# Patient Record
Sex: FEMALE | Race: WHITE | Employment: OTHER | URBAN - METROPOLITAN AREA
[De-identification: names, ages, dates, MRNs, and addresses within clinical notes are randomized per-mention and may not be internally consistent; named-entity substitution may affect disease eponyms.]

---

## 2017-03-07 PROBLEM — M81.0 OSTEOPOROSIS: Status: ACTIVE | Noted: 2017-03-07

## 2018-05-17 ENCOUNTER — HOSPITAL ENCOUNTER (OUTPATIENT)
Age: 68
Discharge: HOME | End: 2018-05-17
Payer: COMMERCIAL

## 2018-05-17 LAB
ALT SERPL-CCNC: 35 U/L (ref 0–32)
AST SERPL-CCNC: 30 U/L (ref 0–31)
CREAT SERPL-MCNC: 0.6 MG/DL (ref 0.5–1)
GFR AFRICAN AMERICAN: >60
GFR NON-AFRICAN AMERICAN: >60 ML/MIN/1.73
HCT VFR BLD CALC: 41 % (ref 34–48)
HEMOGLOBIN: 13.8 G/DL (ref 11.5–15.5)
MCH RBC QN AUTO: 33.8 PG (ref 26–35)
MCHC RBC AUTO-ENTMCNC: 33.7 % (ref 32–34.5)
MCV RBC AUTO: 100.5 FL (ref 80–99.9)
PDW BLD-RTO: 12.6 FL (ref 11.5–15)
PLATELET # BLD: 334 E9/L (ref 130–450)
PMV BLD AUTO: 9.6 FL (ref 7–12)
RBC # BLD: 4.08 E12/L (ref 3.5–5.5)
TOTAL CK: 305 U/L (ref 20–180)
WBC # BLD: 7.6 E9/L (ref 4.5–11.5)

## 2018-05-17 PROCEDURE — 85027 COMPLETE CBC AUTOMATED: CPT

## 2018-05-17 PROCEDURE — 36415 COLL VENOUS BLD VENIPUNCTURE: CPT

## 2018-05-17 PROCEDURE — 84460 ALANINE AMINO (ALT) (SGPT): CPT

## 2018-05-17 PROCEDURE — 82550 ASSAY OF CK (CPK): CPT

## 2018-05-17 PROCEDURE — 84450 TRANSFERASE (AST) (SGOT): CPT

## 2018-05-17 PROCEDURE — 82565 ASSAY OF CREATININE: CPT

## 2018-05-22 ENCOUNTER — HOSPITAL ENCOUNTER (OUTPATIENT)
Age: 68
Discharge: HOME | End: 2018-05-22
Payer: COMMERCIAL

## 2018-05-22 LAB
ALBUMIN SERPL-MCNC: 4.5 G/DL (ref 3.5–5.2)
ALP BLD-CCNC: 56 U/L (ref 35–104)
ALT SERPL-CCNC: 25 U/L (ref 0–32)
ANION GAP SERPL CALCULATED.3IONS-SCNC: 15 MMOL/L (ref 7–16)
AST SERPL-CCNC: 24 U/L (ref 0–31)
BILIRUB SERPL-MCNC: 0.6 MG/DL (ref 0–1.2)
BUN BLDV-MCNC: 16 MG/DL (ref 8–23)
CALCIUM SERPL-MCNC: 8.9 MG/DL (ref 8.6–10.2)
CHLORIDE BLD-SCNC: 99 MMOL/L (ref 98–107)
CHOLESTEROL, FASTING: 177 MG/DL (ref 0–199)
CO2: 29 MMOL/L (ref 22–29)
CREAT SERPL-MCNC: 0.5 MG/DL (ref 0.5–1)
GFR AFRICAN AMERICAN: >60
GFR NON-AFRICAN AMERICAN: >60 ML/MIN/1.73
GLUCOSE FASTING: 120 MG/DL (ref 74–109)
HBA1C MFR BLD: 8.1 % (ref 4.8–5.9)
HDLC SERPL-MCNC: 63 MG/DL
LDL CHOLESTEROL CALCULATED: 72 MG/DL (ref 0–99)
POTASSIUM SERPL-SCNC: 3.4 MMOL/L (ref 3.5–5)
SODIUM BLD-SCNC: 143 MMOL/L (ref 132–146)
TOTAL PROTEIN: 6.9 G/DL (ref 6.4–8.3)
TRIGLYCERIDE, FASTING: 209 MG/DL (ref 0–149)
VLDLC SERPL CALC-MCNC: 42 MG/DL

## 2018-05-22 PROCEDURE — 80053 COMPREHEN METABOLIC PANEL: CPT

## 2018-05-22 PROCEDURE — 83036 HEMOGLOBIN GLYCOSYLATED A1C: CPT

## 2018-05-22 PROCEDURE — 80061 LIPID PANEL: CPT

## 2018-05-22 PROCEDURE — 36415 COLL VENOUS BLD VENIPUNCTURE: CPT

## 2018-07-03 ENCOUNTER — NURSE TRIAGE (OUTPATIENT)
Dept: OTHER | Facility: CLINIC | Age: 68
End: 2018-07-03

## 2018-07-03 ENCOUNTER — APPOINTMENT (OUTPATIENT)
Dept: CT IMAGING | Age: 68
End: 2018-07-03
Payer: COMMERCIAL

## 2018-07-03 ENCOUNTER — HOSPITAL ENCOUNTER (EMERGENCY)
Age: 68
Discharge: HOME | End: 2018-07-03
Attending: EMERGENCY MEDICINE
Payer: COMMERCIAL

## 2018-07-03 VITALS
HEART RATE: 79 BPM | OXYGEN SATURATION: 97 % | HEIGHT: 68 IN | RESPIRATION RATE: 14 BRPM | BODY MASS INDEX: 24.86 KG/M2 | DIASTOLIC BLOOD PRESSURE: 69 MMHG | WEIGHT: 164 LBS | SYSTOLIC BLOOD PRESSURE: 168 MMHG | TEMPERATURE: 97.2 F

## 2018-07-03 DIAGNOSIS — R10.31 RIGHT LOWER QUADRANT ABDOMINAL PAIN: Primary | ICD-10-CM

## 2018-07-03 DIAGNOSIS — K44.9 HIATAL HERNIA: ICD-10-CM

## 2018-07-03 DIAGNOSIS — R19.7 DIARRHEA, UNSPECIFIED TYPE: ICD-10-CM

## 2018-07-03 LAB
ALBUMIN SERPL-MCNC: 4.7 G/DL (ref 3.5–5.2)
ALP BLD-CCNC: 71 U/L (ref 35–104)
ALT SERPL-CCNC: 28 U/L (ref 0–32)
ANION GAP SERPL CALCULATED.3IONS-SCNC: 16 MMOL/L (ref 7–16)
AST SERPL-CCNC: 28 U/L (ref 0–31)
BACTERIA: ABNORMAL /HPF
BASOPHILS ABSOLUTE: 0.04 E9/L (ref 0–0.2)
BASOPHILS RELATIVE PERCENT: 0.4 % (ref 0–2)
BILIRUB SERPL-MCNC: 0.5 MG/DL (ref 0–1.2)
BILIRUBIN URINE: NEGATIVE
BLOOD, URINE: ABNORMAL
BUN BLDV-MCNC: 15 MG/DL (ref 8–23)
CALCIUM SERPL-MCNC: 8.7 MG/DL (ref 8.6–10.2)
CHLORIDE BLD-SCNC: 100 MMOL/L (ref 98–107)
CLARITY: CLEAR
CO2: 26 MMOL/L (ref 22–29)
COLOR: YELLOW
CREAT SERPL-MCNC: 0.6 MG/DL (ref 0.5–1)
CRYSTALS, UA: ABNORMAL
EOSINOPHILS ABSOLUTE: 0.04 E9/L (ref 0.05–0.5)
EOSINOPHILS RELATIVE PERCENT: 0.4 % (ref 0–6)
GFR AFRICAN AMERICAN: >60
GFR NON-AFRICAN AMERICAN: >60 ML/MIN/1.73
GLUCOSE BLD-MCNC: 221 MG/DL (ref 74–109)
GLUCOSE URINE: NEGATIVE MG/DL
HCT VFR BLD CALC: 42.8 % (ref 34–48)
HEMOGLOBIN: 15.1 G/DL (ref 11.5–15.5)
IMMATURE GRANULOCYTES #: 0.07 E9/L
IMMATURE GRANULOCYTES %: 0.6 % (ref 0–5)
KETONES, URINE: ABNORMAL MG/DL
LACTIC ACID: 1.6 MMOL/L (ref 0.5–2.2)
LACTIC ACID: 2.7 MMOL/L (ref 0.5–2.2)
LACTIC ACID: 3.1 MMOL/L (ref 0.5–2.2)
LEUKOCYTE ESTERASE, URINE: ABNORMAL
LYMPHOCYTES ABSOLUTE: 1.05 E9/L (ref 1.5–4)
LYMPHOCYTES RELATIVE PERCENT: 9.2 % (ref 20–42)
MCH RBC QN AUTO: 34.8 PG (ref 26–35)
MCHC RBC AUTO-ENTMCNC: 35.3 % (ref 32–34.5)
MCV RBC AUTO: 98.6 FL (ref 80–99.9)
MONOCYTES ABSOLUTE: 0.73 E9/L (ref 0.1–0.95)
MONOCYTES RELATIVE PERCENT: 6.4 % (ref 2–12)
NEUTROPHILS ABSOLUTE: 9.43 E9/L (ref 1.8–7.3)
NEUTROPHILS RELATIVE PERCENT: 83 % (ref 43–80)
NITRITE, URINE: NEGATIVE
PDW BLD-RTO: 12.5 FL (ref 11.5–15)
PH UA: 5.5 (ref 5–9)
PLATELET # BLD: 333 E9/L (ref 130–450)
PMV BLD AUTO: 9.4 FL (ref 7–12)
POTASSIUM SERPL-SCNC: 3.7 MMOL/L (ref 3.5–5)
PROTEIN UA: NEGATIVE MG/DL
RBC # BLD: 4.34 E12/L (ref 3.5–5.5)
RBC UA: ABNORMAL /HPF (ref 0–2)
SODIUM BLD-SCNC: 142 MMOL/L (ref 132–146)
SPECIFIC GRAVITY UA: >=1.03 (ref 1–1.03)
TOTAL PROTEIN: 7.5 G/DL (ref 6.4–8.3)
UROBILINOGEN, URINE: 0.2 E.U./DL
WBC # BLD: 11.4 E9/L (ref 4.5–11.5)
WBC UA: ABNORMAL /HPF (ref 0–5)

## 2018-07-03 PROCEDURE — 74177 CT ABD & PELVIS W/CONTRAST: CPT

## 2018-07-03 PROCEDURE — 81001 URINALYSIS AUTO W/SCOPE: CPT

## 2018-07-03 PROCEDURE — 99284 EMERGENCY DEPT VISIT MOD MDM: CPT

## 2018-07-03 PROCEDURE — 36415 COLL VENOUS BLD VENIPUNCTURE: CPT

## 2018-07-03 PROCEDURE — 2580000003 HC RX 258: Performed by: STUDENT IN AN ORGANIZED HEALTH CARE EDUCATION/TRAINING PROGRAM

## 2018-07-03 PROCEDURE — 6360000004 HC RX CONTRAST MEDICATION: Performed by: RADIOLOGY

## 2018-07-03 PROCEDURE — 85025 COMPLETE CBC W/AUTO DIFF WBC: CPT

## 2018-07-03 PROCEDURE — 2580000003 HC RX 258: Performed by: RADIOLOGY

## 2018-07-03 PROCEDURE — 80053 COMPREHEN METABOLIC PANEL: CPT

## 2018-07-03 PROCEDURE — 83605 ASSAY OF LACTIC ACID: CPT

## 2018-07-03 RX ORDER — 0.9 % SODIUM CHLORIDE 0.9 %
1000 INTRAVENOUS SOLUTION INTRAVENOUS ONCE
Status: COMPLETED | OUTPATIENT
Start: 2018-07-03 | End: 2018-07-03

## 2018-07-03 RX ORDER — SODIUM CHLORIDE 0.9 % (FLUSH) 0.9 %
10 SYRINGE (ML) INJECTION ONCE
Status: COMPLETED | OUTPATIENT
Start: 2018-07-03 | End: 2018-07-03

## 2018-07-03 RX ORDER — DICYCLOMINE HYDROCHLORIDE 10 MG/1
20 CAPSULE ORAL 4 TIMES DAILY PRN
Qty: 20 CAPSULE | Refills: 0 | Status: SHIPPED | OUTPATIENT
Start: 2018-07-03 | End: 2021-01-18 | Stop reason: CLARIF

## 2018-07-03 RX ADMIN — SODIUM CHLORIDE 1000 ML: 9 INJECTION, SOLUTION INTRAVENOUS at 17:56

## 2018-07-03 RX ADMIN — Medication 10 ML: at 12:49

## 2018-07-03 RX ADMIN — IOPAMIDOL 110 ML: 755 INJECTION, SOLUTION INTRAVENOUS at 12:49

## 2018-07-03 RX ADMIN — SODIUM CHLORIDE 1000 ML: 9 INJECTION, SOLUTION INTRAVENOUS at 14:36

## 2018-07-03 ASSESSMENT — PAIN DESCRIPTION - PAIN TYPE: TYPE: ACUTE PAIN

## 2018-07-03 ASSESSMENT — ENCOUNTER SYMPTOMS
SORE THROAT: 0
SHORTNESS OF BREATH: 0
COLOR CHANGE: 0
COUGH: 0
BACK PAIN: 0
DIARRHEA: 1
ANAL BLEEDING: 0
NAUSEA: 0
ABDOMINAL PAIN: 1
BLOOD IN STOOL: 0
VOMITING: 0

## 2018-07-03 ASSESSMENT — PAIN DESCRIPTION - DESCRIPTORS: DESCRIPTORS: ACHING

## 2018-07-03 ASSESSMENT — PAIN DESCRIPTION - FREQUENCY: FREQUENCY: CONTINUOUS

## 2018-07-03 ASSESSMENT — PAIN DESCRIPTION - ORIENTATION: ORIENTATION: LOWER

## 2018-07-03 ASSESSMENT — PAIN DESCRIPTION - PROGRESSION: CLINICAL_PROGRESSION: GRADUALLY WORSENING

## 2018-07-03 ASSESSMENT — PAIN SCALES - GENERAL: PAINLEVEL_OUTOF10: 8

## 2018-07-03 ASSESSMENT — PAIN DESCRIPTION - LOCATION: LOCATION: ABDOMEN

## 2018-07-03 NOTE — ED NOTES
Spoke to Luis Fernando in [address]. Advised pt not to take Metphormin for 48 hours due to administration of IV contrast during CT scan. Will continue to monitor.      Rosanna Zhu RN  07/03/18 5773

## 2018-07-03 NOTE — ED PROVIDER NOTES
The patient is a 81-year-old female with a history of celiac disease who presents to the emergency department with complaint of 10 days of right lower quadrant abdominal pain, chronic diarrhea, and dark stools intermittently. The pain is rated 8/10 at its worst, fluctuates in severity, is better when lying flat. The patient currently reports pain is tolerable although it is more painful when elevated. She is concerned that she may have another hernia. Review of Systems   Constitutional: Negative for activity change, appetite change, chills, diaphoresis, fatigue and fever. HENT: Negative for congestion and sore throat. Eyes: Negative for visual disturbance. Respiratory: Negative for cough and shortness of breath. Cardiovascular: Negative for chest pain and palpitations. Gastrointestinal: Positive for abdominal pain (RLQ) and diarrhea (chronic). Negative for anal bleeding, blood in stool, nausea and vomiting. Dark stools intermitently   Endocrine: Negative for polyuria. Genitourinary: Negative for difficulty urinating, dysuria, flank pain and hematuria. Musculoskeletal: Negative for arthralgias, back pain, gait problem, joint swelling, myalgias, neck pain and neck stiffness. Skin: Negative for color change, pallor, rash and wound. Allergic/Immunologic: Negative for immunocompromised state. Neurological: Negative for dizziness, syncope, weakness, light-headedness, numbness and headaches. Hematological: Negative for adenopathy. Does not bruise/bleed easily. Psychiatric/Behavioral: Negative. Physical Exam   Constitutional: She is oriented to person, place, and time. She appears well-developed and well-nourished. No distress. HENT:   Head: Normocephalic and atraumatic. Right Ear: External ear normal.   Left Ear: External ear normal.   Nose: Nose normal.   Mouth/Throat: Oropharynx is clear and moist. No oropharyngeal exudate.    Eyes: Conjunctivae and EOM are normal. Urinalysis   Result Value Ref Range    Color, UA Yellow Straw/Yellow    Clarity, UA Clear Clear    Glucose, Ur Negative Negative mg/dL    Bilirubin Urine Negative Negative    Ketones, Urine TRACE (A) Negative mg/dL    Specific Gravity, UA >=1.030 1.005 - 1.030    Blood, Urine SMALL (A) Negative    pH, UA 5.5 5.0 - 9.0    Protein, UA Negative Negative mg/dL    Urobilinogen, Urine 0.2 <2.0 E.U./dL    Nitrite, Urine Negative Negative    Leukocyte Esterase, Urine TRACE (A) Negative   Microscopic Urinalysis   Result Value Ref Range    WBC, UA 0-1 0 - 5 /HPF    RBC, UA 1-3 0 - 2 /HPF    Bacteria, UA RARE (A) /HPF    Crystals Rare    Lactic Acid, Plasma   Result Value Ref Range    Lactic Acid 3.1 (H) 0.5 - 2.2 mmol/L   Lactic Acid, Plasma   Result Value Ref Range    Lactic Acid 1.6 0.5 - 2.2 mmol/L       Radiology:  CT ABDOMEN PELVIS W IV CONTRAST Additional Contrast? None   Final Result   There are multiple diverticula seen    Findings compatible with fatty infiltration of the liver   Renal masses, statistically likely cysts. Hiatal hernia                                  ------------------------- NURSING NOTES AND VITALS REVIEWED ---------------------------  Date / Time Roomed:  7/3/2018 11:19 AM  ED Bed Assignment:  08/35    The nursing notes within the ED encounter and vital signs as below have been reviewed. BP (!) 168/69   Pulse 79   Temp 97.2 °F (36.2 °C) (Temporal)   Resp 14   Ht 5' 8\" (1.727 m)   Wt 164 lb (74.4 kg)   SpO2 97%   Breastfeeding? No   BMI 24.94 kg/m²   Oxygen Saturation Interpretation: Normal      ------------------------------------------ PROGRESS NOTES ------------------------------------------  8:34 PM  I have spoken with the patient and discussed todays results, in addition to providing specific details for the plan of care and counseling regarding the diagnosis and prognosis. Their questions are answered at this time and they are agreeable with the plan.  I discussed at length with them reasons for immediate return here for re evaluation. They will followup with their GI specialist and primary care physician by calling their office within the next week.      --------------------------------- ADDITIONAL PROVIDER NOTES ---------------------------------  At this time the patient is without objective evidence of an acute process requiring hospitalization or inpatient management. They have remained hemodynamically stable throughout their entire ED visit and are stable for discharge with outpatient follow-up. The plan has been discussed in detail and they are aware of the specific conditions for emergent return, as well as the importance of follow-up. New Prescriptions    DICYCLOMINE (BENTYL) 10 MG CAPSULE    Take 2 capsules by mouth 4 times daily as needed (abdominal cramping)       Diagnosis:  1. Right lower quadrant abdominal pain    2. Diarrhea, unspecified type    3. Hiatal hernia        Disposition:  Patient's disposition: Discharge to home  Patient's condition is stable.          Robi Huang,   Resident  07/03/18 [id number]

## 2018-08-02 ENCOUNTER — HOSPITAL ENCOUNTER (OUTPATIENT)
Age: 68
Discharge: HOME | End: 2018-08-02
Payer: COMMERCIAL

## 2018-08-02 LAB
ALT SERPL-CCNC: 29 U/L (ref 0–32)
AST SERPL-CCNC: 33 U/L (ref 0–31)
CREAT SERPL-MCNC: 0.8 MG/DL (ref 0.5–1)
GFR AFRICAN AMERICAN: >60
GFR NON-AFRICAN AMERICAN: >60 ML/MIN/1.73
HCT VFR BLD CALC: 40.7 % (ref 34–48)
HEMOGLOBIN: 13.9 G/DL (ref 11.5–15.5)
MCH RBC QN AUTO: 34 PG (ref 26–35)
MCHC RBC AUTO-ENTMCNC: 34.2 % (ref 32–34.5)
MCV RBC AUTO: 99.5 FL (ref 80–99.9)
PDW BLD-RTO: 12.2 FL (ref 11.5–15)
PLATELET # BLD: 294 E9/L (ref 130–450)
PMV BLD AUTO: 9.5 FL (ref 7–12)
RBC # BLD: 4.09 E12/L (ref 3.5–5.5)
TOTAL CK: 531 U/L (ref 20–180)
WBC # BLD: 7.1 E9/L (ref 4.5–11.5)

## 2018-08-02 PROCEDURE — 84460 ALANINE AMINO (ALT) (SGPT): CPT

## 2018-08-02 PROCEDURE — 85027 COMPLETE CBC AUTOMATED: CPT

## 2018-08-02 PROCEDURE — 82565 ASSAY OF CREATININE: CPT

## 2018-08-02 PROCEDURE — 82550 ASSAY OF CK (CPK): CPT

## 2018-08-02 PROCEDURE — 84450 TRANSFERASE (AST) (SGOT): CPT

## 2018-08-02 PROCEDURE — 36415 COLL VENOUS BLD VENIPUNCTURE: CPT

## 2018-09-24 ENCOUNTER — HOSPITAL ENCOUNTER (OUTPATIENT)
Age: 68
Discharge: HOME | End: 2018-09-24
Payer: COMMERCIAL

## 2018-09-24 LAB
ALBUMIN SERPL-MCNC: 4.4 G/DL (ref 3.5–5.2)
ALP BLD-CCNC: 53 U/L (ref 35–104)
ALT SERPL-CCNC: 23 U/L (ref 0–32)
ANION GAP SERPL CALCULATED.3IONS-SCNC: 15 MMOL/L (ref 7–16)
AST SERPL-CCNC: 25 U/L (ref 0–31)
BASOPHILS ABSOLUTE: 0.03 E9/L (ref 0–0.2)
BASOPHILS RELATIVE PERCENT: 0.5 % (ref 0–2)
BILIRUB SERPL-MCNC: 0.6 MG/DL (ref 0–1.2)
BILIRUBIN DIRECT: <0.2 MG/DL (ref 0–0.3)
BILIRUBIN, INDIRECT: NORMAL MG/DL (ref 0–1)
BUN BLDV-MCNC: 16 MG/DL (ref 8–23)
CALCIUM SERPL-MCNC: 8.9 MG/DL (ref 8.6–10.2)
CHLORIDE BLD-SCNC: 101 MMOL/L (ref 98–107)
CHOLESTEROL, TOTAL: 195 MG/DL (ref 0–199)
CO2: 26 MMOL/L (ref 22–29)
CREAT SERPL-MCNC: 0.7 MG/DL (ref 0.5–1)
EOSINOPHILS ABSOLUTE: 0.08 E9/L (ref 0.05–0.5)
EOSINOPHILS RELATIVE PERCENT: 1.3 % (ref 0–6)
GFR AFRICAN AMERICAN: >60
GFR NON-AFRICAN AMERICAN: >60 ML/MIN/1.73
GLUCOSE BLD-MCNC: 143 MG/DL (ref 74–109)
HBA1C MFR BLD: 7.7 % (ref 4–5.6)
HCT VFR BLD CALC: 45.2 % (ref 34–48)
HDLC SERPL-MCNC: 72 MG/DL
HEMOGLOBIN: 15.1 G/DL (ref 11.5–15.5)
IMMATURE GRANULOCYTES #: 0.03 E9/L
IMMATURE GRANULOCYTES %: 0.5 % (ref 0–5)
LACTATE DEHYDROGENASE: 299 U/L (ref 135–214)
LDL CHOLESTEROL CALCULATED: 74 MG/DL (ref 0–99)
LYMPHOCYTES ABSOLUTE: 1.64 E9/L (ref 1.5–4)
LYMPHOCYTES RELATIVE PERCENT: 27.4 % (ref 20–42)
MCH RBC QN AUTO: 33.8 PG (ref 26–35)
MCHC RBC AUTO-ENTMCNC: 33.4 % (ref 32–34.5)
MCV RBC AUTO: 101.1 FL (ref 80–99.9)
MONOCYTES ABSOLUTE: 0.72 E9/L (ref 0.1–0.95)
MONOCYTES RELATIVE PERCENT: 12 % (ref 2–12)
NEUTROPHILS ABSOLUTE: 3.49 E9/L (ref 1.8–7.3)
NEUTROPHILS RELATIVE PERCENT: 58.3 % (ref 43–80)
PDW BLD-RTO: 12.2 FL (ref 11.5–15)
PHOSPHORUS: 3.7 MG/DL (ref 2.5–4.5)
PLATELET # BLD: 330 E9/L (ref 130–450)
PMV BLD AUTO: 10 FL (ref 7–12)
POTASSIUM SERPL-SCNC: 3.8 MMOL/L (ref 3.5–5)
RBC # BLD: 4.47 E12/L (ref 3.5–5.5)
SODIUM BLD-SCNC: 142 MMOL/L (ref 132–146)
T4 TOTAL: 10.1 MCG/DL (ref 4.5–11.7)
TOTAL CK: 217 U/L (ref 20–180)
TOTAL PROTEIN: 7.2 G/DL (ref 6.4–8.3)
TRIGL SERPL-MCNC: 243 MG/DL (ref 0–149)
TSH SERPL DL<=0.05 MIU/L-ACNC: 0.54 UIU/ML (ref 0.27–4.2)
URIC ACID, SERUM: 5.2 MG/DL (ref 2.4–5.7)
VITAMIN B-12: 762 PG/ML (ref 211–946)
VITAMIN D 25-HYDROXY: 51 NG/ML (ref 30–100)
VLDLC SERPL CALC-MCNC: 49 MG/DL
WBC # BLD: 6 E9/L (ref 4.5–11.5)

## 2018-09-24 PROCEDURE — 80053 COMPREHEN METABOLIC PANEL: CPT

## 2018-09-24 PROCEDURE — 84443 ASSAY THYROID STIM HORMONE: CPT

## 2018-09-24 PROCEDURE — 84100 ASSAY OF PHOSPHORUS: CPT

## 2018-09-24 PROCEDURE — 83036 HEMOGLOBIN GLYCOSYLATED A1C: CPT

## 2018-09-24 PROCEDURE — 83615 LACTATE (LD) (LDH) ENZYME: CPT

## 2018-09-24 PROCEDURE — 85025 COMPLETE CBC W/AUTO DIFF WBC: CPT

## 2018-09-24 PROCEDURE — 84550 ASSAY OF BLOOD/URIC ACID: CPT

## 2018-09-24 PROCEDURE — 80061 LIPID PANEL: CPT

## 2018-09-24 PROCEDURE — 82550 ASSAY OF CK (CPK): CPT

## 2018-09-24 PROCEDURE — 36415 COLL VENOUS BLD VENIPUNCTURE: CPT

## 2018-09-24 PROCEDURE — 84436 ASSAY OF TOTAL THYROXINE: CPT

## 2018-09-24 PROCEDURE — 82248 BILIRUBIN DIRECT: CPT

## 2018-09-24 PROCEDURE — 82306 VITAMIN D 25 HYDROXY: CPT

## 2018-09-24 PROCEDURE — 82607 VITAMIN B-12: CPT

## 2018-12-12 ENCOUNTER — HOSPITAL ENCOUNTER (OUTPATIENT)
Dept: MAMMOGRAPHY | Age: 68
Discharge: HOME | End: 2018-12-14
Payer: COMMERCIAL

## 2018-12-12 DIAGNOSIS — Z13.820 SCREENING FOR OSTEOPOROSIS: ICD-10-CM

## 2018-12-12 DIAGNOSIS — Z12.31 ENCOUNTER FOR SCREENING MAMMOGRAM FOR BREAST CANCER: ICD-10-CM

## 2018-12-12 PROCEDURE — 77067 SCR MAMMO BI INCL CAD: CPT

## 2018-12-12 PROCEDURE — 77080 DXA BONE DENSITY AXIAL: CPT

## 2019-02-12 ENCOUNTER — HOSPITAL ENCOUNTER (OUTPATIENT)
Age: 69
Discharge: HOME | End: 2019-02-12
Payer: COMMERCIAL

## 2019-02-12 LAB
ALT SERPL-CCNC: 22 U/L (ref 0–32)
AST SERPL-CCNC: 19 U/L (ref 0–31)
CREAT SERPL-MCNC: 0.6 MG/DL (ref 0.5–1)
GFR AFRICAN AMERICAN: >60
GFR NON-AFRICAN AMERICAN: >60 ML/MIN/1.73
HCT VFR BLD CALC: 41.9 % (ref 34–48)
HEMOGLOBIN: 14.3 G/DL (ref 11.5–15.5)
MCH RBC QN AUTO: 34 PG (ref 26–35)
MCHC RBC AUTO-ENTMCNC: 34.1 % (ref 32–34.5)
MCV RBC AUTO: 99.5 FL (ref 80–99.9)
PDW BLD-RTO: 12.2 FL (ref 11.5–15)
PLATELET # BLD: 329 E9/L (ref 130–450)
PMV BLD AUTO: 9.6 FL (ref 7–12)
RBC # BLD: 4.21 E12/L (ref 3.5–5.5)
TOTAL CK: 240 U/L (ref 20–180)
WBC # BLD: 6 E9/L (ref 4.5–11.5)

## 2019-02-12 PROCEDURE — 82565 ASSAY OF CREATININE: CPT

## 2019-02-12 PROCEDURE — 36415 COLL VENOUS BLD VENIPUNCTURE: CPT

## 2019-02-12 PROCEDURE — 85027 COMPLETE CBC AUTOMATED: CPT

## 2019-02-12 PROCEDURE — 84460 ALANINE AMINO (ALT) (SGPT): CPT

## 2019-02-12 PROCEDURE — 82550 ASSAY OF CK (CPK): CPT

## 2019-02-12 PROCEDURE — 84450 TRANSFERASE (AST) (SGOT): CPT

## 2019-03-11 ENCOUNTER — HOSPITAL ENCOUNTER (OUTPATIENT)
Age: 69
Discharge: HOME | End: 2019-03-11
Payer: COMMERCIAL

## 2019-03-11 LAB
CHOLESTEROL, FASTING: 190 MG/DL (ref 0–199)
GLUCOSE FASTING: 220 MG/DL (ref 74–99)
HDLC SERPL-MCNC: 62 MG/DL
LDL CHOLESTEROL CALCULATED: 82 MG/DL (ref 0–99)
TRIGLYCERIDE, FASTING: 231 MG/DL (ref 0–149)
VLDLC SERPL CALC-MCNC: 46 MG/DL

## 2019-03-11 PROCEDURE — 36415 COLL VENOUS BLD VENIPUNCTURE: CPT

## 2019-03-11 PROCEDURE — 82947 ASSAY GLUCOSE BLOOD QUANT: CPT

## 2019-03-11 PROCEDURE — 80061 LIPID PANEL: CPT

## 2019-04-23 ENCOUNTER — HOSPITAL ENCOUNTER (OUTPATIENT)
Age: 69
Discharge: HOME | End: 2019-04-23
Payer: COMMERCIAL

## 2019-04-23 LAB
ALT SERPL-CCNC: 28 U/L (ref 0–32)
AST SERPL-CCNC: 30 U/L (ref 0–31)
CREAT SERPL-MCNC: 0.6 MG/DL (ref 0.5–1)
GFR AFRICAN AMERICAN: >60
GFR NON-AFRICAN AMERICAN: >60 ML/MIN/1.73
HCT VFR BLD CALC: 43.1 % (ref 34–48)
HEMOGLOBIN: 14.7 G/DL (ref 11.5–15.5)
MCH RBC QN AUTO: 34.5 PG (ref 26–35)
MCHC RBC AUTO-ENTMCNC: 34.1 % (ref 32–34.5)
MCV RBC AUTO: 101.2 FL (ref 80–99.9)
PDW BLD-RTO: 12.3 FL (ref 11.5–15)
PLATELET # BLD: 350 E9/L (ref 130–450)
PMV BLD AUTO: 9.7 FL (ref 7–12)
RBC # BLD: 4.26 E12/L (ref 3.5–5.5)
TOTAL CK: 367 U/L (ref 20–180)
WBC # BLD: 6.7 E9/L (ref 4.5–11.5)

## 2019-04-23 PROCEDURE — 82565 ASSAY OF CREATININE: CPT

## 2019-04-23 PROCEDURE — 36415 COLL VENOUS BLD VENIPUNCTURE: CPT

## 2019-04-23 PROCEDURE — 84460 ALANINE AMINO (ALT) (SGPT): CPT

## 2019-04-23 PROCEDURE — 84450 TRANSFERASE (AST) (SGOT): CPT

## 2019-04-23 PROCEDURE — 85027 COMPLETE CBC AUTOMATED: CPT

## 2019-04-23 PROCEDURE — 82550 ASSAY OF CK (CPK): CPT

## 2019-05-24 ENCOUNTER — OFFICE VISIT (OUTPATIENT)
Dept: FAMILY MEDICINE CLINIC | Age: 69
End: 2019-05-24
Payer: COMMERCIAL

## 2019-05-24 VITALS
WEIGHT: 166.2 LBS | SYSTOLIC BLOOD PRESSURE: 128 MMHG | DIASTOLIC BLOOD PRESSURE: 80 MMHG | HEART RATE: 76 BPM | TEMPERATURE: 96.8 F | BODY MASS INDEX: 25.19 KG/M2 | HEIGHT: 68 IN

## 2019-05-24 DIAGNOSIS — M25.552 LEFT HIP PAIN: Primary | ICD-10-CM

## 2019-05-24 PROCEDURE — 99213 OFFICE O/P EST LOW 20 MIN: CPT | Performed by: PHYSICIAN ASSISTANT

## 2019-05-24 RX ORDER — ROSUVASTATIN CALCIUM 10 MG/1
10 TABLET, COATED ORAL NIGHTLY
COMMUNITY

## 2019-05-24 NOTE — PROGRESS NOTES
19  Bowen Lancaster Held : 1950 Sex: female  Age 80 y.o. Subjective:  Chief Complaint   Patient presents with    Leg Pain     left leg injury-yesterday         HPI:   [address] , 80 y.o. female presents to [facility] care for evaluation of left hip pain. The patient states that she has a history of polymyositis and has some issues with weakness. The patient had stepped and had an inversion injury of her left foot and ankle and caused her to turn and \"wrench\" her left hip. The patient is having pain to the left posterior hip. The patient went to ensure that she does not have a fracture. No direct trauma to the left hip. Patient is undergoing any abdominal pain. There is no head injury. ROS:   Unless otherwise stated in this report the patient's positive and negative responses for review of systems for constitutional, eyes, ENT, cardiovascular, respiratory, gastrointestinal, neurological, , musculoskeletal, and integument systems and related systems to the presenting problem are either stated in the history of present illness or were not pertinent or were negative for the symptoms and/or complaints related to the presenting medical problem. Positives and pertinent negatives as per HPI. All others reviewed and are negative.       PMH:     Past Medical History:   Diagnosis Date    Celiac disease     Polymyositis ([facility] [address]        Past Surgical History:   Procedure Laterality Date    HERNIA REPAIR      HYSTERECTOMY      LUMBAR LAMINECTOMY  ,     THYROIDECTOMY, COMPLETION       Medications:     Current Outpatient Medications:     rosuvastatin (CRESTOR) 20 MG tablet, Take 20 mg by mouth daily, Disp: , Rfl:     metFORMIN (GLUCOPHAGE) 500 MG tablet, Take 500 mg by mouth 2 times daily (with meals), Disp: , Rfl:     predniSONE (DELTASONE) 10 MG tablet, Take 7.5 mg by mouth daily, Disp: , Rfl:     azaTHIOprine (IMURAN) 50 MG tablet, Take 50 mg by mouth 2 times daily, Disp: , Rfl:    acebutolol (SECTRAL) 200 MG capsule, Take 200 mg by mouth daily, Disp: , Rfl:     levothyroxine (SYNTHROID) 150 MCG tablet, Take 150 mcg by mouth Daily, Disp: , Rfl:     famotidine (PEPCID) 20 MG tablet, Take 20 mg by mouth 2 times daily, Disp: , Rfl:     pantoprazole (PROTONIX) 40 MG tablet, Take 40 mg by mouth daily, Disp: , Rfl:     vitamin D (CHOLECALCIFEROL) 1000 UNITS TABS tablet, Take 4,000 Units by mouth daily, Disp: , Rfl:     dicyclomine (BENTYL) 10 MG capsule, Take 2 capsules by mouth 4 times daily as needed (abdominal cramping), Disp: 20 capsule, Rfl: none    ascorbic acid (VITAMIN C) 500 MG tablet, Take 500 mg by mouth daily, Disp: , Rfl:     calcium carbonate (TUMS) 500 MG chewable tablet, Take 2 tablets by mouth 2 times daily, Disp: , Rfl:     Allergies: Allergies   Allergen Reactions    Sulfa Antibiotics Hives    Tetracyclines & Related Hives       Social History:     Social History     Tobacco Use    Smoking status: Former Smoker     Last attempt to quit: 3/15/1977     Years since quittin.2    Smokeless tobacco: Never Used   Substance Use Topics    Alcohol use: No    Drug use: No       Physical Exam:     Vitals:    19 0858   BP: 128/80   Site: Right Upper Arm   Position: Sitting   Pulse: 76   Temp: 96.8 °F (36 °C)   Weight: 166 lb 3.2 oz (75.4 kg)   Height: 5' 8\" (1.727 m)       Exam:  Physical Exam  Vital signs reviewed and nurse's notes. The patient is not hypoxic. General: Alert, no acute distress, patient resting comfortably   Skin: warm, intact, no pallor noted   Head: Normocephalic, atraumatic   Eye: Normal conjunctiva   Respiratory: No acute distress    Musculoskeletal: The patient has no obvious deformity noted to the left lower extremity. The patient had reproducible pain to the posterior aspect of the left hip and the posterolateral hip. Patient was able to ambulate. There is no shortening or rotation noted.  The patient had no significant pain to the mid femur or the left knee. The patient had no pain to the left ankle. Pulses were intact at dorsalis pedis and posterior tibialis. No significant pain to the cervical, thoracic, or lumbar spine. Neurological: alert and orient x4, normal sensory and motor observed. Psychiatric: Cooperative       Testing:     Xr Hip 2-3 Vw W Pelvis Left    Result Date: 5/24/2019  Reading location:  [facility] HISTORY: Left hip pain. TECHNIQUE: An AP view of the pelvis, AP view of the left hip and frogleg lateral view of the left hip were obtained. FINDINGS: There is no pelvic fracture. No right or left hip fracture or dislocation is noted. There are mild degenerative changes of the right and left hips. The sacrum is intact. The sacroiliac joints are symmetrical bilaterally. No osseous lesion is identified. 1. Mild symmetrical degenerative changes of the right and left hips. Medical Decision Making:     The patient on arrival does not appear to be in any apparent distress or discomfort. The patient is able to ambulate but is walking with a limp. The patient will have x-rays obtained of the left hip and pelvis. There is no evidence of acute fracture or dislocation however formal radiology report is pending. The patient will be contacted with the results. She may use Motrin, Tylenol. The patient understands plan and has no other questions or concerns at this time. She is neurovascularly intact. Clinical Impression:   Aba Champion was seen today for leg pain. Diagnoses and all orders for this visit:    Left hip pain  -     XR HIP 2-3 VW W PELVIS LEFT; Future        The patient is to call for any concerns or return if any of the signs or symptoms worsen. The patient is to follow-up with PCP in the next 2-3 days for repeat evaluation repeat assessment or go directly to the emergency department.      SIGNATURE: Chio Bailey III, PA-C

## 2019-05-30 ENCOUNTER — TELEPHONE (OUTPATIENT)
Dept: PRIMARY CARE CLINIC | Age: 69
End: 2019-05-30

## 2019-05-30 NOTE — TELEPHONE ENCOUNTER
----- Message from DOMINICK Lebron III sent at 5/24/2019  9:44 AM EDT -----  X-ray of the pelvis and left hip showed a mild symmetrical degenerative changes of the right and left hips. There is no osseous lesion identified. No evidence of acute fracture or dislocation.

## 2019-06-12 ENCOUNTER — TELEPHONE (OUTPATIENT)
Dept: PRIMARY CARE CLINIC | Age: 69
End: 2019-06-12

## 2019-06-12 NOTE — TELEPHONE ENCOUNTER
----- Message from Hale Schlatter III, PA sent at 5/24/2019  9:44 AM EDT -----  X-ray of the pelvis and left hip showed a mild symmetrical degenerative changes of the right and left hips. There is no osseous lesion identified. No evidence of acute fracture or dislocation.

## 2019-07-22 ENCOUNTER — HOSPITAL ENCOUNTER (OUTPATIENT)
Age: 69
Discharge: HOME | End: 2019-07-22
Payer: COMMERCIAL

## 2019-07-22 LAB
ALT SERPL-CCNC: 31 U/L (ref 0–32)
AST SERPL-CCNC: 30 U/L (ref 0–31)
CREAT SERPL-MCNC: 0.5 MG/DL (ref 0.5–1)
GFR AFRICAN AMERICAN: >60
GFR NON-AFRICAN AMERICAN: >60 ML/MIN/1.73
HBA1C MFR BLD: 8.7 % (ref 4–5.6)
HCT VFR BLD CALC: 45 % (ref 34–48)
HEMOGLOBIN: 15 G/DL (ref 11.5–15.5)
MCH RBC QN AUTO: 33.5 PG (ref 26–35)
MCHC RBC AUTO-ENTMCNC: 33.3 % (ref 32–34.5)
MCV RBC AUTO: 100.4 FL (ref 80–99.9)
PDW BLD-RTO: 12 FL (ref 11.5–15)
PLATELET # BLD: 347 E9/L (ref 130–450)
PMV BLD AUTO: 9.6 FL (ref 7–12)
RBC # BLD: 4.48 E12/L (ref 3.5–5.5)
TOTAL CK: 424 U/L (ref 20–180)
WBC # BLD: 8.1 E9/L (ref 4.5–11.5)

## 2019-07-22 PROCEDURE — 82565 ASSAY OF CREATININE: CPT

## 2019-07-22 PROCEDURE — 83036 HEMOGLOBIN GLYCOSYLATED A1C: CPT

## 2019-07-22 PROCEDURE — 82550 ASSAY OF CK (CPK): CPT

## 2019-07-22 PROCEDURE — 36415 COLL VENOUS BLD VENIPUNCTURE: CPT

## 2019-07-22 PROCEDURE — 85027 COMPLETE CBC AUTOMATED: CPT

## 2019-07-22 PROCEDURE — 84460 ALANINE AMINO (ALT) (SGPT): CPT

## 2019-07-22 PROCEDURE — 84450 TRANSFERASE (AST) (SGOT): CPT

## 2019-08-22 ENCOUNTER — HOSPITAL ENCOUNTER (OUTPATIENT)
Dept: ULTRASOUND IMAGING | Age: 69
Discharge: HOME | End: 2019-08-24
Payer: COMMERCIAL

## 2019-08-22 DIAGNOSIS — R59.0 ENLARGED LYMPH NODE IN NECK: ICD-10-CM

## 2019-08-22 PROCEDURE — 76536 US EXAM OF HEAD AND NECK: CPT

## 2019-09-05 ENCOUNTER — HOSPITAL ENCOUNTER (OUTPATIENT)
Dept: CT IMAGING | Age: 69
Discharge: HOME | End: 2019-09-07
Payer: COMMERCIAL

## 2019-09-05 DIAGNOSIS — R59.9 ENLARGED LYMPH NODES: ICD-10-CM

## 2019-09-05 PROCEDURE — 6360000004 HC RX CONTRAST MEDICATION: Performed by: RADIOLOGY

## 2019-09-05 PROCEDURE — 70491 CT SOFT TISSUE NECK W/DYE: CPT

## 2019-09-05 RX ADMIN — IOPAMIDOL 80 ML: 755 INJECTION, SOLUTION INTRAVENOUS at 09:34

## 2019-09-13 ENCOUNTER — HOSPITAL ENCOUNTER (OUTPATIENT)
Age: 69
Discharge: HOME | End: 2019-09-13
Payer: COMMERCIAL

## 2019-09-13 LAB
ALBUMIN SERPL-MCNC: 4.8 G/DL (ref 3.5–5.2)
ALP BLD-CCNC: 63 U/L (ref 35–104)
ALT SERPL-CCNC: 26 U/L (ref 0–32)
ANION GAP SERPL CALCULATED.3IONS-SCNC: 16 MMOL/L (ref 7–16)
AST SERPL-CCNC: 26 U/L (ref 0–31)
BASOPHILS ABSOLUTE: 0.03 E9/L (ref 0–0.2)
BASOPHILS RELATIVE PERCENT: 0.5 % (ref 0–2)
BILIRUB SERPL-MCNC: 0.8 MG/DL (ref 0–1.2)
BILIRUBIN DIRECT: <0.2 MG/DL (ref 0–0.3)
BILIRUBIN, INDIRECT: NORMAL MG/DL (ref 0–1)
BUN BLDV-MCNC: 18 MG/DL (ref 8–23)
CALCIUM SERPL-MCNC: 9.4 MG/DL (ref 8.6–10.2)
CHLORIDE BLD-SCNC: 97 MMOL/L (ref 98–107)
CHOLESTEROL, FASTING: 193 MG/DL (ref 0–199)
CO2: 26 MMOL/L (ref 22–29)
CREAT SERPL-MCNC: 0.6 MG/DL (ref 0.5–1)
EOSINOPHILS ABSOLUTE: 0.09 E9/L (ref 0.05–0.5)
EOSINOPHILS RELATIVE PERCENT: 1.6 % (ref 0–6)
GFR AFRICAN AMERICAN: >60
GFR NON-AFRICAN AMERICAN: >60 ML/MIN/1.73
GLUCOSE BLD-MCNC: 196 MG/DL (ref 74–99)
HBA1C MFR BLD: 9 % (ref 4–5.6)
HCT VFR BLD CALC: 45.9 % (ref 34–48)
HDLC SERPL-MCNC: 59 MG/DL
HEMOGLOBIN: 15.6 G/DL (ref 11.5–15.5)
IMMATURE GRANULOCYTES #: 0.03 E9/L
IMMATURE GRANULOCYTES %: 0.5 % (ref 0–5)
LACTATE DEHYDROGENASE: 282 U/L (ref 135–214)
LDL CHOLESTEROL CALCULATED: 80 MG/DL (ref 0–99)
LYMPHOCYTES ABSOLUTE: 1.36 E9/L (ref 1.5–4)
LYMPHOCYTES RELATIVE PERCENT: 24.2 % (ref 20–42)
MCH RBC QN AUTO: 33.5 PG (ref 26–35)
MCHC RBC AUTO-ENTMCNC: 34 % (ref 32–34.5)
MCV RBC AUTO: 98.5 FL (ref 80–99.9)
MONOCYTES ABSOLUTE: 0.68 E9/L (ref 0.1–0.95)
MONOCYTES RELATIVE PERCENT: 12.1 % (ref 2–12)
NEUTROPHILS ABSOLUTE: 3.43 E9/L (ref 1.8–7.3)
NEUTROPHILS RELATIVE PERCENT: 61.1 % (ref 43–80)
PDW BLD-RTO: 12.3 FL (ref 11.5–15)
PHOSPHORUS: 4.1 MG/DL (ref 2.5–4.5)
PLATELET # BLD: 307 E9/L (ref 130–450)
PMV BLD AUTO: 10.1 FL (ref 7–12)
POTASSIUM SERPL-SCNC: 4 MMOL/L (ref 3.5–5)
RBC # BLD: 4.66 E12/L (ref 3.5–5.5)
SEDIMENTATION RATE, ERYTHROCYTE: 2 MM/HR (ref 0–20)
SODIUM BLD-SCNC: 139 MMOL/L (ref 132–146)
T4 TOTAL: 9 MCG/DL (ref 4.5–11.7)
TOTAL CK: 286 U/L (ref 20–180)
TOTAL PROTEIN: 7.5 G/DL (ref 6.4–8.3)
TRIGLYCERIDE, FASTING: 268 MG/DL (ref 0–149)
TSH SERPL DL<=0.05 MIU/L-ACNC: 1.18 UIU/ML (ref 0.27–4.2)
URIC ACID, SERUM: 5 MG/DL (ref 2.4–5.7)
VITAMIN B-12: 1069 PG/ML (ref 211–946)
VITAMIN D 25-HYDROXY: 48 NG/ML (ref 30–100)
VLDLC SERPL CALC-MCNC: 54 MG/DL
WBC # BLD: 5.6 E9/L (ref 4.5–11.5)

## 2019-09-13 PROCEDURE — 84436 ASSAY OF TOTAL THYROXINE: CPT

## 2019-09-13 PROCEDURE — 80061 LIPID PANEL: CPT

## 2019-09-13 PROCEDURE — 85651 RBC SED RATE NONAUTOMATED: CPT

## 2019-09-13 PROCEDURE — 80053 COMPREHEN METABOLIC PANEL: CPT

## 2019-09-13 PROCEDURE — 83615 LACTATE (LD) (LDH) ENZYME: CPT

## 2019-09-13 PROCEDURE — 85025 COMPLETE CBC W/AUTO DIFF WBC: CPT

## 2019-09-13 PROCEDURE — 84550 ASSAY OF BLOOD/URIC ACID: CPT

## 2019-09-13 PROCEDURE — 84100 ASSAY OF PHOSPHORUS: CPT

## 2019-09-13 PROCEDURE — 83036 HEMOGLOBIN GLYCOSYLATED A1C: CPT

## 2019-09-13 PROCEDURE — 82550 ASSAY OF CK (CPK): CPT

## 2019-09-13 PROCEDURE — 82306 VITAMIN D 25 HYDROXY: CPT

## 2019-09-13 PROCEDURE — 82248 BILIRUBIN DIRECT: CPT

## 2019-09-13 PROCEDURE — 82607 VITAMIN B-12: CPT

## 2019-09-13 PROCEDURE — 84443 ASSAY THYROID STIM HORMONE: CPT

## 2019-11-14 ENCOUNTER — HOSPITAL ENCOUNTER (OUTPATIENT)
Age: 69
Discharge: HOME | End: 2019-11-14
Payer: COMMERCIAL

## 2019-11-14 LAB
ALT SERPL-CCNC: 24 U/L (ref 0–32)
AST SERPL-CCNC: 25 U/L (ref 0–31)
CREAT SERPL-MCNC: 0.6 MG/DL (ref 0.5–1)
GFR AFRICAN AMERICAN: >60
GFR NON-AFRICAN AMERICAN: >60 ML/MIN/1.73
HCT VFR BLD CALC: 44.5 % (ref 34–48)
HEMOGLOBIN: 14.8 G/DL (ref 11.5–15.5)
MCH RBC QN AUTO: 33.5 PG (ref 26–35)
MCHC RBC AUTO-ENTMCNC: 33.3 % (ref 32–34.5)
MCV RBC AUTO: 100.7 FL (ref 80–99.9)
PDW BLD-RTO: 12.2 FL (ref 11.5–15)
PLATELET # BLD: 358 E9/L (ref 130–450)
PMV BLD AUTO: 10.4 FL (ref 7–12)
RBC # BLD: 4.42 E12/L (ref 3.5–5.5)
TOTAL CK: 282 U/L (ref 20–180)
WBC # BLD: 6.9 E9/L (ref 4.5–11.5)

## 2019-11-14 PROCEDURE — 82550 ASSAY OF CK (CPK): CPT

## 2019-11-14 PROCEDURE — 84460 ALANINE AMINO (ALT) (SGPT): CPT

## 2019-11-14 PROCEDURE — 84450 TRANSFERASE (AST) (SGOT): CPT

## 2019-11-14 PROCEDURE — 36415 COLL VENOUS BLD VENIPUNCTURE: CPT

## 2019-11-14 PROCEDURE — 85027 COMPLETE CBC AUTOMATED: CPT

## 2019-11-14 PROCEDURE — 82565 ASSAY OF CREATININE: CPT

## 2019-12-13 ENCOUNTER — HOSPITAL ENCOUNTER (OUTPATIENT)
Dept: MAMMOGRAPHY | Age: 69
Discharge: HOME | End: 2019-12-15
Payer: COMMERCIAL

## 2019-12-13 DIAGNOSIS — Z12.39 SCREENING FOR BREAST CANCER: ICD-10-CM

## 2019-12-13 PROCEDURE — 77067 SCR MAMMO BI INCL CAD: CPT

## 2020-02-26 ENCOUNTER — HOSPITAL ENCOUNTER (OUTPATIENT)
Age: 70
Discharge: HOME | End: 2020-02-26
Payer: COMMERCIAL

## 2020-02-26 LAB
ALT SERPL-CCNC: 27 U/L (ref 0–32)
AST SERPL-CCNC: 29 U/L (ref 0–31)
CREAT SERPL-MCNC: 0.6 MG/DL (ref 0.5–1)
GFR AFRICAN AMERICAN: >60
GFR NON-AFRICAN AMERICAN: >60 ML/MIN/1.73
HCT VFR BLD CALC: 43 % (ref 34–48)
HEMOGLOBIN: 14.3 G/DL (ref 11.5–15.5)
MCH RBC QN AUTO: 34 PG (ref 26–35)
MCHC RBC AUTO-ENTMCNC: 33.3 % (ref 32–34.5)
MCV RBC AUTO: 102.4 FL (ref 80–99.9)
PDW BLD-RTO: 12.5 FL (ref 11.5–15)
PLATELET # BLD: 344 E9/L (ref 130–450)
PMV BLD AUTO: 10 FL (ref 7–12)
RBC # BLD: 4.2 E12/L (ref 3.5–5.5)
TOTAL CK: 241 U/L (ref 20–180)
WBC # BLD: 7.1 E9/L (ref 4.5–11.5)

## 2020-02-26 PROCEDURE — 85027 COMPLETE CBC AUTOMATED: CPT

## 2020-02-26 PROCEDURE — 36415 COLL VENOUS BLD VENIPUNCTURE: CPT

## 2020-02-26 PROCEDURE — 82565 ASSAY OF CREATININE: CPT

## 2020-02-26 PROCEDURE — 82550 ASSAY OF CK (CPK): CPT

## 2020-02-26 PROCEDURE — 84460 ALANINE AMINO (ALT) (SGPT): CPT

## 2020-02-26 PROCEDURE — 84450 TRANSFERASE (AST) (SGOT): CPT

## 2020-06-17 ENCOUNTER — HOSPITAL ENCOUNTER (OUTPATIENT)
Age: 70
Discharge: HOME | End: 2020-06-17
Payer: COMMERCIAL

## 2020-06-17 LAB
ALBUMIN SERPL-MCNC: 4.8 G/DL (ref 3.5–5.2)
ALP BLD-CCNC: 62 U/L (ref 35–104)
ALT SERPL-CCNC: 26 U/L (ref 0–32)
AMYLASE: 30 U/L (ref 20–100)
ANION GAP SERPL CALCULATED.3IONS-SCNC: 12 MMOL/L (ref 7–16)
AST SERPL-CCNC: 31 U/L (ref 0–31)
BILIRUB SERPL-MCNC: 0.8 MG/DL (ref 0–1.2)
BUN BLDV-MCNC: 13 MG/DL (ref 8–23)
CALCIUM SERPL-MCNC: 8.8 MG/DL (ref 8.6–10.2)
CHLORIDE BLD-SCNC: 96 MMOL/L (ref 98–107)
CO2: 28 MMOL/L (ref 22–29)
CREAT SERPL-MCNC: 0.6 MG/DL (ref 0.5–1)
GFR AFRICAN AMERICAN: >60
GFR NON-AFRICAN AMERICAN: >60 ML/MIN/1.73
GLUCOSE BLD-MCNC: 244 MG/DL (ref 74–99)
HBA1C MFR BLD: 10.2 % (ref 4–5.6)
HCT VFR BLD CALC: 43.1 % (ref 34–48)
HEMOGLOBIN: 14.6 G/DL (ref 11.5–15.5)
MCH RBC QN AUTO: 34.2 PG (ref 26–35)
MCHC RBC AUTO-ENTMCNC: 33.9 % (ref 32–34.5)
MCV RBC AUTO: 100.9 FL (ref 80–99.9)
PDW BLD-RTO: 12.2 FL (ref 11.5–15)
PLATELET # BLD: 317 E9/L (ref 130–450)
PMV BLD AUTO: 9.9 FL (ref 7–12)
POTASSIUM SERPL-SCNC: 3.8 MMOL/L (ref 3.5–5)
RBC # BLD: 4.27 E12/L (ref 3.5–5.5)
SODIUM BLD-SCNC: 136 MMOL/L (ref 132–146)
TOTAL CK: 387 U/L (ref 20–180)
TOTAL PROTEIN: 7.4 G/DL (ref 6.4–8.3)
WBC # BLD: 7.1 E9/L (ref 4.5–11.5)

## 2020-06-17 PROCEDURE — 36415 COLL VENOUS BLD VENIPUNCTURE: CPT

## 2020-06-17 PROCEDURE — 82550 ASSAY OF CK (CPK): CPT

## 2020-06-17 PROCEDURE — 82150 ASSAY OF AMYLASE: CPT

## 2020-06-17 PROCEDURE — 80053 COMPREHEN METABOLIC PANEL: CPT

## 2020-06-17 PROCEDURE — 85027 COMPLETE CBC AUTOMATED: CPT

## 2020-06-17 PROCEDURE — 83036 HEMOGLOBIN GLYCOSYLATED A1C: CPT

## 2020-07-31 ENCOUNTER — HOSPITAL ENCOUNTER (OUTPATIENT)
Age: 70
Discharge: HOME | End: 2020-08-02
Payer: COMMERCIAL

## 2020-07-31 LAB
ALBUMIN SERPL-MCNC: 4.6 G/DL (ref 3.5–5.2)
ALP BLD-CCNC: 72 U/L (ref 35–104)
ALT SERPL-CCNC: 57 U/L (ref 0–32)
ANION GAP SERPL CALCULATED.3IONS-SCNC: 18 MMOL/L (ref 7–16)
AST SERPL-CCNC: 41 U/L (ref 0–31)
BASOPHILS ABSOLUTE: 0.03 E9/L (ref 0–0.2)
BASOPHILS RELATIVE PERCENT: 0.3 % (ref 0–2)
BILIRUB SERPL-MCNC: 0.5 MG/DL (ref 0–1.2)
BILIRUBIN DIRECT: <0.2 MG/DL (ref 0–0.3)
BILIRUBIN, INDIRECT: NORMAL MG/DL (ref 0–1)
BUN BLDV-MCNC: 18 MG/DL (ref 8–23)
CALCIUM SERPL-MCNC: 8.9 MG/DL (ref 8.6–10.2)
CHLORIDE BLD-SCNC: 98 MMOL/L (ref 98–107)
CO2: 24 MMOL/L (ref 22–29)
CREAT SERPL-MCNC: 0.7 MG/DL (ref 0.5–1)
EOSINOPHILS ABSOLUTE: 0.04 E9/L (ref 0.05–0.5)
EOSINOPHILS RELATIVE PERCENT: 0.4 % (ref 0–6)
GFR AFRICAN AMERICAN: >60
GFR NON-AFRICAN AMERICAN: >60 ML/MIN/1.73
GLUCOSE BLD-MCNC: 203 MG/DL (ref 74–99)
HBA1C MFR BLD: 8.9 % (ref 4–5.6)
HCT VFR BLD CALC: 44.9 % (ref 34–48)
HEMOGLOBIN: 14.4 G/DL (ref 11.5–15.5)
IMMATURE GRANULOCYTES #: 0.02 E9/L
IMMATURE GRANULOCYTES %: 0.2 % (ref 0–5)
LACTATE DEHYDROGENASE: 348 U/L (ref 135–214)
LYMPHOCYTES ABSOLUTE: 0.71 E9/L (ref 1.5–4)
LYMPHOCYTES RELATIVE PERCENT: 7.7 % (ref 20–42)
MCH RBC QN AUTO: 33.3 PG (ref 26–35)
MCHC RBC AUTO-ENTMCNC: 32.1 % (ref 32–34.5)
MCV RBC AUTO: 103.7 FL (ref 80–99.9)
MONOCYTES ABSOLUTE: 0.63 E9/L (ref 0.1–0.95)
MONOCYTES RELATIVE PERCENT: 6.8 % (ref 2–12)
NEUTROPHILS ABSOLUTE: 7.83 E9/L (ref 1.8–7.3)
NEUTROPHILS RELATIVE PERCENT: 84.6 % (ref 43–80)
PDW BLD-RTO: 12.2 FL (ref 11.5–15)
PHOSPHORUS: 4.2 MG/DL (ref 2.5–4.5)
PLATELET # BLD: 377 E9/L (ref 130–450)
PMV BLD AUTO: 10.3 FL (ref 7–12)
POTASSIUM SERPL-SCNC: 4.2 MMOL/L (ref 3.5–5)
RBC # BLD: 4.33 E12/L (ref 3.5–5.5)
SODIUM BLD-SCNC: 140 MMOL/L (ref 132–146)
TOTAL CK: 391 U/L (ref 20–180)
TOTAL PROTEIN: 6.9 G/DL (ref 6.4–8.3)
URIC ACID, SERUM: 4.6 MG/DL (ref 2.4–5.7)
WBC # BLD: 9.3 E9/L (ref 4.5–11.5)

## 2020-07-31 PROCEDURE — 82550 ASSAY OF CK (CPK): CPT

## 2020-07-31 PROCEDURE — 84100 ASSAY OF PHOSPHORUS: CPT

## 2020-07-31 PROCEDURE — 83036 HEMOGLOBIN GLYCOSYLATED A1C: CPT

## 2020-07-31 PROCEDURE — 85025 COMPLETE CBC W/AUTO DIFF WBC: CPT

## 2020-07-31 PROCEDURE — 80053 COMPREHEN METABOLIC PANEL: CPT

## 2020-07-31 PROCEDURE — 82248 BILIRUBIN DIRECT: CPT

## 2020-07-31 PROCEDURE — 84550 ASSAY OF BLOOD/URIC ACID: CPT

## 2020-07-31 PROCEDURE — 83615 LACTATE (LD) (LDH) ENZYME: CPT

## 2020-10-21 ENCOUNTER — HOSPITAL ENCOUNTER (OUTPATIENT)
Age: 70
Discharge: HOME | End: 2020-10-21
Payer: COMMERCIAL

## 2020-10-21 LAB
ALT SERPL-CCNC: 26 U/L (ref 0–32)
AST SERPL-CCNC: 26 U/L (ref 0–31)
CREAT SERPL-MCNC: 0.6 MG/DL (ref 0.5–1)
GFR AFRICAN AMERICAN: >60
GFR NON-AFRICAN AMERICAN: >60 ML/MIN/1.73
HCT VFR BLD CALC: 42.6 % (ref 34–48)
HEMOGLOBIN: 14.3 G/DL (ref 11.5–15.5)
MCH RBC QN AUTO: 33.5 PG (ref 26–35)
MCHC RBC AUTO-ENTMCNC: 33.6 % (ref 32–34.5)
MCV RBC AUTO: 99.8 FL (ref 80–99.9)
PDW BLD-RTO: 12.2 FL (ref 11.5–15)
PLATELET # BLD: 351 E9/L (ref 130–450)
PMV BLD AUTO: 9.6 FL (ref 7–12)
RBC # BLD: 4.27 E12/L (ref 3.5–5.5)
TOTAL CK: 242 U/L (ref 20–180)
WBC # BLD: 5.6 E9/L (ref 4.5–11.5)

## 2020-10-21 PROCEDURE — 84450 TRANSFERASE (AST) (SGOT): CPT

## 2020-10-21 PROCEDURE — 84460 ALANINE AMINO (ALT) (SGPT): CPT

## 2020-10-21 PROCEDURE — 82565 ASSAY OF CREATININE: CPT

## 2020-10-21 PROCEDURE — 82550 ASSAY OF CK (CPK): CPT

## 2020-10-21 PROCEDURE — 36415 COLL VENOUS BLD VENIPUNCTURE: CPT

## 2020-10-21 PROCEDURE — 85027 COMPLETE CBC AUTOMATED: CPT

## 2020-11-17 ENCOUNTER — HOSPITAL ENCOUNTER (OUTPATIENT)
Age: 70
Discharge: HOME | End: 2020-11-17
Payer: COMMERCIAL

## 2020-11-17 LAB
T4 FREE: 2.28 NG/DL (ref 0.93–1.7)
TSH SERPL DL<=0.05 MIU/L-ACNC: 0.05 UIU/ML (ref 0.27–4.2)

## 2020-11-17 PROCEDURE — 84443 ASSAY THYROID STIM HORMONE: CPT

## 2020-11-17 PROCEDURE — 84439 ASSAY OF FREE THYROXINE: CPT

## 2020-11-17 PROCEDURE — 36415 COLL VENOUS BLD VENIPUNCTURE: CPT

## 2021-01-06 ENCOUNTER — TELEPHONE (OUTPATIENT)
Dept: CARDIOLOGY CLINIC | Age: 71
End: 2021-01-06

## 2021-01-06 NOTE — TELEPHONE ENCOUNTER
Patient Appointment Form:      PCP: King Bhumi   Referring: King Bhumi    Has the Patient:    Seen a Cardiologist? yes    date:over 10yrs ago  [de-identified]  location:not sure    Had a heart catheterization? no    Had heart surgery? no    Had a stress test or nuclear stress test? yes   date: can't remember   facility name:  ?    Had an echocardiogram? no    Had a vascular ultrasound? no    Had a 24/48 heart monitor or extended cardiac event monitor? no    Had recent blood work in the last 6 months? yes    date: 12/30/20    ordering physician: King Bhumi    Had a pacemaker/ICD/ILR implant? no    Seen an Electrophysiologist? no        Will send records via: fax      Date & time of appointment:  01/18/21 @ 2:01 [id number] [employer]

## 2021-01-18 ENCOUNTER — OFFICE VISIT (OUTPATIENT)
Dept: CARDIOLOGY CLINIC | Age: 71
End: 2021-01-18
Payer: COMMERCIAL

## 2021-01-18 VITALS
DIASTOLIC BLOOD PRESSURE: 60 MMHG | RESPIRATION RATE: 16 BRPM | WEIGHT: 141.3 LBS | HEIGHT: 68 IN | BODY MASS INDEX: 21.41 KG/M2 | SYSTOLIC BLOOD PRESSURE: 120 MMHG | HEART RATE: 70 BPM

## 2021-01-18 DIAGNOSIS — I49.3 PVC'S (PREMATURE VENTRICULAR CONTRACTIONS): ICD-10-CM

## 2021-01-18 DIAGNOSIS — E78.00 HYPERCHOLESTEREMIA: ICD-10-CM

## 2021-01-18 DIAGNOSIS — R07.2 PRECORDIAL PAIN: Primary | ICD-10-CM

## 2021-01-18 DIAGNOSIS — Z86.79 H/O SUPRAVENTRICULAR TACHYCARDIA: ICD-10-CM

## 2021-01-18 PROCEDURE — 93000 ELECTROCARDIOGRAM COMPLETE: CPT | Performed by: INTERNAL MEDICINE

## 2021-01-18 PROCEDURE — 99204 OFFICE O/P NEW MOD 45 MIN: CPT | Performed by: INTERNAL MEDICINE

## 2021-01-18 RX ORDER — CHOLECALCIFEROL (VITAMIN D3) 125 MCG
500 CAPSULE ORAL DAILY
COMMUNITY
End: 2022-05-03

## 2021-01-18 RX ORDER — INSULIN GLARGINE 100 [IU]/ML
10 INJECTION, SOLUTION SUBCUTANEOUS NIGHTLY
COMMUNITY
Start: 2020-12-22

## 2021-01-18 NOTE — PROGRESS NOTES
Patient Active Problem List   Diagnosis    Osteoporosis       Current Outpatient Medications   Medication Sig Dispense Refill    LANTUS SOLOSTAR 100 UNIT/ML injection pen 15 Units daily       vitamin B-12 (CYANOCOBALAMIN) 500 MCG tablet Take 500 mcg by mouth daily      rosuvastatin (CRESTOR) 10 MG tablet Take 10 mg by mouth daily       metFORMIN (GLUCOPHAGE) 500 MG tablet Take 500 mg by mouth 2 times daily (with meals)      predniSONE (DELTASONE) 5 MG tablet Take 5 mg by mouth daily       azaTHIOprine (IMURAN) 50 MG tablet Take 50 mg by mouth 2 times daily      acebutolol (SECTRAL) 200 MG capsule Take 200 mg by mouth daily      levothyroxine (SYNTHROID) 125 MCG tablet Take 125 mcg by mouth Daily       famotidine (PEPCID) 20 MG tablet Take 20 mg by mouth 2 times daily      pantoprazole (PROTONIX) 40 MG tablet Take 40 mg by mouth daily      vitamin D (CHOLECALCIFEROL) 1000 UNITS TABS tablet Take 4,000 Units by mouth daily      ascorbic acid (VITAMIN C) 500 MG tablet Take 500 mg by mouth daily      calcium carbonate (TUMS) 500 MG chewable tablet Take 2 tablets by mouth 2 times daily       No current facility-administered medications for this visit. CC:    Patient is seen in follow up for:  1. Precordial pain    2. Hypercholesteremia    3. H/O supraventricular tachycardia    4. PVC's (premature ventricular contractions)        HPI:  Patient is seen in evaluation for chest discomfort. This started about 2 months ago. She notes that she develops this when she is walking. She walks about 1.3 miles daily. She has relief of this discomfort when she stops walking. This is described as moderate in severity. Pressure like. There is no radiation of this discomfort. She has no associated nausea vomiting or diaphoresis. History of PVCs treated with Sectral.  Currently she is having difficulties with hyperthyroidism. Lost 27 pounds. She had previous goiter resection.   Diabetes probably secondary to steroid therapy for her polymyositis. Cholesterol on 2020-LDL 81.     ROS:   General: No unusual weight gain, no change in exercise tolerance  Skin: No rash or itching  EENT: No vision changes or nosebleeds  Cardiovascular: No orthopnea or paroxysmal nocturnal dyspnea  Respiratory: No cough or hemoptysis  Gastrointestinal: No hematemesis or recent changes in bowel habits  Genitourinary: No hematuria, urgency or frequency  Musculoskeletal: No muscular weakness or joint swelling   Neurologic / Psychiatric: No incoordination or convulsions  Allergic / Immunologic/ Lymphatic / Endocrine: No anemia or bleeding tendency    Social History     Socioeconomic History    Marital status:      Spouse name: ivan snyder    Number of children: 2    Years of education: Not on file    Highest education level: Not on file   Occupational History    Occupation: retired rn   Social Needs    Financial resource strain: Not on file    Food insecurity     Worry: Not on file     Inability: Not on file   [employer] needs     Medical: Not on file     Non-medical: Not on file   Tobacco Use    Smoking status: Former Smoker     Quit date: 3/15/1977     Years since quittin.8    Smokeless tobacco: Never Used   Substance and Sexual Activity    Alcohol use: No    Drug use: No    Sexual activity: Not on file   Lifestyle    Physical activity     Days per week: Not on file     Minutes per session: Not on file    Stress: Not on file   Relationships    Social connections     Talks on phone: Not on file     Gets together: Not on file     Attends [employer]ism service: Not on file     Active member of club or organization: Not on file     Attends meetings of clubs or organizations: Not on file     Relationship status: Not on file    Intimate partner violence     Fear of current or ex partner: Not on file     Emotionally abused: Not on file     Physically abused: Not on file     Forced sexual activity: Not on file   Other Topics Concern    Not on file   Social History Narrative    Not on file       Family History   Problem Relation Age of Onset    Diabetes Mother     Heart Disease Mother     Cancer Father     Cancer Sister        Past Medical History:   Diagnosis Date    Celiac disease     Palpitations     Polymyositis ([address]    History of goiter resection. Diabetes. Hyperlipidemia    PHYSICAL EXAM:  CONSTITUTIONAL:  Well developed, well nourished    Vitals:    01/18/21 1417   BP: 120/60   Pulse: 70   Resp: 16   Weight: 141 lb 4.8 oz (64.1 kg)   Height: 5' 8\" (1.727 m)     HEAD & FACE: Normocephalic. Symmetric. EYES: No xanthelasma. Conjunctivae not injected. EARS, NOSE, MOUTH & THROAT: Good dentition. No oral pallor or cyanosis. NECK: No JVD at 30 degrees. No thyromegaly. RESPIRATORY: Clear to auscultation and percussion in all fields. No use of accessory muscle or intercostal retractions. CARDIOVASCULAR: Regular rate and rhythm. No lifts or thrills on palpitation. Auscultation with normal S1-S2 in intensity and splitting. No carotid bruits. Abdominal aorta not enlarged. Femoral arteries without bruits. Pedal pulses 2+. No edema. ABDOMEN: Soft without hepatic or splenic enlargement. No tenderness. MUSCULOSKELETAL: No kyphosis or scoliosis of the back. Good muscle strength and tone. No muscle atrophy. Normal gait and ability to undergo exercise stress testing. EXTREMITIES: No clubbing or cyanosis. SKIN: No Xanthomas or ulcerations. NEUROLOGIC: Oriented to time, place and person. Normal mood and affect. LYMPHATIC:  No palpable neck or supraclavicular nodes. No splenomegaly. EKG: the EKG tracing was reviewed and found to reveal: Normal sinus rhythm. Diffuse ST-T wave changes. No change compared to prior tracing. ASSESSMENT:                                                     ORDERS:       Diagnosis Orders   1.  Precordial pain  EKG 12 lead    NM Cardiac Stress Test Nuclear Imaging    Cardiac Stress Test Exercise- Treadmill   2. Hypercholesteremia     3. H/O supraventricular tachycardia     4. PVC's (premature ventricular contractions)       Will require further evaluation for her exertional chest pain. Will require nuclear imaging for her stress test due to resting EKG changes. PLAN:   See above orders. Medication reconciliation completed. Old records were reviewed and found to reveal: Elevated CPK secondary to polymyositis  Discussed issues that would prompt earlier evaluation. Same cardiac medications.     Follow-up office visit - will call patient with results

## 2021-01-19 ENCOUNTER — HOSPITAL ENCOUNTER (OUTPATIENT)
Dept: MAMMOGRAPHY | Age: 71
Discharge: HOME | End: 2021-01-21
Payer: COMMERCIAL

## 2021-01-19 DIAGNOSIS — M85.80 OSTEOPENIA, UNSPECIFIED LOCATION: ICD-10-CM

## 2021-01-19 DIAGNOSIS — Z12.39 SCREENING BREAST EXAMINATION: ICD-10-CM

## 2021-01-19 PROCEDURE — 77080 DXA BONE DENSITY AXIAL: CPT

## 2021-01-19 PROCEDURE — 77067 SCR MAMMO BI INCL CAD: CPT

## 2021-01-29 ENCOUNTER — TELEPHONE (OUTPATIENT)
Dept: CARDIOLOGY | Age: 71
End: 2021-01-29

## 2021-02-02 ENCOUNTER — HOSPITAL ENCOUNTER (OUTPATIENT)
Dept: CARDIOLOGY | Age: 71
Discharge: HOME | End: 2021-02-02
Payer: COMMERCIAL

## 2021-02-02 VITALS
SYSTOLIC BLOOD PRESSURE: 120 MMHG | RESPIRATION RATE: 16 BRPM | HEART RATE: 68 BPM | DIASTOLIC BLOOD PRESSURE: 70 MMHG | HEIGHT: 68 IN | TEMPERATURE: 97.5 F | WEIGHT: 138 LBS | BODY MASS INDEX: 20.92 KG/M2

## 2021-02-02 DIAGNOSIS — R07.2 PRECORDIAL PAIN: Primary | ICD-10-CM

## 2021-02-02 LAB
LV EF: 67 %
LVEF MODALITY: NORMAL

## 2021-02-02 PROCEDURE — 93017 CV STRESS TEST TRACING ONLY: CPT

## 2021-02-02 PROCEDURE — 2580000003 HC RX 258: Performed by: INTERNAL MEDICINE

## 2021-02-02 PROCEDURE — A9500 TC99M SESTAMIBI: HCPCS | Performed by: INTERNAL MEDICINE

## 2021-02-02 PROCEDURE — 78452 HT MUSCLE IMAGE SPECT MULT: CPT

## 2021-02-02 PROCEDURE — 3430000000 HC RX DIAGNOSTIC RADIOPHARMACEUTICAL: Performed by: INTERNAL MEDICINE

## 2021-02-02 RX ORDER — SODIUM CHLORIDE 0.9 % (FLUSH) 0.9 %
10 SYRINGE (ML) INJECTION PRN
Status: DISCONTINUED | OUTPATIENT
Start: 2021-02-02 | End: 2021-02-03 | Stop reason: HOSPADM

## 2021-02-02 RX ADMIN — Medication 34.1 MILLICURIE: at 09:22

## 2021-02-02 RX ADMIN — Medication 10.1 MILLICURIE: at 07:53

## 2021-02-02 RX ADMIN — SODIUM CHLORIDE, PRESERVATIVE FREE 10 ML: 5 INJECTION INTRAVENOUS at 09:22

## 2021-02-02 RX ADMIN — SODIUM CHLORIDE, PRESERVATIVE FREE 10 ML: 5 INJECTION INTRAVENOUS at 07:53

## 2021-02-02 NOTE — PROCEDURES
[address] [facility] [address]  [phone]                Exercise Stress Nuclear Gated SPECT Study    Name: [address] Account Number: [de-identified]    :  1950      Sex: female              Date of Study:  2021    Height: 5' 8\" (172.7 cm)  Weight: 138 lb (62.6 kg)     Ordering Provider: Ariana Golden MD          PCP: Jesse Holland MD      Cardiologist: Ariana Golden MD                    Interpreting Physician: Emmie Brown MD  _________________________________________________________________________________    Indication:   Detecting the presence and location of coronary artery disease    Clinical History:   Patient has no known history of coronary artery disease. Resting ECG:    NSR 72 bpm.  Normal axis/intervals. Nonspecific anterior T wave changes. Exercise: The patient exercised using a Davide protocol, completing 6:01 minutes and reaching an estimated work load of 7.0 metabolic equivalents (METS). Resting HR was 72. Peak exercise heart rate was 128 (85% of maximum predicted heart rate for age). Baseline /70. Peak exercise /80. The blood pressure response to exercise was normal      Exercise was terminated due to dyspnea. The patient experienced no chest pain with exercise. Exercise ECG:   The patient demonstrated occasional to frequent PVC's and bigeminal PVC's during exercise. Initially occasional PVCs then above HR of 120 they became frequent and bigeminal.  Left bundle morphology with an inferior axis, and V3 transition. PVCs ceased in recovery. No sustained arrhythmias. There was a single couplet during exercise. With exercise, there were no ST segment changes of significance at the heart rate achieved. Staples treadmill score was +6 implying low risk.      IMAGING: Myocardial perfusion imaging was performed at rest 30-35 minutes following the intravenous injection of 10.1 mCi of (Tc-Sestamibi) followed by 10 ml of Normal Saline. At peak exercise, the patient was injected intravenously with 34.1 mCi of (Tc-Sestamibi) followed by 10 ml of Normal Saline. Gated post-stress tomographic imaging was performed 20-25 minutes after stress. FINDINGS: The overall quality of the study was good. Left ventricular cavity size was noted to be normal.    Rotational analog analysis demonstrated abnormal patient motion and soft tissue breast attenuation. The gated SPECT stress imaging in the short, vertical long, and horizontal long axis demonstrated normal homogeneous tracer distribution throughout the myocardium. Gated SPECT left ventricular ejection fraction was calculated to be 67%, with normal myocardial thickening and wall motion. End-diastolic volume 60 ml. TID ratio 1.17. Impression:    1. Exercise EKG was negative for ischemia. 2. Frequent PVCs and bigeminy with exercise. No sustained arrhythmias. 3. The patient experienced no chest pain with exercise. 4. The myocardial perfusion imaging was normal with attenuation artifact. 5. Overall left ventricular systolic function was normal without regional wall motion abnormalities. 6. Staples treadmill score was +6 implying low risk. 7. Exercise capacity was average. 8. Low risk general exercise treadmill test.    Thank you for sending your patient to this [employer].      Electronically signed by Eileen Tran MD on 2/2/21 at 4:09 PM EST

## 2021-02-03 ENCOUNTER — TELEPHONE (OUTPATIENT)
Dept: CARDIOLOGY CLINIC | Age: 71
End: 2021-02-03

## 2021-02-03 NOTE — TELEPHONE ENCOUNTER
----- Message from Elizabeth Marshall MD sent at 2/3/2021  8:33 AM EST -----  Please let patient know that stress test was normal.  However, would like to follow-up with her in 1 month.

## 2021-02-08 ENCOUNTER — HOSPITAL ENCOUNTER (OUTPATIENT)
Age: 71
Discharge: HOME | End: 2021-02-08
Payer: COMMERCIAL

## 2021-02-08 LAB
ALT SERPL-CCNC: 22 U/L (ref 0–32)
AST SERPL-CCNC: 21 U/L (ref 0–31)
BASOPHILS ABSOLUTE: 0.04 E9/L (ref 0–0.2)
BASOPHILS RELATIVE PERCENT: 0.6 % (ref 0–2)
CREAT SERPL-MCNC: 0.5 MG/DL (ref 0.5–1)
EOSINOPHILS ABSOLUTE: 0.06 E9/L (ref 0.05–0.5)
EOSINOPHILS RELATIVE PERCENT: 0.9 % (ref 0–6)
GFR AFRICAN AMERICAN: >60
GFR NON-AFRICAN AMERICAN: >60 ML/MIN/1.73
HCT VFR BLD CALC: 40.9 % (ref 34–48)
HEMOGLOBIN: 13.7 G/DL (ref 11.5–15.5)
IMMATURE GRANULOCYTES #: 0.02 E9/L
IMMATURE GRANULOCYTES %: 0.3 % (ref 0–5)
LYMPHOCYTES ABSOLUTE: 0.99 E9/L (ref 1.5–4)
LYMPHOCYTES RELATIVE PERCENT: 14.8 % (ref 20–42)
MCH RBC QN AUTO: 33.7 PG (ref 26–35)
MCHC RBC AUTO-ENTMCNC: 33.5 % (ref 32–34.5)
MCV RBC AUTO: 100.7 FL (ref 80–99.9)
MONOCYTES ABSOLUTE: 0.8 E9/L (ref 0.1–0.95)
MONOCYTES RELATIVE PERCENT: 12 % (ref 2–12)
NEUTROPHILS ABSOLUTE: 4.76 E9/L (ref 1.8–7.3)
NEUTROPHILS RELATIVE PERCENT: 71.4 % (ref 43–80)
PDW BLD-RTO: 12.3 FL (ref 11.5–15)
PLATELET # BLD: 335 E9/L (ref 130–450)
PMV BLD AUTO: 9.8 FL (ref 7–12)
RBC # BLD: 4.06 E12/L (ref 3.5–5.5)
T4 FREE: 2.15 NG/DL (ref 0.93–1.7)
TOTAL CK: 178 U/L (ref 20–180)
TSH SERPL DL<=0.05 MIU/L-ACNC: 0.1 UIU/ML (ref 0.27–4.2)
WBC # BLD: 6.7 E9/L (ref 4.5–11.5)

## 2021-02-08 PROCEDURE — 84439 ASSAY OF FREE THYROXINE: CPT

## 2021-02-08 PROCEDURE — 82565 ASSAY OF CREATININE: CPT

## 2021-02-08 PROCEDURE — 85025 COMPLETE CBC W/AUTO DIFF WBC: CPT

## 2021-02-08 PROCEDURE — 84450 TRANSFERASE (AST) (SGOT): CPT

## 2021-02-08 PROCEDURE — 84460 ALANINE AMINO (ALT) (SGPT): CPT

## 2021-02-08 PROCEDURE — 36415 COLL VENOUS BLD VENIPUNCTURE: CPT

## 2021-02-08 PROCEDURE — 84443 ASSAY THYROID STIM HORMONE: CPT

## 2021-02-08 PROCEDURE — 82550 ASSAY OF CK (CPK): CPT

## 2021-03-03 ENCOUNTER — OFFICE VISIT (OUTPATIENT)
Dept: CARDIOLOGY CLINIC | Age: 71
End: 2021-03-03
Payer: COMMERCIAL

## 2021-03-03 VITALS
RESPIRATION RATE: 16 BRPM | HEART RATE: 74 BPM | DIASTOLIC BLOOD PRESSURE: 68 MMHG | WEIGHT: 142 LBS | HEIGHT: 69 IN | SYSTOLIC BLOOD PRESSURE: 130 MMHG | BODY MASS INDEX: 21.03 KG/M2

## 2021-03-03 DIAGNOSIS — I49.3 PVC'S (PREMATURE VENTRICULAR CONTRACTIONS): ICD-10-CM

## 2021-03-03 DIAGNOSIS — R07.2 PRECORDIAL PAIN: Primary | ICD-10-CM

## 2021-03-03 DIAGNOSIS — E78.00 HYPERCHOLESTEREMIA: ICD-10-CM

## 2021-03-03 DIAGNOSIS — Z86.79 H/O SUPRAVENTRICULAR TACHYCARDIA: ICD-10-CM

## 2021-03-03 PROCEDURE — 93000 ELECTROCARDIOGRAM COMPLETE: CPT | Performed by: INTERNAL MEDICINE

## 2021-03-03 PROCEDURE — 99213 OFFICE O/P EST LOW 20 MIN: CPT | Performed by: INTERNAL MEDICINE

## 2021-03-03 NOTE — PROGRESS NOTES
frequency  Musculoskeletal: No muscular weakness or joint swelling   Neurologic / Psychiatric: No incoordination or convulsions  Allergic / Immunologic/ Lymphatic / Endocrine: No anemia or bleeding tendency    Social History     Socioeconomic History    Marital status:      Spouse name: ivan snyder    Number of children: 2    Years of education: Not on file    Highest education level: Not on file   Occupational History    Occupation: retired rn   Social Needs    Financial resource strain: Not on file    Food insecurity     Worry: Not on file     Inability: Not on file   [employer] needs     Medical: Not on file     Non-medical: Not on file   Tobacco Use    Smoking status: Former Smoker     Quit date: 3/15/1977     Years since quittin.9    Smokeless tobacco: Never Used   Substance and Sexual Activity    Alcohol use: No    Drug use: No    Sexual activity: Not on file   Lifestyle    Physical activity     Days per week: Not on file     Minutes per session: Not on file    Stress: Not on file   Relationships    Social connections     Talks on phone: Not on file     Gets together: Not on file     Attends Islam service: Not on file     Active member of club or organization: Not on file     Attends meetings of clubs or organizations: Not on file     Relationship status: Not on file    Intimate partner violence     Fear of current or ex partner: Not on file     Emotionally abused: Not on file     Physically abused: Not on file     Forced sexual activity: Not on file   Other Topics Concern    Not on file   Social History Narrative    Not on file       Family History   Problem Relation Age of Onset    Diabetes Mother     Heart Disease Mother     Cancer Father     Cancer Sister        Past Medical History:   Diagnosis Date    Celiac disease     Palpitations     Polymyositis ([facility] [address]        PHYSICAL EXAM:  CONSTITUTIONAL:  Well developed, well nourished    Vitals:    21 0940   BP: 130/68   Pulse: 74   Resp: 16   Weight: 142 lb (64.4 kg)   Height: 5' 8.5\" (1.74 m)     HEAD & FACE: Normocephalic. Symmetric. EYES: No xanthelasma. Conjunctivae not injected. EARS, NOSE, MOUTH & THROAT: Good dentition. No oral pallor or cyanosis. NECK: No JVD at 30 degrees. No thyromegaly. RESPIRATORY: Clear to auscultation and percussion in all fields. No use of accessory muscle or intercostal retractions. CARDIOVASCULAR: Regular rate and rhythm. No lifts or thrills on palpitation. Auscultation with normal S1-S2 in intensity and splitting. No carotid bruits. Abdominal aorta not enlarged. Femoral arteries without bruits. Pedal pulses 2+. No edema. ABDOMEN: Soft without hepatic or splenic enlargement. No tenderness. MUSCULOSKELETAL: No kyphosis or scoliosis of the back. Good muscle strength and tone. No muscle atrophy. Normal gait and ability to undergo exercise stress testing. EXTREMITIES: No clubbing or cyanosis. SKIN: No Xanthomas or ulcerations. NEUROLOGIC: Oriented to time, place and person. Normal mood and affect. LYMPHATIC:  No palpable neck or supraclavicular nodes. No splenomegaly. EKG: the EKG tracing was reviewed and found to reveal: Normal sinus rhythm. PVCs.  ms. No change compared to prior tracing. ASSESSMENT:                                                     ORDERS:       Diagnosis Orders   1. Precordial pain  EKG 12 Lead   2. Hypercholesteremia     3. H/O supraventricular tachycardia     4. PVC's (premature ventricular contractions)       Above assessment cardiac issues stable. PLAN:   See above orders. Medication reconciliation completed. Old records were reviewed and found to reveal: Chronic PVCs. Discussed issues that would prompt earlier evaluation. Same cardiac medications. Follow-up office visit prn basis.

## 2021-03-31 ENCOUNTER — HOSPITAL ENCOUNTER (OUTPATIENT)
Age: 71
Discharge: HOME | End: 2021-03-31
Payer: COMMERCIAL

## 2021-03-31 LAB
T4 FREE: 1.68 NG/DL (ref 0.93–1.7)
TSH SERPL DL<=0.05 MIU/L-ACNC: 3.98 UIU/ML (ref 0.27–4.2)

## 2021-03-31 PROCEDURE — 36415 COLL VENOUS BLD VENIPUNCTURE: CPT

## 2021-03-31 PROCEDURE — 84443 ASSAY THYROID STIM HORMONE: CPT

## 2021-03-31 PROCEDURE — 84439 ASSAY OF FREE THYROXINE: CPT

## 2021-05-28 ENCOUNTER — HOSPITAL ENCOUNTER (OUTPATIENT)
Age: 71
Discharge: HOME | End: 2021-05-28
Payer: COMMERCIAL

## 2021-05-28 LAB
ALT SERPL-CCNC: 27 U/L (ref 0–32)
AST SERPL-CCNC: 35 U/L (ref 0–31)
CREAT SERPL-MCNC: 0.5 MG/DL (ref 0.5–1)
GFR AFRICAN AMERICAN: >60
GFR NON-AFRICAN AMERICAN: >60 ML/MIN/1.73
HCT VFR BLD CALC: 40.2 % (ref 34–48)
HEMOGLOBIN: 13.3 G/DL (ref 11.5–15.5)
MCH RBC QN AUTO: 33.7 PG (ref 26–35)
MCHC RBC AUTO-ENTMCNC: 33.1 % (ref 32–34.5)
MCV RBC AUTO: 101.8 FL (ref 80–99.9)
PDW BLD-RTO: 12.7 FL (ref 11.5–15)
PLATELET # BLD: 331 E9/L (ref 130–450)
PMV BLD AUTO: 9.5 FL (ref 7–12)
RBC # BLD: 3.95 E12/L (ref 3.5–5.5)
TOTAL CK: 685 U/L (ref 20–180)
WBC # BLD: 6.9 E9/L (ref 4.5–11.5)

## 2021-05-28 PROCEDURE — 82550 ASSAY OF CK (CPK): CPT

## 2021-05-28 PROCEDURE — 36415 COLL VENOUS BLD VENIPUNCTURE: CPT

## 2021-05-28 PROCEDURE — 85027 COMPLETE CBC AUTOMATED: CPT

## 2021-05-28 PROCEDURE — 84460 ALANINE AMINO (ALT) (SGPT): CPT

## 2021-05-28 PROCEDURE — 84450 TRANSFERASE (AST) (SGOT): CPT

## 2021-05-28 PROCEDURE — 82565 ASSAY OF CREATININE: CPT

## 2021-06-23 ENCOUNTER — HOSPITAL ENCOUNTER (EMERGENCY)
Age: 71
Discharge: HOME | End: 2021-06-23
Payer: COMMERCIAL

## 2021-06-23 ENCOUNTER — NURSE TRIAGE (OUTPATIENT)
Dept: OTHER | Facility: CLINIC | Age: 71
End: 2021-06-23

## 2021-06-23 VITALS
DIASTOLIC BLOOD PRESSURE: 67 MMHG | HEIGHT: 69 IN | SYSTOLIC BLOOD PRESSURE: 163 MMHG | RESPIRATION RATE: 14 BRPM | TEMPERATURE: 97.1 F | HEART RATE: 72 BPM | WEIGHT: 140 LBS | BODY MASS INDEX: 20.73 KG/M2 | OXYGEN SATURATION: 96 %

## 2021-06-23 DIAGNOSIS — S91.012A LACERATION OF LEFT ANKLE, INITIAL ENCOUNTER: Primary | ICD-10-CM

## 2021-06-23 DIAGNOSIS — Z23 NEED FOR TDAP VACCINATION: ICD-10-CM

## 2021-06-23 PROCEDURE — 6370000000 HC RX 637 (ALT 250 FOR IP): Performed by: NURSE PRACTITIONER

## 2021-06-23 PROCEDURE — 90471 IMMUNIZATION ADMIN: CPT | Performed by: NURSE PRACTITIONER

## 2021-06-23 PROCEDURE — 90715 TDAP VACCINE 7 YRS/> IM: CPT | Performed by: NURSE PRACTITIONER

## 2021-06-23 PROCEDURE — 12001 RPR S/N/AX/GEN/TRNK 2.5CM/<: CPT

## 2021-06-23 PROCEDURE — 6360000002 HC RX W HCPCS: Performed by: NURSE PRACTITIONER

## 2021-06-23 PROCEDURE — 99283 EMERGENCY DEPT VISIT LOW MDM: CPT

## 2021-06-23 PROCEDURE — 2500000003 HC RX 250 WO HCPCS: Performed by: NURSE PRACTITIONER

## 2021-06-23 RX ORDER — GINSENG 100 MG
CAPSULE ORAL ONCE
Status: COMPLETED | OUTPATIENT
Start: 2021-06-23 | End: 2021-06-23

## 2021-06-23 RX ORDER — CEPHALEXIN 500 MG/1
500 CAPSULE ORAL 4 TIMES DAILY
Qty: 20 CAPSULE | Refills: 0 | Status: SHIPPED | OUTPATIENT
Start: 2021-06-23 | End: 2021-06-28

## 2021-06-23 RX ORDER — BACITRACIN, NEOMYCIN, POLYMYXIN B 400; 3.5; 5 [USP'U]/G; MG/G; [USP'U]/G
OINTMENT TOPICAL 2 TIMES DAILY
Qty: 1 TUBE | Refills: 0 | Status: SHIPPED | OUTPATIENT
Start: 2021-06-23 | End: 2021-06-30

## 2021-06-23 RX ORDER — LIDOCAINE HYDROCHLORIDE 10 MG/ML
5 INJECTION, SOLUTION INFILTRATION; PERINEURAL ONCE
Status: COMPLETED | OUTPATIENT
Start: 2021-06-23 | End: 2021-06-23

## 2021-06-23 RX ADMIN — BACITRACIN: 500 OINTMENT TOPICAL at 17:47

## 2021-06-23 RX ADMIN — LIDOCAINE HYDROCHLORIDE 5 ML: 10 INJECTION, SOLUTION INFILTRATION; PERINEURAL at 17:47

## 2021-06-23 RX ADMIN — TETANUS TOXOID, REDUCED DIPHTHERIA TOXOID AND ACELLULAR PERTUSSIS VACCINE, ADSORBED 0.5 ML: 5; 2.5; 8; 8; 2.5 SUSPENSION INTRAMUSCULAR at 17:47

## 2021-06-23 ASSESSMENT — PAIN DESCRIPTION - LOCATION: LOCATION: FOOT

## 2021-06-23 ASSESSMENT — PAIN DESCRIPTION - PAIN TYPE: TYPE: ACUTE PAIN

## 2021-06-23 ASSESSMENT — PAIN DESCRIPTION - DESCRIPTORS: DESCRIPTORS: BURNING

## 2021-06-23 ASSESSMENT — PAIN SCALES - GENERAL
PAINLEVEL_OUTOF10: 4
PAINLEVEL_OUTOF10: 4

## 2021-06-23 ASSESSMENT — PAIN DESCRIPTION - ORIENTATION: ORIENTATION: LEFT

## 2021-06-23 ASSESSMENT — PAIN DESCRIPTION - FREQUENCY: FREQUENCY: CONTINUOUS

## 2021-06-23 ASSESSMENT — PAIN DESCRIPTION - PROGRESSION: CLINICAL_PROGRESSION: NOT CHANGED

## 2021-06-23 NOTE — TELEPHONE ENCOUNTER
Reason for Disposition   Skin is split open or gaping (length > 1/2 inch or 12 mm on the skin, 1/4 inch or 6 mm on the face)    Answer Assessment - Initial Assessment Questions  1. APPEARANCE of INJURY: \"What does the injury look like? \"       Left foot laceration inner aspect of ankle gaping open    2. SIZE: \"How large is the cut? \"       Gaping 1 inch l shape    3. BLEEDING: \"Is it bleeding now? \" If so, ask: \"Is it difficult to stop? \"       Some bleeding but under control    4. LOCATION: \"Where is the injury located? \"       Left foot    5. ONSET: \"How long ago did the injury occur? \"       20 min    6. MECHANISM: \"Tell me how it happened. \"       Caught in sorm door as closing    7. TETANUS: \"When was the last tetanus booster? \"      Needs    8. PREGNANCY: \"Is there any chance you are pregnant? \" \"When was your last menstrual period? \"      N/a    Protocols used: SKIN INJURY-ADULT-[place]    Brief description of triage: as above pt caught foot in a lizzy door has l shape laceration f to left foot inner aspect near ankle bleeding under control but states is gaping open and unsure of last tetnas      Triage indicates for patient to go to er/ucc with pcp approval office closed so sent to er    Care advice provided, patient verbalizes understanding; denies any other questions or concerns; instructed to call back for any new or worsening symptoms. This triage is a result of a call to [address] [place] [address]. Please do not respond to the triage nurse through this encounter. Any subsequent communication should be directly with the patient.

## 2021-06-23 NOTE — ED PROVIDER NOTES
exam. Negative Igor's sign. No cords or calf tenderness. No significant calf/ankle edema. .            Deformity: No.               ROM: full range with pain. Skin:  a laceration 1.75cm. Neurovascular: Motor deficit: none. Sensory deficit: none. Pulse deficit: none. Capillary refill: normal.  Gait:  normal without use of mobility aid. Lymphatics: No lymphangitis or adenopathy noted. Neurological:  Oriented. Motor functions intact. Lab / Imaging Results   (All laboratory and radiology results have been personally reviewed by myself)  Labs:  No results found for this visit on 06/23/21. Imaging: All Radiology results interpreted by Radiologist unless otherwise noted. No orders to display     ED Course / Medical Decision Making     Medications   Tetanus-Diphth-Acell Pertussis (BOOSTRIX) injection 0.5 mL (0.5 mLs Intramuscular Given 6/23/21 1747)   bacitracin ointment ( Topical Given 6/23/21 1747)   lidocaine 1 % injection 5 mL (5 mLs Intradermal Given 6/23/21 1747)        Re-examination:  6/23/21       Time: 1835-wound care was completed, sutures were placed and sterile fashion. 4 sutures were placed. Bacitracin and sterile dressing was placed over top. Discussed appropriate wound care, sinuses with infection and have sutures removed in 7 to 10 days. Due to patient having neuropathy and being a diabetic. Patient be discharged home with Keflex for prophylactic infection due to being a dirty storm door. Patient will also be discharged home with Neosporin. Discussed potential side effects and appropriate starting these medications. She states verbal understanding. Consult(s):   None    Procedure(s):     LACERATION REPAIR  PROCEDURE NOTE:  Unless otherwise indicated, this procedure was done or directly supervised by the ED attending. Laceration #: 1. Location: left medial heel/ankle  Length: 1.75cm.   The wound area was irrigated with sterile saline, cleansend with shur-clens and draped in a sterile fashion. The wound area was anesthetized with Lidocaine 1% without epinephrine. WOUND COMPLEXITY:    Debridement: None. Undermining: None. Wound Margins Revised: None. Flaps Aligned: no. The wound was explored with the following results no foreign body or tendon injury seen. The wound was closed with 5-0 Ethilon using interrupted sutures. Dressing:  bacitracin and a sterile dressing was placed. Total number suture: 4      MDM:   Patient presents to the ED for laceration to the medial aspect of the left ankle. Differential diagnoses included but not limited to laceration versus abrasion. Workup in the ED revealed upon examination patient had a 1.75 cm laceration to the medial aspect. Wound care was completed with irrigation and Shur-Clens. No foreign body or tendon injury seen. Patient has normal flexion extension and distal pulses. Cap refills less than 3 seconds. Tdap vaccination was updated today. 4 sutures were placed in sterile fashion. Patient continues to be non-toxic on re-evaluation. Findings were discussed with the patient and reasons to immediately return to the ED were articulated to them. They will follow-up with their PMD.      Plan of Care/Counseling:  GENIA Huang CNP reviewed today's visit with the patient in addition to providing specific details for the plan of care and counseling regarding the diagnosis and prognosis. Questions are answered at this time and are agreeable with the plan. Assessment      1. Laceration of left ankle, initial encounter    2. Need for Tdap vaccination      Plan   Discharged home.   Patient condition is stable    New Medications     New Prescriptions    CEPHALEXIN (KEFLEX) 500 MG CAPSULE    Take 1 capsule by mouth 4 times daily for 5 days    NEOMYCIN-BACITRACIN-POLYMYXIN (NEOSPORIN) 400-5-5000 OINTMENT    Apply topically 2 times daily for 7 days Apply topically 2 times daily. Electronically signed by GENIA Monk CNP   DD: 6/23/21  **This report was transcribed using voice recognition software. Every effort was made to ensure accuracy; however, inadvertent computerized transcription errors may be present.   END OF ED PROVIDER NOTE       GENIA Monk CNP  06/23/21 [id number]

## 2021-07-06 ENCOUNTER — HOSPITAL ENCOUNTER (OUTPATIENT)
Age: 71
Discharge: HOME | End: 2021-07-06
Payer: COMMERCIAL

## 2021-07-06 LAB — TOTAL CK: 327 U/L (ref 20–180)

## 2021-07-06 PROCEDURE — 82550 ASSAY OF CK (CPK): CPT

## 2021-07-06 PROCEDURE — 36415 COLL VENOUS BLD VENIPUNCTURE: CPT

## 2021-10-25 ENCOUNTER — HOSPITAL ENCOUNTER (OUTPATIENT)
Age: 71
Discharge: HOME | End: 2021-10-25
Payer: COMMERCIAL

## 2021-10-25 LAB
ALBUMIN SERPL-MCNC: 4.6 G/DL (ref 3.5–5.2)
ALP BLD-CCNC: 53 U/L (ref 35–104)
ALT SERPL-CCNC: 20 U/L (ref 0–32)
ANION GAP SERPL CALCULATED.3IONS-SCNC: 12 MMOL/L (ref 7–16)
AST SERPL-CCNC: 24 U/L (ref 0–31)
BILIRUB SERPL-MCNC: 0.7 MG/DL (ref 0–1.2)
BUN BLDV-MCNC: 17 MG/DL (ref 6–23)
CALCIUM SERPL-MCNC: 9.3 MG/DL (ref 8.6–10.2)
CHLORIDE BLD-SCNC: 101 MMOL/L (ref 98–107)
CO2: 28 MMOL/L (ref 22–29)
CREAT SERPL-MCNC: 0.6 MG/DL (ref 0.5–1)
GFR AFRICAN AMERICAN: >60
GFR NON-AFRICAN AMERICAN: >60 ML/MIN/1.73
GLUCOSE BLD-MCNC: 118 MG/DL (ref 74–99)
HCT VFR BLD CALC: 43 % (ref 34–48)
HEMOGLOBIN: 14.4 G/DL (ref 11.5–15.5)
MCH RBC QN AUTO: 34.4 PG (ref 26–35)
MCHC RBC AUTO-ENTMCNC: 33.5 % (ref 32–34.5)
MCV RBC AUTO: 102.6 FL (ref 80–99.9)
PDW BLD-RTO: 12.6 FL (ref 11.5–15)
PLATELET # BLD: 340 E9/L (ref 130–450)
PMV BLD AUTO: 9.4 FL (ref 7–12)
POTASSIUM SERPL-SCNC: 4 MMOL/L (ref 3.5–5)
RBC # BLD: 4.19 E12/L (ref 3.5–5.5)
SODIUM BLD-SCNC: 141 MMOL/L (ref 132–146)
T4 FREE: 1.61 NG/DL (ref 0.93–1.7)
TOTAL CK: 220 U/L (ref 20–180)
TOTAL PROTEIN: 7.1 G/DL (ref 6.4–8.3)
TSH SERPL DL<=0.05 MIU/L-ACNC: 4.7 UIU/ML (ref 0.27–4.2)
WBC # BLD: 5.4 E9/L (ref 4.5–11.5)

## 2021-10-25 PROCEDURE — 84439 ASSAY OF FREE THYROXINE: CPT

## 2021-10-25 PROCEDURE — 85027 COMPLETE CBC AUTOMATED: CPT

## 2021-10-25 PROCEDURE — 36415 COLL VENOUS BLD VENIPUNCTURE: CPT

## 2021-10-25 PROCEDURE — 84443 ASSAY THYROID STIM HORMONE: CPT

## 2021-10-25 PROCEDURE — 80053 COMPREHEN METABOLIC PANEL: CPT

## 2021-10-25 PROCEDURE — 82550 ASSAY OF CK (CPK): CPT

## 2021-12-15 ENCOUNTER — HOSPITAL ENCOUNTER (OUTPATIENT)
Age: 71
Discharge: HOME | End: 2021-12-15
Payer: COMMERCIAL

## 2021-12-15 LAB
T4 FREE: 2.01 NG/DL (ref 0.93–1.7)
TSH SERPL DL<=0.05 MIU/L-ACNC: 0.22 UIU/ML (ref 0.27–4.2)

## 2021-12-15 PROCEDURE — 36415 COLL VENOUS BLD VENIPUNCTURE: CPT

## 2021-12-15 PROCEDURE — 84443 ASSAY THYROID STIM HORMONE: CPT

## 2021-12-15 PROCEDURE — 84439 ASSAY OF FREE THYROXINE: CPT

## 2022-01-26 ENCOUNTER — HOSPITAL ENCOUNTER (OUTPATIENT)
Age: 72
Discharge: HOME | End: 2022-01-26
Payer: COMMERCIAL

## 2022-01-26 LAB
ALBUMIN SERPL-MCNC: 4.7 G/DL (ref 3.5–5.2)
ALP BLD-CCNC: 81 U/L (ref 35–104)
ALT SERPL-CCNC: 19 U/L (ref 0–32)
ANION GAP SERPL CALCULATED.3IONS-SCNC: 13 MMOL/L (ref 7–16)
AST SERPL-CCNC: 27 U/L (ref 0–31)
BILIRUB SERPL-MCNC: 0.3 MG/DL (ref 0–1.2)
BUN BLDV-MCNC: 19 MG/DL (ref 6–23)
CALCIUM SERPL-MCNC: 9.5 MG/DL (ref 8.6–10.2)
CHLORIDE BLD-SCNC: 98 MMOL/L (ref 98–107)
CHOLESTEROL, TOTAL: 184 MG/DL (ref 0–199)
CO2: 27 MMOL/L (ref 22–29)
CREAT SERPL-MCNC: 0.8 MG/DL (ref 0.5–1)
CREATININE URINE: 70 MG/DL (ref 29–226)
GFR AFRICAN AMERICAN: >60
GFR NON-AFRICAN AMERICAN: >60 ML/MIN/1.73
GLUCOSE BLD-MCNC: 149 MG/DL (ref 74–99)
HDLC SERPL-MCNC: 79 MG/DL
LDL CHOLESTEROL CALCULATED: 76 MG/DL (ref 0–99)
MICROALBUMIN UR-MCNC: <12 MG/L
MICROALBUMIN/CREAT UR-RTO: ABNORMAL (ref 0–30)
POTASSIUM SERPL-SCNC: 4.5 MMOL/L (ref 3.5–5)
SODIUM BLD-SCNC: 138 MMOL/L (ref 132–146)
T4 FREE: 1.78 NG/DL (ref 0.93–1.7)
TOTAL PROTEIN: 7.4 G/DL (ref 6.4–8.3)
TRIGL SERPL-MCNC: 143 MG/DL (ref 0–149)
TSH SERPL DL<=0.05 MIU/L-ACNC: 0.43 UIU/ML (ref 0.27–4.2)
VLDLC SERPL CALC-MCNC: 29 MG/DL

## 2022-01-26 PROCEDURE — 82044 UR ALBUMIN SEMIQUANTITATIVE: CPT

## 2022-01-26 PROCEDURE — 84439 ASSAY OF FREE THYROXINE: CPT

## 2022-01-26 PROCEDURE — 84443 ASSAY THYROID STIM HORMONE: CPT

## 2022-01-26 PROCEDURE — 82570 ASSAY OF URINE CREATININE: CPT

## 2022-01-26 PROCEDURE — 36415 COLL VENOUS BLD VENIPUNCTURE: CPT

## 2022-01-26 PROCEDURE — 80053 COMPREHEN METABOLIC PANEL: CPT

## 2022-01-26 PROCEDURE — 80061 LIPID PANEL: CPT

## 2022-02-15 ENCOUNTER — HOSPITAL ENCOUNTER (OUTPATIENT)
Dept: MAMMOGRAPHY | Age: 72
Discharge: HOME | End: 2022-02-17
Payer: COMMERCIAL

## 2022-02-15 DIAGNOSIS — Z12.31 ENCOUNTER FOR SCREENING MAMMOGRAM FOR MALIGNANT NEOPLASM OF BREAST: ICD-10-CM

## 2022-02-15 PROCEDURE — 77067 SCR MAMMO BI INCL CAD: CPT

## 2022-02-23 ENCOUNTER — HOSPITAL ENCOUNTER (OUTPATIENT)
Age: 72
Discharge: HOME | End: 2022-02-23
Payer: COMMERCIAL

## 2022-02-23 LAB
ALBUMIN SERPL-MCNC: 4.5 G/DL (ref 3.5–5.2)
ALP BLD-CCNC: 68 U/L (ref 35–104)
ALT SERPL-CCNC: 38 U/L (ref 0–32)
ANION GAP SERPL CALCULATED.3IONS-SCNC: 12 MMOL/L (ref 7–16)
AST SERPL-CCNC: 49 U/L (ref 0–31)
BILIRUB SERPL-MCNC: 0.4 MG/DL (ref 0–1.2)
BUN BLDV-MCNC: 21 MG/DL (ref 6–23)
CALCIUM SERPL-MCNC: 8.9 MG/DL (ref 8.6–10.2)
CHLORIDE BLD-SCNC: 101 MMOL/L (ref 98–107)
CO2: 27 MMOL/L (ref 22–29)
CREAT SERPL-MCNC: 0.6 MG/DL (ref 0.5–1)
GFR AFRICAN AMERICAN: >60
GFR NON-AFRICAN AMERICAN: >60 ML/MIN/1.73
GLUCOSE BLD-MCNC: 118 MG/DL (ref 74–99)
HCT VFR BLD CALC: 43.1 % (ref 34–48)
HEMOGLOBIN: 14 G/DL (ref 11.5–15.5)
MCH RBC QN AUTO: 33.3 PG (ref 26–35)
MCHC RBC AUTO-ENTMCNC: 32.5 % (ref 32–34.5)
MCV RBC AUTO: 102.6 FL (ref 80–99.9)
PDW BLD-RTO: 12.1 FL (ref 11.5–15)
PLATELET # BLD: 334 E9/L (ref 130–450)
PMV BLD AUTO: 9.5 FL (ref 7–12)
POTASSIUM SERPL-SCNC: 4 MMOL/L (ref 3.5–5)
RBC # BLD: 4.2 E12/L (ref 3.5–5.5)
SODIUM BLD-SCNC: 140 MMOL/L (ref 132–146)
TOTAL CK: 244 U/L (ref 20–180)
TOTAL PROTEIN: 6.9 G/DL (ref 6.4–8.3)
WBC # BLD: 5.9 E9/L (ref 4.5–11.5)

## 2022-02-23 PROCEDURE — 82550 ASSAY OF CK (CPK): CPT

## 2022-02-23 PROCEDURE — 36415 COLL VENOUS BLD VENIPUNCTURE: CPT

## 2022-02-23 PROCEDURE — 80053 COMPREHEN METABOLIC PANEL: CPT

## 2022-02-23 PROCEDURE — 85027 COMPLETE CBC AUTOMATED: CPT

## 2022-04-04 ENCOUNTER — HOSPITAL ENCOUNTER (OUTPATIENT)
Age: 72
Discharge: HOME | End: 2022-04-04
Payer: COMMERCIAL

## 2022-04-04 LAB
ALBUMIN SERPL-MCNC: 4.4 G/DL (ref 3.5–5.2)
ALP BLD-CCNC: 55 U/L (ref 35–104)
ALT SERPL-CCNC: 22 U/L (ref 0–32)
ANION GAP SERPL CALCULATED.3IONS-SCNC: 11 MMOL/L (ref 7–16)
AST SERPL-CCNC: 26 U/L (ref 0–31)
BASOPHILS ABSOLUTE: 0.03 E9/L (ref 0–0.2)
BASOPHILS RELATIVE PERCENT: 0.5 % (ref 0–2)
BILIRUB SERPL-MCNC: 0.6 MG/DL (ref 0–1.2)
BILIRUBIN DIRECT: <0.2 MG/DL (ref 0–0.3)
BILIRUBIN, INDIRECT: NORMAL MG/DL (ref 0–1)
BUN BLDV-MCNC: 12 MG/DL (ref 6–23)
CALCIUM SERPL-MCNC: 8.7 MG/DL (ref 8.6–10.2)
CHLORIDE BLD-SCNC: 102 MMOL/L (ref 98–107)
CHOLESTEROL, TOTAL: 165 MG/DL (ref 0–199)
CO2: 27 MMOL/L (ref 22–29)
CREAT SERPL-MCNC: 0.5 MG/DL (ref 0.5–1)
EOSINOPHILS ABSOLUTE: 0.07 E9/L (ref 0.05–0.5)
EOSINOPHILS RELATIVE PERCENT: 1.3 % (ref 0–6)
GFR AFRICAN AMERICAN: >60
GFR NON-AFRICAN AMERICAN: >60 ML/MIN/1.73
GLUCOSE BLD-MCNC: 101 MG/DL (ref 74–99)
HBA1C MFR BLD: 6.5 % (ref 4–5.6)
HCT VFR BLD CALC: 43.4 % (ref 34–48)
HDLC SERPL-MCNC: 68 MG/DL
HEMOGLOBIN: 14.3 G/DL (ref 11.5–15.5)
IMMATURE GRANULOCYTES #: 0.02 E9/L
IMMATURE GRANULOCYTES %: 0.4 % (ref 0–5)
LDL CHOLESTEROL CALCULATED: 68 MG/DL (ref 0–99)
LYMPHOCYTES ABSOLUTE: 0.96 E9/L (ref 1.5–4)
LYMPHOCYTES RELATIVE PERCENT: 17.3 % (ref 20–42)
MCH RBC QN AUTO: 33 PG (ref 26–35)
MCHC RBC AUTO-ENTMCNC: 32.9 % (ref 32–34.5)
MCV RBC AUTO: 100.2 FL (ref 80–99.9)
MONOCYTES ABSOLUTE: 0.68 E9/L (ref 0.1–0.95)
MONOCYTES RELATIVE PERCENT: 12.3 % (ref 2–12)
NEUTROPHILS ABSOLUTE: 3.78 E9/L (ref 1.8–7.3)
NEUTROPHILS RELATIVE PERCENT: 68.2 % (ref 43–80)
PDW BLD-RTO: 12.2 FL (ref 11.5–15)
PLATELET # BLD: 325 E9/L (ref 130–450)
PMV BLD AUTO: 9.7 FL (ref 7–12)
POTASSIUM SERPL-SCNC: 3.7 MMOL/L (ref 3.5–5)
RBC # BLD: 4.33 E12/L (ref 3.5–5.5)
SODIUM BLD-SCNC: 140 MMOL/L (ref 132–146)
T4 TOTAL: 11.2 MCG/DL (ref 4.5–11.7)
TOTAL CK: 250 U/L (ref 20–180)
TOTAL PROTEIN: 7.1 G/DL (ref 6.4–8.3)
TRIGL SERPL-MCNC: 144 MG/DL (ref 0–149)
TSH SERPL DL<=0.05 MIU/L-ACNC: 1.13 UIU/ML (ref 0.27–4.2)
URIC ACID, SERUM: 3.9 MG/DL (ref 2.4–5.7)
VITAMIN B-12: 1270 PG/ML (ref 211–946)
VITAMIN D 25-HYDROXY: 60 NG/ML (ref 30–100)
VLDLC SERPL CALC-MCNC: 29 MG/DL
WBC # BLD: 5.5 E9/L (ref 4.5–11.5)

## 2022-04-04 PROCEDURE — 82306 VITAMIN D 25 HYDROXY: CPT

## 2022-04-04 PROCEDURE — 82550 ASSAY OF CK (CPK): CPT

## 2022-04-04 PROCEDURE — 82248 BILIRUBIN DIRECT: CPT

## 2022-04-04 PROCEDURE — 80053 COMPREHEN METABOLIC PANEL: CPT

## 2022-04-04 PROCEDURE — 36415 COLL VENOUS BLD VENIPUNCTURE: CPT

## 2022-04-04 PROCEDURE — 84443 ASSAY THYROID STIM HORMONE: CPT

## 2022-04-04 PROCEDURE — 84550 ASSAY OF BLOOD/URIC ACID: CPT

## 2022-04-04 PROCEDURE — 84436 ASSAY OF TOTAL THYROXINE: CPT

## 2022-04-04 PROCEDURE — 82607 VITAMIN B-12: CPT

## 2022-04-04 PROCEDURE — 85025 COMPLETE CBC W/AUTO DIFF WBC: CPT

## 2022-04-04 PROCEDURE — 80061 LIPID PANEL: CPT

## 2022-04-04 PROCEDURE — 83036 HEMOGLOBIN GLYCOSYLATED A1C: CPT

## 2022-05-03 ENCOUNTER — HOSPITAL ENCOUNTER (INPATIENT)
Age: 72
LOS: 3 days | Discharge: HOME | DRG: 309 | End: 2022-05-06
Attending: EMERGENCY MEDICINE | Admitting: INTERNAL MEDICINE
Payer: COMMERCIAL

## 2022-05-03 ENCOUNTER — APPOINTMENT (OUTPATIENT)
Dept: GENERAL RADIOLOGY | Age: 72
DRG: 309 | End: 2022-05-03
Payer: COMMERCIAL

## 2022-05-03 ENCOUNTER — APPOINTMENT (OUTPATIENT)
Dept: CT IMAGING | Age: 72
DRG: 309 | End: 2022-05-03
Payer: COMMERCIAL

## 2022-05-03 DIAGNOSIS — I47.20 V TACH: Primary | ICD-10-CM

## 2022-05-03 LAB
ANION GAP SERPL CALCULATED.3IONS-SCNC: 11 MMOL/L (ref 7–16)
BASOPHILS ABSOLUTE: 0.03 E9/L (ref 0–0.2)
BASOPHILS RELATIVE PERCENT: 0.6 % (ref 0–2)
BUN BLDV-MCNC: 18 MG/DL (ref 6–23)
CALCIUM SERPL-MCNC: 9.2 MG/DL (ref 8.6–10.2)
CHLORIDE BLD-SCNC: 103 MMOL/L (ref 98–107)
CO2: 29 MMOL/L (ref 22–29)
CREAT SERPL-MCNC: 0.6 MG/DL (ref 0.5–1)
EKG ATRIAL RATE: 65 BPM
EKG P AXIS: 65 DEGREES
EKG P-R INTERVAL: 192 MS
EKG Q-T INTERVAL: 392 MS
EKG QRS DURATION: 82 MS
EKG QTC CALCULATION (BAZETT): 407 MS
EKG R AXIS: 68 DEGREES
EKG T AXIS: 52 DEGREES
EKG VENTRICULAR RATE: 65 BPM
EOSINOPHILS ABSOLUTE: 0.1 E9/L (ref 0.05–0.5)
EOSINOPHILS RELATIVE PERCENT: 2.1 % (ref 0–6)
GFR AFRICAN AMERICAN: >60
GFR NON-AFRICAN AMERICAN: >60 ML/MIN/1.73
GLUCOSE BLD-MCNC: 121 MG/DL (ref 74–99)
HCT VFR BLD CALC: 46.3 % (ref 34–48)
HEMOGLOBIN: 15.3 G/DL (ref 11.5–15.5)
IMMATURE GRANULOCYTES #: 0.01 E9/L
IMMATURE GRANULOCYTES %: 0.2 % (ref 0–5)
LYMPHOCYTES ABSOLUTE: 1.01 E9/L (ref 1.5–4)
LYMPHOCYTES RELATIVE PERCENT: 21 % (ref 20–42)
MAGNESIUM: 2.1 MG/DL (ref 1.6–2.6)
MCH RBC QN AUTO: 33.3 PG (ref 26–35)
MCHC RBC AUTO-ENTMCNC: 33 % (ref 32–34.5)
MCV RBC AUTO: 100.7 FL (ref 80–99.9)
METER GLUCOSE: 108 MG/DL (ref 74–99)
MONOCYTES ABSOLUTE: 0.74 E9/L (ref 0.1–0.95)
MONOCYTES RELATIVE PERCENT: 15.4 % (ref 2–12)
NEUTROPHILS ABSOLUTE: 2.92 E9/L (ref 1.8–7.3)
NEUTROPHILS RELATIVE PERCENT: 60.7 % (ref 43–80)
PDW BLD-RTO: 12.2 FL (ref 11.5–15)
PLATELET # BLD: 360 E9/L (ref 130–450)
PMV BLD AUTO: 9.5 FL (ref 7–12)
POTASSIUM SERPL-SCNC: 4.1 MMOL/L (ref 3.5–5)
PRO-BNP: 234 PG/ML (ref 0–125)
RBC # BLD: 4.6 E12/L (ref 3.5–5.5)
SODIUM BLD-SCNC: 143 MMOL/L (ref 132–146)
T4 TOTAL: 10.8 MCG/DL (ref 4.5–11.7)
TOTAL CK: 445 U/L (ref 20–180)
TROPONIN, HIGH SENSITIVITY: 13 NG/L (ref 0–9)
TSH SERPL DL<=0.05 MIU/L-ACNC: 1.71 UIU/ML (ref 0.27–4.2)
WBC # BLD: 4.8 E9/L (ref 4.5–11.5)

## 2022-05-03 PROCEDURE — 82962 GLUCOSE BLOOD TEST: CPT

## 2022-05-03 PROCEDURE — 6360000002 HC RX W HCPCS: Performed by: INTERNAL MEDICINE

## 2022-05-03 PROCEDURE — 6360000002 HC RX W HCPCS: Performed by: EMERGENCY MEDICINE

## 2022-05-03 PROCEDURE — 96365 THER/PROPH/DIAG IV INF INIT: CPT

## 2022-05-03 PROCEDURE — 84443 ASSAY THYROID STIM HORMONE: CPT

## 2022-05-03 PROCEDURE — 6370000000 HC RX 637 (ALT 250 FOR IP): Performed by: INTERNAL MEDICINE

## 2022-05-03 PROCEDURE — 83735 ASSAY OF MAGNESIUM: CPT

## 2022-05-03 PROCEDURE — 71046 X-RAY EXAM CHEST 2 VIEWS: CPT

## 2022-05-03 PROCEDURE — 99223 1ST HOSP IP/OBS HIGH 75: CPT | Performed by: STUDENT IN AN ORGANIZED HEALTH CARE EDUCATION/TRAINING PROGRAM

## 2022-05-03 PROCEDURE — 83880 ASSAY OF NATRIURETIC PEPTIDE: CPT

## 2022-05-03 PROCEDURE — 93005 ELECTROCARDIOGRAM TRACING: CPT | Performed by: PHYSICIAN ASSISTANT

## 2022-05-03 PROCEDURE — 2140000000 HC CCU INTERMEDIATE R&B

## 2022-05-03 PROCEDURE — 93010 ELECTROCARDIOGRAM REPORT: CPT | Performed by: INTERNAL MEDICINE

## 2022-05-03 PROCEDURE — 99285 EMERGENCY DEPT VISIT HI MDM: CPT

## 2022-05-03 PROCEDURE — 96376 TX/PRO/DX INJ SAME DRUG ADON: CPT

## 2022-05-03 PROCEDURE — 82550 ASSAY OF CK (CPK): CPT

## 2022-05-03 PROCEDURE — 70450 CT HEAD/BRAIN W/O DYE: CPT

## 2022-05-03 PROCEDURE — 36415 COLL VENOUS BLD VENIPUNCTURE: CPT

## 2022-05-03 PROCEDURE — 93005 ELECTROCARDIOGRAM TRACING: CPT | Performed by: STUDENT IN AN ORGANIZED HEALTH CARE EDUCATION/TRAINING PROGRAM

## 2022-05-03 PROCEDURE — 80048 BASIC METABOLIC PNL TOTAL CA: CPT

## 2022-05-03 PROCEDURE — 84484 ASSAY OF TROPONIN QUANT: CPT

## 2022-05-03 PROCEDURE — 2580000003 HC RX 258: Performed by: EMERGENCY MEDICINE

## 2022-05-03 PROCEDURE — 84436 ASSAY OF TOTAL THYROXINE: CPT

## 2022-05-03 PROCEDURE — 85025 COMPLETE CBC W/AUTO DIFF WBC: CPT

## 2022-05-03 RX ORDER — ACETAMINOPHEN 325 MG/1
650 TABLET ORAL EVERY 6 HOURS PRN
Status: DISCONTINUED | OUTPATIENT
Start: 2022-05-03 | End: 2022-05-06 | Stop reason: HOSPADM

## 2022-05-03 RX ORDER — AZATHIOPRINE 50 MG/1
50 TABLET ORAL 2 TIMES DAILY
Status: DISCONTINUED | OUTPATIENT
Start: 2022-05-03 | End: 2022-05-06 | Stop reason: HOSPADM

## 2022-05-03 RX ORDER — LEVOTHYROXINE SODIUM 0.1 MG/1
150 TABLET ORAL WEEKLY
Status: ON HOLD | COMMUNITY
End: 2022-05-06 | Stop reason: HOSPADM

## 2022-05-03 RX ORDER — ENOXAPARIN SODIUM 100 MG/ML
40 INJECTION SUBCUTANEOUS DAILY
Status: DISCONTINUED | OUTPATIENT
Start: 2022-05-03 | End: 2022-05-06 | Stop reason: HOSPADM

## 2022-05-03 RX ORDER — CALCIUM CARBONATE 500(1250)
500 TABLET ORAL 2 TIMES DAILY
COMMUNITY

## 2022-05-03 RX ORDER — SODIUM CHLORIDE 9 MG/ML
INJECTION, SOLUTION INTRAVENOUS CONTINUOUS
Status: DISCONTINUED | OUTPATIENT
Start: 2022-05-03 | End: 2022-05-06 | Stop reason: HOSPADM

## 2022-05-03 RX ORDER — INSULIN GLARGINE-YFGN 100 [IU]/ML
15 INJECTION, SOLUTION SUBCUTANEOUS NIGHTLY
Status: DISCONTINUED | OUTPATIENT
Start: 2022-05-03 | End: 2022-05-06 | Stop reason: HOSPADM

## 2022-05-03 RX ORDER — 0.9 % SODIUM CHLORIDE 0.9 %
1000 INTRAVENOUS SOLUTION INTRAVENOUS ONCE
Status: COMPLETED | OUTPATIENT
Start: 2022-05-03 | End: 2022-05-03

## 2022-05-03 RX ORDER — PREDNISONE 1 MG/1
5 TABLET ORAL DAILY
Status: DISCONTINUED | OUTPATIENT
Start: 2022-05-04 | End: 2022-05-06 | Stop reason: HOSPADM

## 2022-05-03 RX ORDER — ROSUVASTATIN CALCIUM 5 MG/1
10 TABLET, COATED ORAL NIGHTLY
Status: DISCONTINUED | OUTPATIENT
Start: 2022-05-03 | End: 2022-05-06 | Stop reason: HOSPADM

## 2022-05-03 RX ORDER — LEVOTHYROXINE SODIUM 0.1 MG/1
100 TABLET ORAL DAILY
Status: DISCONTINUED | OUTPATIENT
Start: 2022-05-04 | End: 2022-05-06 | Stop reason: HOSPADM

## 2022-05-03 RX ORDER — ACEBUTOLOL HYDROCHLORIDE 200 MG/1
200 CAPSULE ORAL 2 TIMES DAILY
Status: DISCONTINUED | OUTPATIENT
Start: 2022-05-03 | End: 2022-05-03 | Stop reason: ALTCHOICE

## 2022-05-03 RX ORDER — PROPRANOLOL HYDROCHLORIDE 10 MG/1
10 TABLET ORAL 3 TIMES DAILY
Status: DISCONTINUED | OUTPATIENT
Start: 2022-05-03 | End: 2022-05-03

## 2022-05-03 RX ORDER — PANTOPRAZOLE SODIUM 40 MG/1
40 TABLET, DELAYED RELEASE ORAL DAILY
Status: DISCONTINUED | OUTPATIENT
Start: 2022-05-03 | End: 2022-05-06 | Stop reason: HOSPADM

## 2022-05-03 RX ORDER — AMIODARONE HYDROCHLORIDE 50 MG/ML
INJECTION, SOLUTION INTRAVENOUS
Status: DISPENSED
Start: 2022-05-03 | End: 2022-05-03

## 2022-05-03 RX ORDER — VITAMIN B COMPLEX
1000 TABLET ORAL DAILY
Status: DISCONTINUED | OUTPATIENT
Start: 2022-05-04 | End: 2022-05-06 | Stop reason: HOSPADM

## 2022-05-03 RX ORDER — METOPROLOL SUCCINATE 50 MG/1
50 TABLET, EXTENDED RELEASE ORAL DAILY
Status: DISCONTINUED | OUTPATIENT
Start: 2022-05-04 | End: 2022-05-06 | Stop reason: HOSPADM

## 2022-05-03 RX ORDER — METFORMIN HYDROCHLORIDE 500 MG/1
500 TABLET, EXTENDED RELEASE ORAL 2 TIMES DAILY
COMMUNITY

## 2022-05-03 RX ORDER — CALCIUM CARBONATE 500(1250)
500 TABLET ORAL 2 TIMES DAILY
Status: DISCONTINUED | OUTPATIENT
Start: 2022-05-03 | End: 2022-05-06 | Stop reason: HOSPADM

## 2022-05-03 RX ORDER — FAMOTIDINE 20 MG/1
20 TABLET, FILM COATED ORAL DAILY
Status: DISCONTINUED | OUTPATIENT
Start: 2022-05-03 | End: 2022-05-06 | Stop reason: HOSPADM

## 2022-05-03 RX ADMIN — PANTOPRAZOLE SODIUM 40 MG: 40 TABLET, DELAYED RELEASE ORAL at 22:13

## 2022-05-03 RX ADMIN — AMIODARONE HYDROCHLORIDE 0.5 MG/MIN: 50 INJECTION, SOLUTION INTRAVENOUS at 10:19

## 2022-05-03 RX ADMIN — CALCIUM 500 MG: 500 TABLET ORAL at 22:12

## 2022-05-03 RX ADMIN — SODIUM CHLORIDE: 9 INJECTION, SOLUTION INTRAVENOUS at 10:16

## 2022-05-03 RX ADMIN — AZATHIOPRINE 50 MG: 50 TABLET ORAL at 22:13

## 2022-05-03 RX ADMIN — AMIODARONE HYDROCHLORIDE 150 MG: 50 INJECTION, SOLUTION INTRAVENOUS at 09:56

## 2022-05-03 RX ADMIN — ROSUVASTATIN CALCIUM 10 MG: 5 TABLET, FILM COATED ORAL at 22:13

## 2022-05-03 RX ADMIN — ENOXAPARIN SODIUM 40 MG: 100 INJECTION SUBCUTANEOUS at 22:12

## 2022-05-03 RX ADMIN — FAMOTIDINE 20 MG: 20 TABLET ORAL at 22:13

## 2022-05-03 RX ADMIN — SODIUM CHLORIDE 1000 ML: 9 INJECTION, SOLUTION INTRAVENOUS at 09:48

## 2022-05-03 NOTE — Clinical Note
Discharge Plan[de-identified] Other/[facility])   Telemetry/Cardiac Monitoring Required?: Yes

## 2022-05-03 NOTE — ED PROVIDER NOTES
Department of Emergency Medicine   ED  Provider Note  Admit Date/RoomTime: 5/3/2022  7:50 AM  ED Room: 10/10          History of Present Illness:  5/3/22, Time: 11:06 AM EDT  Chief Complaint   Patient presents with    Dizziness     since this AM, hx PVCs, denies cp                Jarad Loya is a 80 y.o. female presenting to the ED for dizziness. Patient's been dizzy since this morning. Came on gradually, nothing makes it better or worse, no associated pain. She says she feels palpitations, feels lightheaded, feel that she does not pass out. Does resolve on its own. Does get chest heaviness with it. She does have history of PVCs. No other cardiac history. She denies any fever, chills, cough, sputum, change in bowel or bladder, lethargy, paresthesias, or any other symptoms or complaints. Review of Systems:   Pertinent positives and negatives are stated within HPI, all other systems reviewed and are negative.        --------------------------------------------- PAST HISTORY ---------------------------------------------  Past Medical History:  has a past medical history of Celiac disease, Palpitations, and Polymyositis ([facility] [address] Past Surgical History:  has a past surgical history that includes Hysterectomy (1992); lumbar laminectomy (1985, 1987); hernia repair; and Thyroidectomy, Completion. Social History:  reports that she quit smoking about 45 years ago. She has never used smokeless tobacco. She reports that she does not drink alcohol and does not use drugs. Family History: family history includes Cancer in her father and sister; Diabetes in her mother; Heart Disease in her mother. . Unless otherwise noted, family history is non contributory    The patients home medications have been reviewed.     Allergies: Sulfa antibiotics and Tetracyclines & related        ---------------------------------------------------PHYSICAL EXAM--------------------------------------    Constitutional/General: Alert and oriented x3  Head: Normocephalic and atraumatic  Eyes: PERRL, EOMI, sclera non icteric  Mouth: Oropharynx clear, handling secretions, no trismus, no asymmetry of the posterior oropharynx or uvular edema  Neck: Supple, full ROM, no stridor, no meningeal signs  Respiratory: Lungs clear to auscultation bilaterally, no wheezes, rales, or rhonchi. Not in respiratory distress  Cardiovascular:  Regular rate. Regular rhythm. 2+ distal pulses. Equal extremity pulses. Chest: No chest wall tenderness  GI:  Abdomen Soft, Non tender, Non distended. No rebound, guarding, or rigidity. No pulsatile masses. Musculoskeletal: Moves all extremities x 4. Warm and well perfused, no clubbing, cyanosis, or edema. Capillary refill <3 seconds  Integument: skin warm and dry. No rashes. Neurologic: GCS 15, no focal deficits, symmetric strength 5/5 in the upper and lower extremities bilaterally  Psychiatric: Normal Affect          -------------------------------------------------- RESULTS -------------------------------------------------  I have personally reviewed all laboratory and imaging results for this patient. Results are listed below.      LABS: (Lab results interpreted by me)  Results for orders placed or performed during the hospital encounter of 05/03/22   CBC with Auto Differential   Result Value Ref Range    WBC 4.8 4.5 - 11.5 E9/L    RBC 4.60 3.50 - 5.50 E12/L    Hemoglobin 15.3 11.5 - 15.5 g/dL    Hematocrit 46.3 34.0 - 48.0 %    .7 (H) 80.0 - 99.9 fL    MCH 33.3 26.0 - 35.0 pg    MCHC 33.0 32.0 - 34.5 %    RDW 12.2 11.5 - 15.0 fL    Platelets 628 152 - 778 E9/L    MPV 9.5 7.0 - 12.0 fL    Neutrophils % 60.7 43.0 - 80.0 %    Immature Granulocytes % 0.2 0.0 - 5.0 %    Lymphocytes % 21.0 20.0 - 42.0 %    Monocytes % 15.4 (H) 2.0 - 12.0 %    Eosinophils % 2.1 0.0 - 6.0 %    Basophils % 0.6 0.0 - 2.0 %    Neutrophils Absolute 2.92 1.80 - 7.30 E9/L    Immature Granulocytes # 0.01 E9/L    Lymphocytes Absolute 1.01 (L) 1.50 - 4.00 E9/L    Monocytes Absolute 0.74 0.10 - 0.95 E9/L    Eosinophils Absolute 0.10 0.05 - 0.50 E9/L    Basophils Absolute 0.03 0.00 - 0.20 G4/J   Basic Metabolic Panel   Result Value Ref Range    Sodium 143 132 - 146 mmol/L    Potassium 4.1 3.5 - 5.0 mmol/L    Chloride 103 98 - 107 mmol/L    CO2 29 22 - 29 mmol/L    Anion Gap 11 7 - 16 mmol/L    Glucose 121 (H) 74 - 99 mg/dL    BUN 18 6 - 23 mg/dL    CREATININE 0.6 0.5 - 1.0 mg/dL    GFR Non-African American >60 >=60 mL/min/1.73    GFR African American >60     Calcium 9.2 8.6 - 10.2 mg/dL   Troponin   Result Value Ref Range    Troponin, High Sensitivity 13 (H) 0 - 9 ng/L   Brain Natriuretic Peptide   Result Value Ref Range    Pro- (H) 0 - 125 pg/mL   Magnesium   Result Value Ref Range    Magnesium 2.1 1.6 - 2.6 mg/dL   TSH   Result Value Ref Range    TSH 1.710 0.270 - 4.200 uIU/mL   CK   Result Value Ref Range    Total  (H) 20 - 180 U/L   EKG 12 Lead   Result Value Ref Range    Ventricular Rate 65 BPM    Atrial Rate 65 BPM    P-R Interval 192 ms    QRS Duration 82 ms    Q-T Interval 392 ms    QTc Calculation (Bazett) 407 ms    P Axis 65 degrees    R Axis 68 degrees    T Axis 52 degrees   ,       RADIOLOGY:  Interpreted by Radiologist unless otherwise specified  CT HEAD WO CONTRAST   Final Result   No acute intracranial abnormality. No intracranial hemorrhage. XR CHEST (2 VW)   Final Result   Suspected obstructive airways disease.                EKG Interpretation  Interpreted by emergency department physician, Dr. Miriam Husain     Sinus, rate 65, no STEMI, no ischemic change       ------------------------- NURSING NOTES AND VITALS REVIEWED ---------------------------   The nursing notes within the ED encounter and vital signs as below have been reviewed by myself  /77   Pulse 63   Temp 98.4 °F (36.9 °C)   Resp 18   Ht 5' 8\" (1.727 m)   Wt 147 lb (66.7 kg)   SpO2 97%   BMI 22.35 kg/m²     Oxygen Saturation Interpretation: Normal    The patients available past medical records and past encounters were reviewed. ------------------------------ ED COURSE/MEDICAL DECISION MAKING----------------------  Medications   0.9 % sodium chloride bolus (1,000 mLs IntraVENous New Bag 5/3/22 0948)   0.9 % sodium chloride infusion ( IntraVENous New Bag 5/3/22 1016)   amiodarone (CORDARONE) 450 mg in dextrose 5 % 250 mL infusion (0.5 mg/min IntraVENous New Bag 5/3/22 1019)   amiodarone (CORDARONE) 150 MG/3ML injection (has no administration in time range)   propranolol (INDERAL) tablet 10 mg ( Oral Automatically Held 5/6/22 2100)   perflutren lipid microspheres (DEFINITY) injection 1.65 mg (has no administration in time range)   amiodarone (CORDARONE) 150 mg in dextrose 5 % 100 mL bolus (0 mg IntraVENous Stopped 5/3/22 1013)           The cardiac monitor revealed sinus with a heart rate in the 80s as interpreted by me. The cardiac monitor was ordered secondary to the patient's [facility] and to monitor the patient for dysrhythmia. CPT [id number]         Medical Decision Making:    Patient no symptoms or complaints acutely on arrival.  However, she did have several runs of V. tach. Became dizzy with this. She was given an amiodarone bolus, she is also started on a drip. Labs and imaging reviewed. EP consulted. Reevaluation, she is resting comfortably. No symptoms or complaints. Discussed with the hospitalist, patient will be admitted. Critical care time: 37 minutes      Counseling: The emergency provider has spoken with the patient and discussed todays results, in addition to providing specific details for the plan of care and counseling regarding the diagnosis and prognosis.   Questions are answered at this time and they are agreeable with the plan.       --------------------------------- IMPRESSION AND DISPOSITION ---------------------------------    IMPRESSION  1. V tach ([address]        DISPOSITION  Disposition: Admit to telemetry  Patient condition is stable        NOTE: This report was transcribed using voice recognition software.  Every effort was made to ensure accuracy; however, inadvertent computerized transcription errors may be present       Magan Healy MD  05/03/22 [id number]

## 2022-05-03 NOTE — PROGRESS NOTES
Patient seen and examined in the ED  Recurrent runs of symptomatic VT  RRR  Lungs clear  Abdomen soft without tenderness  No peripheral edema  EP to see

## 2022-05-03 NOTE — H&P
[address]                  [address] [facility],  [facility]                              HISTORY AND PHYSICAL    PATIENT NAME: Timoteo Edmondson                        :        1950  MED REC NO:   [id number]                            ROOM:       10  ACCOUNT NO:   [de-identified]                           ADMIT DATE: 2022  PROVIDER:     Prashant Crum DO    CHIEF COMPLAINT:  Palpitations and dizziness. HISTORY OF PRESENT ILLNESS:  The patient is a 72-year-old female with a  complicated past medical history including chronic atrial premature  contractions, polymyositis, insulin-requiring type 2 diabetes mellitus,  hypothyroidism, celiac disease, who describes onset of symptoms 2 days  prior to admission including intermittent and recurrent palpitations  associated with lightheadedness. No chest pains or dyspnea. The  symptoms worsened to the point where she was having nearly constant fast  irregular heartbeats prompting presentation to the emergency room. On  evaluation there, she was noted to be in ventricular tachycardia. MEDICATIONS:  See chart. PAST MEDICAL HISTORY:  Atrial premature contractions, polymyositis,  insulin-requiring type 2 diabetes mellitus, thyroidectomy with  hypothyroidism, celiac disease, hysterectomy, lumbar laminectomy, hernia  repair. SOCIAL HISTORY:  She is a retired registered nurse. No alcohol. No  tobacco.    FAMILY HISTORY:  Not available at this time. REVIEW OF SYSTEMS:  NEUROMUSCULAR:  Admits to lightheadedness last 2 days associated with  her fast irregular heartbeat. No headache. No diplopia. No facial  numbness. RESPIRATORY:  Denies shortness of breath, cough, fever or night sweats. CARDIOVASCULAR:  Palpitations. No chest pain. GASTROINTESTINAL:  No recent nausea, vomiting, diarrhea, abdominal pain,  melena, or hematochezia. GENITOURINARY:  Denies dysuria, hematuria, oliguria.   MUSCULOSKELETAL: Chronic polymyalgia. PSYCHIATRIC:  Denies. PHYSICAL EXAMINATION:  GENERAL:  The patient is a pleasant 67-year-old female who is alert and  oriented, in no acute distress. HEENT:  Head normal size and shape. Pupils equal and reactive. Sclerae  were clear. Extraocular movements were intact. Fundi were not  visualized. Tympanic membranes were not visualized. Oral mucous  membranes were slightly dry but clear. NECK:  Supple. Neck veins were flat. No jugular venous distention. No  carotid bruits. No palpable extrinsic neck masses or regional  lymphadenopathy. LUNGS:  Clear to auscultation bilaterally. CARDIOVASCULAR:  Regular rate and rhythm at the time of my exam.  ABDOMEN:  Soft and flat. No masses or tenderness. Bowel sounds intact. Femoral pulses symmetric. GENITOURINARY:  Deferred. RECTAL:  Deferred. EXTREMITIES:  No edema, no clubbing, no cyanosis. No asymmetry of lower  extremity circumference. NEUROLOGIC:  No focal neurologic deficits. CLINICAL IMPRESSION:  1. Ventricular tachycardia. 2.  Polymyositis. 3.  Insulin-requiring type 2 diabetes mellitus. 4.  Hypothyroidism. 5.  Celiac disease.         Sharron iLght DO    D: 05/03/2022 15:19:00       T: 05/03/2022 15:21:19     [email or web address]  Job#: [id number]     Doc#: [id number]    CC:

## 2022-05-03 NOTE — CONSULTS
[facility] CARDIOLOGY  CARDIAC ELECTROPHYSIOLOGY DEPARTMENT/DIVISION OF CARDIOLOGy  In patient consultation Report  PATIENT: [facility]  MEDICAL RECORD NUMBER: [id number]  DATE OF SERVICE:  5/3/2022  ATTENDING ELECTROPHYSIOLOGIST:  Armando Nevarez DO   REFERRING PHYSICIAN: No ref. provider found and Nicole Paez MD  CHIEF COMPLAINT: NSVT    HPI: Heath Christie is a 80 y.o. female with a history of PVCs, polymyositis, DM, hypothyroidism, celiac disease, and osteoporosis. She is managed by Dr. Amy Macario with at acebutolol 200 mg twice daily, Crestor 10 mg daily, and PPI. Her medications also include azathioprine and prednisone, which were prescribed for her polymyositis. In approximately 1980, patient reports she was diagnosed with PVCs, which were treated with acebutolol, which resolved symptoms of palpitations. In 2021, she establish cardiology care with Dr. Amy Macario. She complained of exertional chest pain at that time, so exercise nuclear stress test was performed. Stress test reported no ischemia and normal LVEF, but did note frequent PVCs during exertion (on review of telemetry strips, site of origin is suspected to be RCC). No changes in management were made at that time. On 5/3/2022, she presented with chief complaint of palpitations. On telemetry she was noted to have frequent PVCs as well as episodes of nonsustained VT, which was managed with initiation of amiodarone infusion. EP service was consulted by admitting physician for evaluation and management of PVCs/NSVT. On review of telemetry, episodes of NSVT have reduced since initiating amiodarone infusion. Patient denies any other complaints at this time.     Prior cardiac testing:  · Exercise nuclear stress test (2/2/2021): LVEF = 67%, normal perfusion with the exception of attenuation artifact due to soft tissue/breast attenuation at 85% of APMHR, perfusion, TID = 1.17, and increasing monomorphic PVCs with exertion and reduction in PVC burden with recovery, and average exercise capacity.     Past Medical History:   Diagnosis Date    Celiac disease     Palpitations     Polymyositis ([address]      Past Surgical History:   Procedure Laterality Date    HERNIA REPAIR      HYSTERECTOMY  1992    LUMBAR LAMINECTOMY  ,     THYROIDECTOMY, COMPLETION        Family History   Problem Relation Age of Onset    Diabetes Mother     Heart Disease Mother     Cancer Father     Cancer Sister      There is no family history of sudden cardiac arrest    Social History     Tobacco Use    Smoking status: Former Smoker     Quit date: 3/15/1977     Years since quittin.1    Smokeless tobacco: Never Used   Substance Use Topics    Alcohol use: No       Current Facility-Administered Medications   Medication Dose Route Frequency Provider Last Rate Last Admin    0.9 % sodium chloride infusion   IntraVENous Continuous Tobi Chicas  mL/hr at 22 1016 New Bag at 22 1016    amiodarone (CORDARONE) 450 mg in dextrose 5 % 250 mL infusion  0.5 mg/min IntraVENous Continuous Tobi Chicas MD 16.7 mL/hr at 22 1019 0.5 mg/min at 22 1019    amiodarone (CORDARONE) 150 MG/3ML injection             [Held by provider] propranolol (INDERAL) tablet 10 mg  10 mg Oral TID Arely Place, APRN - CNP        perflutren lipid microspheres (DEFINITY) injection 1.65 mg  1.5 mL IntraVENous ONCE PRN Arely Place, APRN - CNP         Current Outpatient Medications   Medication Sig Dispense Refill    calcium carbonate (OSCAL) 500 MG TABS tablet Take 500 mg by mouth 2 times daily      levothyroxine (SYNTHROID) 100 MCG tablet Take 150 mcg by mouth once a week Given       metFORMIN (GLUCOPHAGE-XR) 500 MG extended release tablet Take 500 mg by mouth 2 times daily      LANTUS SOLOSTAR 100 UNIT/ML injection pen Inject 15 Units into the skin nightly       rosuvastatin (CRESTOR) 10 MG tablet Take 10 mg by mouth nightly  predniSONE (DELTASONE) 5 MG tablet Take 5 mg by mouth daily       azaTHIOprine (IMURAN) 50 MG tablet Take 50 mg by mouth 2 times daily      acebutolol (SECTRAL) 200 MG capsule Take 200 mg by mouth 2 times daily       levothyroxine (SYNTHROID) 100 MCG tablet Take 100 mcg by mouth Six times weekly Given Monday,Tuesday,Wednesday,Thursday,Friday,Saturday      famotidine (PEPCID) 20 MG tablet Take 20 mg by mouth daily       pantoprazole (PROTONIX) 40 MG tablet Take 40 mg by mouth daily      vitamin D (CHOLECALCIFEROL) 1000 UNITS TABS tablet Take 1,000 Units by mouth daily         Allergies   Allergen Reactions    Sulfa Antibiotics Hives    Tetracyclines & Related Hives     ROS:   Constitutional: Negative for fever, activity change and appetite change. HENT: Negative for epistaxis. Eyes: Negative for diploplia, blurred vision. Respiratory: Negative for cough, chest tightness, shortness of breath and wheezing. Cardiovascular: pertinent positives in HPI  Gastrointestinal: Negative for abdominal pain and blood in stool. Genitourinary: Negative for hematuria and difficulty urinating. Musculoskeletal: Negative for myalgias and gait problem. Skin: Negative for color change and rash. Neurological: Negative for syncope and light-headedness. Psychiatric/Behavioral: Negative for confusion and agitation. The patient is not nervous/anxious. Heme: no bleeding disorders, no melena or hematochezia  All other review of systems are negative     PHYSICAL EXAM:  /62   Pulse 66   Temp 98.4 °F (36.9 °C)   Resp 18   Ht 5' 8\" (1.727 m)   Wt 147 lb (66.7 kg)   SpO2 97%   BMI 22.35 kg/m²    Constitutional: Well-developed, no acute distress, well groomed  Eyes: conjunctivae normal, no xanthelasma   Ears, Nose, Throat: oral mucosa moist, no cyanosis   Neck: supple, no JVD, no bruits, no thyromegaly   CV: normal rate, regular rhythm,  no murmurs, rubs, or gallops.  PMI is nondisplaced, Peripheral pulses normal including bilateral radial and pedal pulses are normal in quality  Lungs: clear to auscultation bilaterally, normal respiratory effort without used of accessory muscles, no wheezes  Abdomen: soft, non-tender, bowel sounds present, no masses or hepatomegaly   Extremities: no digital clubbing, no edema   Skin: warm, no rashes   Neuro/Psych: A&O x 3, normal mood and affect    Data:    Recent Labs     05/03/22 0815   WBC 4.8   HGB 15.3   HCT 46.3        Recent Labs     05/03/22 0815      K 4.1      CO2 29   BUN 18   CREATININE 0.6   CALCIUM 9.2     No results for input(s): INR in the last 72 hours. Recent Labs     05/03/22 0815   TSH 1.710     Lab Results   Component Value Date    MG 2.1 05/03/2022     Telemetry: Sinus rhythm with frequent unifocal PVCs and episodes of NSVT     Assessment/plan:  1. PVCs/NSVT  - Nuclear stress test in 2021 was reportedly low risk. Frequent PVCs noted during exertion, which appear to be RCC origin based on morphology on twelve-lead ECG. - Historically managed with acebutolol.  - She reports increase in PVC symptoms.  -Maintain potassium between 4 and 5 and magnesium between 2 and 3.  - I discussed option of ablation with patient. However, she does not appear to be interested at this time. - Change acebutlol to metoprolol XL 50 mg daily  - Amiodarone infusion initiated by other providers. We will continue for the time being. However, likely will change to flecainide versus other class III agent prior to discharge.  - Recommend cardiac MRI. - Continue to monitor on telemetry. I have spent a total of 30 minutes with the patient and his/her family reviewing the above stated recommendations. And a total of >50% of that time involved face-to-face time providing counseling and or coordination of care with the other providers. Thank you for allowing me to participate in your patient's care. Please call me if there are any questions.       Jeff Bellamy Christophe Nissen, [address] [facility] Electrophysiology  [address] [facility]

## 2022-05-03 NOTE — ED NOTES
FIRST PROVIDER CONTACT ASSESSMENT NOTE           Department of Emergency Medicine                 First Provider Note            5/3/22  8:06 AM EDT    Date of Encounter: 5/3/2022  7:50 AM    Patient Name: Renan Lion  : 1950  MRN: [id number]    Chief Complaint: Dizziness (since this AM, hx PVCs, denies cp)      History of Present Illness: Renan Lion is a 80 y.o. female who presents to the ED for dizziness, chest pressure. Patient states she has a history of PVCs. States that she has had frequent feelings of PVCs, chest pressure and dizziness. Having it every 10-15 minutes. Denies syncope. Focused Physical Exam:  VS:    ED Triage Vitals   BP Temp Temp src Pulse Resp SpO2 Height Weight   22 0745 22 0745 -- 22 0730 22 0745 22 0730 22 0745 22 0745   (!) 160/85 98.4 °F (36.9 °C)  68 17 97 % 5' 8\" (1.727 m) 147 lb (66.7 kg)        Physical Ex: Constitutional: Alert and non-toxic. Medical History:  has a past medical history of Celiac disease, Palpitations, and Polymyositis ([address] Surgical History:  has a past surgical history that includes Hysterectomy (); lumbar laminectomy (, ); hernia repair; and Thyroidectomy, Completion. Social History:  reports that she quit smoking about 45 years ago. She has never used smokeless tobacco. She reports that she does not drink alcohol and does not use drugs. Family History: family history includes Cancer in her father and sister; Diabetes in her mother; Heart Disease in her mother.     Allergies: Sulfa antibiotics and Tetracyclines & related     Initial Plan of Care: Initiate Treatment-Testing, Proceed toTreatment Area When Bed Available for ED Attending/MLP to Continue Care      ---END OF FIRST PROVIDER CONTACT ASSESSMENT NOTE---  Electronically signed by DOMINICK Sexton   DD: 5/3/22       Jayjay Sexton  [phone]

## 2022-05-04 ENCOUNTER — APPOINTMENT (OUTPATIENT)
Dept: MRI IMAGING | Age: 72
DRG: 309 | End: 2022-05-04
Payer: COMMERCIAL

## 2022-05-04 PROBLEM — I47.29 NSVT (NONSUSTAINED VENTRICULAR TACHYCARDIA) (HCC): Status: ACTIVE | Noted: 2022-05-03

## 2022-05-04 LAB
ALBUMIN SERPL-MCNC: 4 G/DL (ref 3.5–5.2)
ALP BLD-CCNC: 50 U/L (ref 35–104)
ALT SERPL-CCNC: 22 U/L (ref 0–32)
ANION GAP SERPL CALCULATED.3IONS-SCNC: 13 MMOL/L (ref 7–16)
AST SERPL-CCNC: 29 U/L (ref 0–31)
BILIRUB SERPL-MCNC: 1 MG/DL (ref 0–1.2)
BUN BLDV-MCNC: 12 MG/DL (ref 6–23)
CALCIUM SERPL-MCNC: 9.1 MG/DL (ref 8.6–10.2)
CHLORIDE BLD-SCNC: 101 MMOL/L (ref 98–107)
CO2: 27 MMOL/L (ref 22–29)
CREAT SERPL-MCNC: 0.6 MG/DL (ref 0.5–1)
GFR AFRICAN AMERICAN: >60
GFR NON-AFRICAN AMERICAN: >60 ML/MIN/1.73
GLUCOSE BLD-MCNC: 105 MG/DL (ref 74–99)
HBA1C MFR BLD: 6.4 % (ref 4–5.6)
HCT VFR BLD CALC: 44 % (ref 34–48)
HEMOGLOBIN: 14.9 G/DL (ref 11.5–15.5)
LV EF: 51 %
LVEF MODALITY: NORMAL
MCH RBC QN AUTO: 33.4 PG (ref 26–35)
MCHC RBC AUTO-ENTMCNC: 33.9 % (ref 32–34.5)
MCV RBC AUTO: 98.7 FL (ref 80–99.9)
METER GLUCOSE: 115 MG/DL (ref 74–99)
METER GLUCOSE: 198 MG/DL (ref 74–99)
PDW BLD-RTO: 12.2 FL (ref 11.5–15)
PLATELET # BLD: 357 E9/L (ref 130–450)
PMV BLD AUTO: 9.9 FL (ref 7–12)
POTASSIUM SERPL-SCNC: 3.8 MMOL/L (ref 3.5–5)
RBC # BLD: 4.46 E12/L (ref 3.5–5.5)
SODIUM BLD-SCNC: 141 MMOL/L (ref 132–146)
TOTAL PROTEIN: 6.9 G/DL (ref 6.4–8.3)
WBC # BLD: 6.2 E9/L (ref 4.5–11.5)

## 2022-05-04 PROCEDURE — 80053 COMPREHEN METABOLIC PANEL: CPT

## 2022-05-04 PROCEDURE — 6360000002 HC RX W HCPCS: Performed by: EMERGENCY MEDICINE

## 2022-05-04 PROCEDURE — 99232 SBSQ HOSP IP/OBS MODERATE 35: CPT | Performed by: NURSE PRACTITIONER

## 2022-05-04 PROCEDURE — 36415 COLL VENOUS BLD VENIPUNCTURE: CPT

## 2022-05-04 PROCEDURE — S5553 INSULIN LONG ACTING 5 U: HCPCS | Performed by: INTERNAL MEDICINE

## 2022-05-04 PROCEDURE — 6360000002 HC RX W HCPCS: Performed by: INTERNAL MEDICINE

## 2022-05-04 PROCEDURE — 75561 CARDIAC MRI FOR MORPH W/DYE: CPT | Performed by: INTERNAL MEDICINE

## 2022-05-04 PROCEDURE — 85027 COMPLETE CBC AUTOMATED: CPT

## 2022-05-04 PROCEDURE — 2140000000 HC CCU INTERMEDIATE R&B

## 2022-05-04 PROCEDURE — 75561 CARDIAC MRI FOR MORPH W/DYE: CPT

## 2022-05-04 PROCEDURE — 82962 GLUCOSE BLOOD TEST: CPT

## 2022-05-04 PROCEDURE — 6360000004 HC RX CONTRAST MEDICATION: Performed by: RADIOLOGY

## 2022-05-04 PROCEDURE — 6370000000 HC RX 637 (ALT 250 FOR IP): Performed by: INTERNAL MEDICINE

## 2022-05-04 PROCEDURE — 6370000000 HC RX 637 (ALT 250 FOR IP): Performed by: NURSE PRACTITIONER

## 2022-05-04 PROCEDURE — A9579 GAD-BASE MR CONTRAST NOS,1ML: HCPCS | Performed by: RADIOLOGY

## 2022-05-04 PROCEDURE — 6370000000 HC RX 637 (ALT 250 FOR IP): Performed by: STUDENT IN AN ORGANIZED HEALTH CARE EDUCATION/TRAINING PROGRAM

## 2022-05-04 PROCEDURE — 2580000003 HC RX 258: Performed by: EMERGENCY MEDICINE

## 2022-05-04 PROCEDURE — 83036 HEMOGLOBIN GLYCOSYLATED A1C: CPT

## 2022-05-04 RX ORDER — LORAZEPAM 0.5 MG/1
0.5 TABLET ORAL ONCE
Status: COMPLETED | OUTPATIENT
Start: 2022-05-04 | End: 2022-05-04

## 2022-05-04 RX ADMIN — AZATHIOPRINE 50 MG: 50 TABLET ORAL at 09:29

## 2022-05-04 RX ADMIN — FAMOTIDINE 20 MG: 20 TABLET ORAL at 09:28

## 2022-05-04 RX ADMIN — ENOXAPARIN SODIUM 40 MG: 100 INJECTION SUBCUTANEOUS at 09:28

## 2022-05-04 RX ADMIN — LEVOTHYROXINE SODIUM 100 MCG: 0.1 TABLET ORAL at 05:35

## 2022-05-04 RX ADMIN — PANTOPRAZOLE SODIUM 40 MG: 40 TABLET, DELAYED RELEASE ORAL at 09:29

## 2022-05-04 RX ADMIN — ROSUVASTATIN CALCIUM 10 MG: 5 TABLET, FILM COATED ORAL at 22:14

## 2022-05-04 RX ADMIN — INSULIN GLARGINE-YFGN 15 UNITS: 100 INJECTION, SOLUTION SUBCUTANEOUS at 21:00

## 2022-05-04 RX ADMIN — Medication 1000 UNITS: at 09:28

## 2022-05-04 RX ADMIN — GADOTERIDOL 30 ML: 279.3 INJECTION, SOLUTION INTRAVENOUS at 14:43

## 2022-05-04 RX ADMIN — METOPROLOL SUCCINATE 50 MG: 50 TABLET, EXTENDED RELEASE ORAL at 09:28

## 2022-05-04 RX ADMIN — AZATHIOPRINE 50 MG: 50 TABLET ORAL at 22:15

## 2022-05-04 RX ADMIN — LORAZEPAM 0.5 MG: 0.5 TABLET ORAL at 12:18

## 2022-05-04 RX ADMIN — CALCIUM 500 MG: 500 TABLET ORAL at 09:28

## 2022-05-04 RX ADMIN — PREDNISONE 5 MG: 5 TABLET ORAL at 09:28

## 2022-05-04 RX ADMIN — AMIODARONE HYDROCHLORIDE 0.5 MG/MIN: 50 INJECTION, SOLUTION INTRAVENOUS at 00:35

## 2022-05-04 RX ADMIN — AMIODARONE HYDROCHLORIDE 0.5 MG/MIN: 50 INJECTION, SOLUTION INTRAVENOUS at 17:41

## 2022-05-04 RX ADMIN — CALCIUM 500 MG: 500 TABLET ORAL at 22:15

## 2022-05-04 ASSESSMENT — PAIN SCALES - GENERAL
PAINLEVEL_OUTOF10: 7
PAINLEVEL_OUTOF10: 0

## 2022-05-04 ASSESSMENT — PAIN DESCRIPTION - LOCATION: LOCATION: HEAD

## 2022-05-04 ASSESSMENT — PAIN DESCRIPTION - PAIN TYPE: TYPE: ACUTE PAIN

## 2022-05-04 ASSESSMENT — PAIN DESCRIPTION - DESCRIPTORS: DESCRIPTORS: ACHING

## 2022-05-04 ASSESSMENT — PAIN DESCRIPTION - ORIENTATION: ORIENTATION: ANTERIOR

## 2022-05-04 NOTE — CARE COORDINATION
Patient presented to the ED for dizziness and chest pressure; has a hx of PVCs. Patient admitted for V tach. Met with patient and daughter at bedside for transition of care planning. Patient lives in a home with her , is independent without a device and her daughter lives down the street. Uses [facility] for mail order meds or [employer] [facility] pharmacy. PCP is Dr. Wali Garrison. Plan is home, no needs and  to transport. Patient for cardiac MRI today.     Ashu Espinosa, MSW, LSW [phone]

## 2022-05-04 NOTE — PROGRESS NOTES
[address] ELECTROPHYSIOLOGY DEPARTMENT/DIVISION OF CARDIOLOGy  In patient Progress Report  PATIENT: Lucas Hunt  MEDICAL RECORD NUMBER: [id number]  DATE OF SERVICE:  5/4/2022  ATTENDING ELECTROPHYSIOLOGIST:  Porfirio Fishman DO   REFERRING PHYSICIAN:   Chase Salas MD  CHIEF COMPLAINT: NSVT    HPI: Misbah Sigala is a 80 y.o. female with a history of PVCs, polymyositis, DM, hypothyroidism, celiac disease, and osteoporosis. She is managed by Dr. Soto Veronica with at acebutolol 200 mg twice daily, Crestor 10 mg daily, and PPI. Her medications also include azathioprine and prednisone, which were prescribed for her polymyositis. In approximately 1980, patient reports she was diagnosed with PVCs, which were treated with acebutolol, which resolved symptoms of palpitations. In 2021, she establish cardiology care with Dr. Soto Veronica. She complained of exertional chest pain at that time, so exercise nuclear stress test was performed. Stress test reported no ischemia and normal LVEF, but did note frequent PVCs during exertion (on review of telemetry strips, site of origin is suspected to be RCC). No changes in management were made at that time. On 5/3/2022, she presented with chief complaint of palpitations. On telemetry she was noted to have frequent PVCs as well as episodes of nonsustained VT, which was managed with initiation of amiodarone infusion. EP service was consulted by admitting physician for evaluation and management of PVCs/NSVT. A 12 lead showed simlar morphology of NSVT to PVCs,  On review of telemetry, episodes of NSVT have reduced since initiating amiodarone infusion. Patient denies any other complaints at this time. Await MRI at this time to be completed today.      Prior cardiac testing:  · Exercise nuclear stress test (2/2/2021): LVEF = 67%, normal perfusion with the exception of attenuation artifact due to soft tissue/breast attenuation at 85% of APMHR, perfusion, TID = 1.17, and increasing monomorphic PVCs with exertion and reduction in PVC burden with recovery, and average exercise capacity.     Past Medical History:   Diagnosis Date    Celiac disease     Palpitations     Polymyositis ([address]      Past Surgical History:   Procedure Laterality Date    HERNIA REPAIR      HYSTERECTOMY  1992    LUMBAR LAMINECTOMY  ,     THYROIDECTOMY, COMPLETION        Family History   Problem Relation Age of Onset    Diabetes Mother     Heart Disease Mother     Cancer Father     Cancer Sister      There is no family history of sudden cardiac arrest    Social History     Tobacco Use    Smoking status: Former Smoker     Quit date: 3/15/1977     Years since quittin.1    Smokeless tobacco: Never Used   Substance Use Topics    Alcohol use: No       Current Facility-Administered Medications   Medication Dose Route Frequency Provider Last Rate Last Admin    gadoteridol (PROHANCE) injection 30 mL  30 mL IntraVENous ONCE PRN Keyon Smith,         0.9 % sodium chloride infusion   IntraVENous Continuous Rod Borja  mL/hr at 22 1016 New Bag at 22 1016    amiodarone (CORDARONE) 450 mg in dextrose 5 % 250 mL infusion  0.5 mg/min IntraVENous Continuous Rod Borja MD 16.7 mL/hr at 22 0035 0.5 mg/min at 22 0035    perflutren lipid microspheres (DEFINITY) injection 1.65 mg  1.5 mL IntraVENous ONCE PRN GENIA Rangel - CNP        azaTHIOprine [facility]) tablet 50 mg  50 mg Oral BID Jose Montes MD   50 mg at 22 4841    calcium elemental (OSCAL) tablet 500 mg  500 mg Oral BID Jose Montes MD   500 mg at 22 9558    famotidine (PEPCID) tablet 20 mg  20 mg Oral Daily Jose Montes MD   20 mg at 22 0928    insulin glargine-yfgn (SEMGLEE-YFGN) injection vial 15 Units  15 Units SubCUTAneous Nightly Jose Montes MD        levothyroxine (SYNTHROID) tablet 100 mcg  100 mcg Oral Daily [facility] Marcelino Saravia MD   100 mcg at 05/04/22 0535    pantoprazole (PROTONIX) tablet 40 mg  40 mg Oral Daily Timi Whiteside MD   40 mg at 05/04/22 4441    predniSONE (DELTASONE) tablet 5 mg  5 mg Oral Daily Timi Wihteside MD   5 mg at 05/04/22 3974    rosuvastatin (CRESTOR) tablet 10 mg  10 mg Oral Nightly Timi Whiteside MD   10 mg at 05/03/22 2213    vitamin D (CHOLECALCIFEROL) tablet 1,000 Units  1,000 Units Oral Daily Timi Whiteside MD   1,000 Units at 05/04/22 0928    enoxaparin (LOVENOX) injection 40 mg  40 mg SubCUTAneous Daily Timi Whiteside MD   40 mg at 05/04/22 6412    acetaminophen (TYLENOL) tablet 650 mg  650 mg Oral Q6H PRN Timi Whiteside MD        metoprolol succinate (TOPROL XL) extended release tablet 50 mg  50 mg Oral Daily [facility]    50 mg at 05/04/22 3420      Allergies   Allergen Reactions    Sulfa Antibiotics Hives    Tetracyclines & Related Hives     ROS:   Constitutional: Negative for fever, activity change and appetite change. HENT: Negative for epistaxis. Eyes: Negative for diploplia, blurred vision. Respiratory: Negative for cough, chest tightness, shortness of breath and wheezing. Cardiovascular: pertinent positives in HPI  Gastrointestinal: Negative for abdominal pain and blood in stool. Genitourinary: Negative for hematuria and difficulty urinating. Musculoskeletal: Negative for myalgias and gait problem. Skin: Negative for color change and rash. Neurological: Negative for syncope and light-headedness. Psychiatric/Behavioral: Negative for confusion and agitation. The patient is not nervous/anxious.   Heme: no bleeding disorders, no melena or hematochezia  All other review of systems are negative     PHYSICAL EXAM:  /62   Pulse 76   Temp 97.5 °F (36.4 °C) (Temporal)   Resp 14   Ht 5' 8\" (1.727 m)   Wt 147 lb (66.7 kg)   SpO2 99%   BMI 22.35 kg/m²    Constitutional: Well-developed, no acute distress, well groomed examined in room with family at bedside. Eyes: conjunctivae normal, no xanthelasma   Ears, Nose, Throat: oral mucosa moist, no cyanosis   Neck: supple, no JVD, no bruits, no thyromegaly   CV: normal rate, regular rhythm,  no murmurs, rubs, or gallops. PMI is nondisplaced, Peripheral pulses normal including bilateral radial and pedal pulses are normal in quality  Lungs: clear to auscultation bilaterally, normal respiratory effort without used of accessory muscles, no wheezes  Abdomen: soft, non-tender, bowel sounds present, no masses or hepatomegaly   Extremities: no digital clubbing, no edema   Skin: warm, no rashes   Neuro/Psych: A&O x 3, normal mood and affect    Data:    Recent Labs     05/03/22  0815 05/04/22 0457   WBC 4.8 6.2   HGB 15.3 14.9   HCT 46.3 44.0    357     Recent Labs     05/03/22  0815 05/04/22 0457    141   K 4.1 3.8    101   CO2 29 27   BUN 18 12   CREATININE 0.6 0.6   CALCIUM 9.2 9.1     No results for input(s): INR in the last 72 hours. Recent Labs     05/03/22  0815   TSH 1.710     Lab Results   Component Value Date    MG 2.1 05/03/2022     Telemetry: Sinus rhythm with frequent unifocal PVCs and episodes of NSVT decreased compared to 05/03     Assessment/plan:  1. PVCs/NSVT  - Nuclear stress test in 2021 was reportedly low risk. Frequent PVCs noted during exertion, which appear to be RCC origin based on morphology on twelve-lead ECG. - Historically managed with acebutolol.  - She reports increase in PVC symptoms.  -Maintain potassium between 4 and 5 and magnesium between 2 and 3.  - I discussed option of ablation with patient. However, she does not appear to be interested at this time. - Change acebutlol to metoprolol XL 50 mg daily  - Amiodarone infusion initiated by other providers. We will continue for the time being. However, likely will change to flecainide versus other class III agent prior to discharge.  - Recommend cardiac MRI.   - Continue to monitor on telemetry. - Continue IV amiodarone for 24 more hours 05/05/22 once cardiac MRI is complete will change to PO or change agents. Thank you for allowing me to participate in your patient's care. Please call me if there are any questions.       GENIA Arnold - CNP   Cardiac Electrophysiology  [address] [place] [address]

## 2022-05-05 LAB
ALBUMIN SERPL-MCNC: 4 G/DL (ref 3.5–5.2)
ALP BLD-CCNC: 56 U/L (ref 35–104)
ALT SERPL-CCNC: 18 U/L (ref 0–32)
ANION GAP SERPL CALCULATED.3IONS-SCNC: 13 MMOL/L (ref 7–16)
AST SERPL-CCNC: 22 U/L (ref 0–31)
BILIRUB SERPL-MCNC: 0.5 MG/DL (ref 0–1.2)
BUN BLDV-MCNC: 18 MG/DL (ref 6–23)
CALCIUM SERPL-MCNC: 9.4 MG/DL (ref 8.6–10.2)
CHLORIDE BLD-SCNC: 101 MMOL/L (ref 98–107)
CO2: 25 MMOL/L (ref 22–29)
CREAT SERPL-MCNC: 0.7 MG/DL (ref 0.5–1)
EKG ATRIAL RATE: 57 BPM
EKG ATRIAL RATE: 73 BPM
EKG P AXIS: 36 DEGREES
EKG P AXIS: 52 DEGREES
EKG P-R INTERVAL: 162 MS
EKG P-R INTERVAL: 182 MS
EKG Q-T INTERVAL: 414 MS
EKG Q-T INTERVAL: 420 MS
EKG QRS DURATION: 82 MS
EKG QRS DURATION: 88 MS
EKG QTC CALCULATION (BAZETT): 446 MS
EKG QTC CALCULATION (BAZETT): 456 MS
EKG R AXIS: 3 DEGREES
EKG R AXIS: 32 DEGREES
EKG T AXIS: 11 DEGREES
EKG T AXIS: 26 DEGREES
EKG VENTRICULAR RATE: 68 BPM
EKG VENTRICULAR RATE: 73 BPM
GFR AFRICAN AMERICAN: >60
GFR NON-AFRICAN AMERICAN: >60 ML/MIN/1.73
GLUCOSE BLD-MCNC: 136 MG/DL (ref 74–99)
HCT VFR BLD CALC: 42.1 % (ref 34–48)
HEMOGLOBIN: 14.5 G/DL (ref 11.5–15.5)
MCH RBC QN AUTO: 33.5 PG (ref 26–35)
MCHC RBC AUTO-ENTMCNC: 34.4 % (ref 32–34.5)
MCV RBC AUTO: 97.2 FL (ref 80–99.9)
METER GLUCOSE: 130 MG/DL (ref 74–99)
PDW BLD-RTO: 12.1 FL (ref 11.5–15)
PLATELET # BLD: 334 E9/L (ref 130–450)
PMV BLD AUTO: 9.7 FL (ref 7–12)
POTASSIUM SERPL-SCNC: 4 MMOL/L (ref 3.5–5)
RBC # BLD: 4.33 E12/L (ref 3.5–5.5)
SODIUM BLD-SCNC: 139 MMOL/L (ref 132–146)
TOTAL PROTEIN: 6.7 G/DL (ref 6.4–8.3)
WBC # BLD: 7.1 E9/L (ref 4.5–11.5)

## 2022-05-05 PROCEDURE — 6370000000 HC RX 637 (ALT 250 FOR IP): Performed by: INTERNAL MEDICINE

## 2022-05-05 PROCEDURE — 2140000000 HC CCU INTERMEDIATE R&B

## 2022-05-05 PROCEDURE — 82962 GLUCOSE BLOOD TEST: CPT

## 2022-05-05 PROCEDURE — 6360000002 HC RX W HCPCS: Performed by: INTERNAL MEDICINE

## 2022-05-05 PROCEDURE — 6370000000 HC RX 637 (ALT 250 FOR IP): Performed by: NURSE PRACTITIONER

## 2022-05-05 PROCEDURE — 80053 COMPREHEN METABOLIC PANEL: CPT

## 2022-05-05 PROCEDURE — 85027 COMPLETE CBC AUTOMATED: CPT

## 2022-05-05 PROCEDURE — 93010 ELECTROCARDIOGRAM REPORT: CPT | Performed by: INTERNAL MEDICINE

## 2022-05-05 PROCEDURE — 93005 ELECTROCARDIOGRAM TRACING: CPT | Performed by: NURSE PRACTITIONER

## 2022-05-05 PROCEDURE — 6370000000 HC RX 637 (ALT 250 FOR IP): Performed by: STUDENT IN AN ORGANIZED HEALTH CARE EDUCATION/TRAINING PROGRAM

## 2022-05-05 PROCEDURE — 36415 COLL VENOUS BLD VENIPUNCTURE: CPT

## 2022-05-05 PROCEDURE — S5553 INSULIN LONG ACTING 5 U: HCPCS | Performed by: INTERNAL MEDICINE

## 2022-05-05 RX ORDER — FLECAINIDE ACETATE 100 MG/1
100 TABLET ORAL EVERY 12 HOURS SCHEDULED
Status: DISCONTINUED | OUTPATIENT
Start: 2022-05-05 | End: 2022-05-06 | Stop reason: HOSPADM

## 2022-05-05 RX ADMIN — LEVOTHYROXINE SODIUM 100 MCG: 0.1 TABLET ORAL at 06:10

## 2022-05-05 RX ADMIN — FLECAINIDE ACETATE 100 MG: 100 TABLET ORAL at 20:51

## 2022-05-05 RX ADMIN — CALCIUM 500 MG: 500 TABLET ORAL at 09:11

## 2022-05-05 RX ADMIN — PANTOPRAZOLE SODIUM 40 MG: 40 TABLET, DELAYED RELEASE ORAL at 09:11

## 2022-05-05 RX ADMIN — ROSUVASTATIN CALCIUM 10 MG: 5 TABLET, FILM COATED ORAL at 20:51

## 2022-05-05 RX ADMIN — METOPROLOL SUCCINATE 50 MG: 50 TABLET, EXTENDED RELEASE ORAL at 09:11

## 2022-05-05 RX ADMIN — CALCIUM 500 MG: 500 TABLET ORAL at 20:50

## 2022-05-05 RX ADMIN — INSULIN GLARGINE-YFGN 15 UNITS: 100 INJECTION, SOLUTION SUBCUTANEOUS at 21:00

## 2022-05-05 RX ADMIN — PREDNISONE 5 MG: 5 TABLET ORAL at 09:11

## 2022-05-05 RX ADMIN — AZATHIOPRINE 50 MG: 50 TABLET ORAL at 09:11

## 2022-05-05 RX ADMIN — AZATHIOPRINE 50 MG: 50 TABLET ORAL at 20:51

## 2022-05-05 RX ADMIN — Medication 1000 UNITS: at 09:11

## 2022-05-05 RX ADMIN — FAMOTIDINE 20 MG: 20 TABLET ORAL at 09:11

## 2022-05-05 RX ADMIN — ENOXAPARIN SODIUM 40 MG: 100 INJECTION SUBCUTANEOUS at 09:11

## 2022-05-05 ASSESSMENT — PAIN SCALES - GENERAL
PAINLEVEL_OUTOF10: 0

## 2022-05-05 NOTE — PLAN OF CARE
Problem: Discharge Planning  Goal: Discharge to home or other facility with appropriate resources  Outcome: Progressing  Flowsheets (Taken 5/4/2022 2000)  Discharge to home or other facility with appropriate resources: Identify barriers to discharge with patient and caregiver     Problem: Safety - Adult  Goal: Free from fall injury  Outcome: Progressing     Problem: Pain  Goal: Verbalizes/displays adequate comfort level or baseline comfort level  Outcome: Progressing  Flowsheets (Taken 5/4/2022 2000)  Verbalizes/displays adequate comfort level or baseline comfort level: Encourage patient to monitor pain and request assistance     Problem: ABCDS Injury Assessment  Goal: Absence of physical injury  Outcome: Progressing

## 2022-05-05 NOTE — PROGRESS NOTES
[facility] CARDIOLOGY  CARDIAC ELECTROPHYSIOLOGY DEPARTMENT/DIVISION OF CARDIOLOGy  Inpatient Progress Report  PATIENT: Rc Alvarez Held  MEDICAL RECORD NUMBER: [id number]  DATE OF SERVICE:  5/5/2022  ATTENDING ELECTROPHYSIOLOGIST:  Kathia Pisano DO   REFERRING PHYSICIAN:   Marco A Hanson MD  CHIEF COMPLAINT: NSVT    HPI: Zelalem Parrish is a 80 y.o. female with a history of PVCs, polymyositis, DM, hypothyroidism, celiac disease, and osteoporosis. She is managed by Dr. Simon Earl with at acebutolol 200 mg twice daily, Crestor 10 mg daily, and PPI. Her medications also include azathioprine and prednisone, which were prescribed for her polymyositis. In approximately 1980, patient reports she was diagnosed with PVCs, which were treated with acebutolol, which resolved symptoms of palpitations. In 2021, she establish cardiology care with Dr. Simon Earl. She complained of exertional chest pain at that time, so exercise nuclear stress test was performed. Stress test reported no ischemia and normal LVEF, but did note frequent PVCs during exertion (on review of telemetry strips, site of origin is suspected to be RCC). No changes in management were made at that time. On 5/3/2022, she presented with chief complaint of palpitations. On telemetry she was noted to have frequent PVCs as well as episodes of nonsustained VT, which was managed with initiation of amiodarone infusion. EP service was consulted by admitting physician for evaluation and management of PVCs/NSVT. A 12 lead showed simlar morphology of NSVT to PVCs. Underwent a cardiac MRI that suggested idiopathic VT, and remained on amiodarone overnight she did have a significant reduction in PVC/nonsustained VT however she still reports symptomatic fluttering. She is willing to consider catheter ablation in the near future with flecainide/Toprol in the interim.      Prior cardiac testing:  · Cardiac MRI (05/04/2022): Normal LV size and function, LVEF 51%, normal RV size/function, no evidence of intra myocardial fibrosis, inflammatory patterns infiltrative disease or prior infarct. · Exercise nuclear stress test (2021): LVEF = 67%, normal perfusion with the exception of attenuation artifact due to soft tissue/breast attenuation at 85% of APMHR, perfusion, TID = 1.17, and increasing monomorphic PVCs with exertion and reduction in PVC burden with recovery, and average exercise capacity.     Past Medical History:   Diagnosis Date    Celiac disease     Palpitations     Polymyositis ([address]      Past Surgical History:   Procedure Laterality Date    HERNIA REPAIR      HYSTERECTOMY      LUMBAR LAMINECTOMY  ,     THYROIDECTOMY, COMPLETION        Family History   Problem Relation Age of Onset    Diabetes Mother     Heart Disease Mother     Cancer Father     Cancer Sister      There is no family history of sudden cardiac arrest    Social History     Tobacco Use    Smoking status: Former Smoker     Quit date: 3/15/1977     Years since quittin.1    Smokeless tobacco: Never Used   Substance Use Topics    Alcohol use: No       Current Facility-Administered Medications   Medication Dose Route Frequency Provider Last Rate Last Admin    flecainide (TAMBOCOR) tablet 100 mg  100 mg Oral 2 times per day GENIA Sidhu - CNP        0.9 % sodium chloride infusion   IntraVENous Continuous Iris Arthur  mL/hr at 22 1016 New Bag at 22 1016    perflutren lipid microspheres (DEFINITY) injection 1.65 mg  1.5 mL IntraVENous ONCE PRN GENIA Sidhu - CNP        azaTHIOprine [facility]) tablet 50 mg  50 mg Oral BID Nat Barclay MD   50 mg at 22 0911    calcium elemental (OSCAL) tablet 500 mg  500 mg Oral BID Nat Barclay MD   500 mg at 22 0911    famotidine (PEPCID) tablet 20 mg  20 mg Oral Daily Nat Barclay MD   20 mg at 22 0911    insulin glargine-yfgn (SEMGLEE-YFGN) injection vial 15 Units  15 Units SubCUTAneous Nightly Marco A Hanson MD   15 Units at 05/04/22 2100    levothyroxine (SYNTHROID) tablet 100 mcg  100 mcg Oral Daily Marco A Hanson MD   100 mcg at 05/05/22 0610    pantoprazole (PROTONIX) tablet 40 mg  40 mg Oral Daily Marco A Hanson MD   40 mg at 05/05/22 0911    predniSONE (DELTASONE) tablet 5 mg  5 mg Oral Daily Marco A Hanson MD   5 mg at 05/05/22 1557    rosuvastatin (CRESTOR) tablet 10 mg  10 mg Oral Nightly Marco A Hanson MD   10 mg at 05/04/22 2214    vitamin D (CHOLECALCIFEROL) tablet 1,000 Units  1,000 Units Oral Daily Marco A Hanson MD   1,000 Units at 05/05/22 0911    enoxaparin (LOVENOX) injection 40 mg  40 mg SubCUTAneous Daily Marco A Hanson MD   40 mg at 05/05/22 0911    acetaminophen (TYLENOL) tablet 650 mg  650 mg Oral Q6H PRN Marco A Hanson MD        metoprolol succinate (TOPROL XL) extended release tablet 50 mg  50 mg Oral Daily Angarmand Donald DO   50 mg at 05/05/22 0911      Allergies   Allergen Reactions    Sulfa Antibiotics Hives    Tetracyclines & Related Hives     ROS:   Constitutional: Negative for fever, activity change and appetite change. HENT: Negative for epistaxis. Eyes: Negative for diploplia, blurred vision. Respiratory: Negative for cough, chest tightness, shortness of breath and wheezing. Cardiovascular: pertinent positives in HPI  Gastrointestinal: Negative for abdominal pain and blood in stool. Genitourinary: Negative for hematuria and difficulty urinating. Musculoskeletal: Negative for myalgias and gait problem. Skin: Negative for color change and rash. Neurological: Negative for syncope and light-headedness. Psychiatric/Behavioral: Negative for confusion and agitation. The patient is not nervous/anxious.   Heme: no bleeding disorders, no melena or hematochezia  All other review of systems are negative     PHYSICAL EXAM:  /67   Pulse 72   Temp 97.8 °F (36.6 °C) (Oral)   Resp 18   Ht 5' 8\" (1.727 m)   Wt 147 lb (66.7 kg)   SpO2 98%   BMI 22.35 kg/m²    Constitutional: Well-developed, no acute distress, well groomed examined in room with family at bedside. Eyes: conjunctivae normal, no xanthelasma   Ears, Nose, Throat: oral mucosa moist, no cyanosis   Neck: supple, no JVD, no bruits, no thyromegaly   CV: normal rate, regular rhythm,  no murmurs, rubs, or gallops. PMI is nondisplaced, Peripheral pulses normal including bilateral radial and pedal pulses are normal in quality  Lungs: clear to auscultation bilaterally, normal respiratory effort without used of accessory muscles, no wheezes  Abdomen: soft, non-tender, bowel sounds present, no masses or hepatomegaly   Extremities: no digital clubbing, no edema   Skin: warm, no rashes   Neuro/Psych: A&O x 3, normal mood and affect    Data:    Recent Labs     05/03/22  0815 05/04/22 0457 05/05/22 0518   WBC 4.8 6.2 7.1   HGB 15.3 14.9 14.5   HCT 46.3 44.0 42.1    357 334     Recent Labs     05/03/22  0815 05/04/22 0457 05/05/22 0518    141 139   K 4.1 3.8 4.0    101 101   CO2 29 27 25   BUN 18 12 18   CREATININE 0.6 0.6 0.7   CALCIUM 9.2 9.1 9.4     No results for input(s): INR in the last 72 hours. Recent Labs     05/03/22  0815   TSH 1.710     Lab Results   Component Value Date    MG 2.1 05/03/2022     Telemetry: Sinus rhythm with frequent unifocal PVCs and episodes of NSVT decreased compared to 05/03     Assessment/plan:  1. PVCs/NSVT  -Idiopathic VT  - Nuclear stress test in 2021 was reportedly low risk. Frequent PVCs noted during exertion, which appear to be RCC origin based on morphology on twelve-lead ECG. - Historically managed with acebutolol.  -Cardiac MRI 05/04/2022 that showed no intracardial fibrosis, inflammation, infiltrative disease or prior infarct.   - She reports increase in PVC symptoms.  -Maintain potassium between 4 and 5 and magnesium between 2 and 3.  - Again ablation was discussed she does note an interest at this time, will attempt to schedule within the month. - Continue metoprolol XL 50 mg daily, will start Tambocor 100 mg BID prior to ablation.   - Continue to monitor on telemetry overnight and discharge in AM.    Thank you for allowing me to participate in your patient's care. Please call me if there are any questions. Discussed with Dr. Nathan Lobato.    Juni Nolasco, GENIA - CNP   Cardiac Electrophysiology  [address] [place] [address]

## 2022-05-05 NOTE — PROGRESS NOTES
Events all reviewed with patient  Remains relatively asymptomatic  Afebrile , vs stable  RRR  Lungs clear  Continue present regimen   All questions answered

## 2022-05-06 VITALS
HEIGHT: 68 IN | SYSTOLIC BLOOD PRESSURE: 141 MMHG | BODY MASS INDEX: 22.28 KG/M2 | WEIGHT: 147 LBS | TEMPERATURE: 97.9 F | OXYGEN SATURATION: 97 % | RESPIRATION RATE: 18 BRPM | HEART RATE: 79 BPM | DIASTOLIC BLOOD PRESSURE: 77 MMHG

## 2022-05-06 LAB
ALBUMIN SERPL-MCNC: 4 G/DL (ref 3.5–5.2)
ALP BLD-CCNC: 50 U/L (ref 35–104)
ALT SERPL-CCNC: 19 U/L (ref 0–32)
ANION GAP SERPL CALCULATED.3IONS-SCNC: 13 MMOL/L (ref 7–16)
AST SERPL-CCNC: 30 U/L (ref 0–31)
BILIRUB SERPL-MCNC: 0.4 MG/DL (ref 0–1.2)
BUN BLDV-MCNC: 17 MG/DL (ref 6–23)
CALCIUM SERPL-MCNC: 8.7 MG/DL (ref 8.6–10.2)
CHLORIDE BLD-SCNC: 103 MMOL/L (ref 98–107)
CO2: 23 MMOL/L (ref 22–29)
CREAT SERPL-MCNC: 0.6 MG/DL (ref 0.5–1)
EKG ATRIAL RATE: 68 BPM
EKG P AXIS: 53 DEGREES
EKG P-R INTERVAL: 168 MS
EKG Q-T INTERVAL: 430 MS
EKG QRS DURATION: 88 MS
EKG QTC CALCULATION (BAZETT): 457 MS
EKG R AXIS: 32 DEGREES
EKG T AXIS: 32 DEGREES
EKG VENTRICULAR RATE: 68 BPM
GFR AFRICAN AMERICAN: >60
GFR NON-AFRICAN AMERICAN: >60 ML/MIN/1.73
GLUCOSE BLD-MCNC: 132 MG/DL (ref 74–99)
HCT VFR BLD CALC: 43.6 % (ref 34–48)
HEMOGLOBIN: 14.6 G/DL (ref 11.5–15.5)
MAGNESIUM: 2.2 MG/DL (ref 1.6–2.6)
MCH RBC QN AUTO: 33.8 PG (ref 26–35)
MCHC RBC AUTO-ENTMCNC: 33.5 % (ref 32–34.5)
MCV RBC AUTO: 100.9 FL (ref 80–99.9)
METER GLUCOSE: 129 MG/DL (ref 74–99)
PDW BLD-RTO: 12.4 FL (ref 11.5–15)
PLATELET # BLD: 320 E9/L (ref 130–450)
PMV BLD AUTO: 9.9 FL (ref 7–12)
POTASSIUM SERPL-SCNC: 4.5 MMOL/L (ref 3.5–5)
RBC # BLD: 4.32 E12/L (ref 3.5–5.5)
SODIUM BLD-SCNC: 139 MMOL/L (ref 132–146)
TOTAL PROTEIN: 6.8 G/DL (ref 6.4–8.3)
WBC # BLD: 5.8 E9/L (ref 4.5–11.5)

## 2022-05-06 PROCEDURE — 6370000000 HC RX 637 (ALT 250 FOR IP): Performed by: STUDENT IN AN ORGANIZED HEALTH CARE EDUCATION/TRAINING PROGRAM

## 2022-05-06 PROCEDURE — 6370000000 HC RX 637 (ALT 250 FOR IP): Performed by: NURSE PRACTITIONER

## 2022-05-06 PROCEDURE — 82962 GLUCOSE BLOOD TEST: CPT

## 2022-05-06 PROCEDURE — 83735 ASSAY OF MAGNESIUM: CPT

## 2022-05-06 PROCEDURE — 85027 COMPLETE CBC AUTOMATED: CPT

## 2022-05-06 PROCEDURE — 93010 ELECTROCARDIOGRAM REPORT: CPT | Performed by: INTERNAL MEDICINE

## 2022-05-06 PROCEDURE — 36415 COLL VENOUS BLD VENIPUNCTURE: CPT

## 2022-05-06 PROCEDURE — 6360000002 HC RX W HCPCS: Performed by: INTERNAL MEDICINE

## 2022-05-06 PROCEDURE — 6370000000 HC RX 637 (ALT 250 FOR IP): Performed by: INTERNAL MEDICINE

## 2022-05-06 PROCEDURE — 93005 ELECTROCARDIOGRAM TRACING: CPT | Performed by: NURSE PRACTITIONER

## 2022-05-06 PROCEDURE — 80053 COMPREHEN METABOLIC PANEL: CPT

## 2022-05-06 RX ORDER — FLECAINIDE ACETATE 100 MG/1
100 TABLET ORAL EVERY 12 HOURS SCHEDULED
Qty: 60 TABLET | Refills: 3 | Status: SHIPPED | OUTPATIENT
Start: 2022-05-06 | End: 2022-06-29

## 2022-05-06 RX ORDER — CHOLECALCIFEROL (VITAMIN D3) 125 MCG
500 CAPSULE ORAL DAILY
Qty: 30 TABLET | Refills: 0
Start: 2022-05-06

## 2022-05-06 RX ORDER — METOPROLOL SUCCINATE 50 MG/1
50 TABLET, EXTENDED RELEASE ORAL DAILY
Qty: 30 TABLET | Refills: 3 | Status: SHIPPED | OUTPATIENT
Start: 2022-05-07 | End: 2022-06-20 | Stop reason: SDUPTHER

## 2022-05-06 RX ADMIN — METOPROLOL SUCCINATE 50 MG: 50 TABLET, EXTENDED RELEASE ORAL at 09:02

## 2022-05-06 RX ADMIN — LEVOTHYROXINE SODIUM 100 MCG: 0.1 TABLET ORAL at 06:49

## 2022-05-06 RX ADMIN — FAMOTIDINE 20 MG: 20 TABLET ORAL at 09:02

## 2022-05-06 RX ADMIN — AZATHIOPRINE 50 MG: 50 TABLET ORAL at 09:03

## 2022-05-06 RX ADMIN — FLECAINIDE ACETATE 100 MG: 100 TABLET ORAL at 09:03

## 2022-05-06 RX ADMIN — ENOXAPARIN SODIUM 40 MG: 100 INJECTION SUBCUTANEOUS at 09:03

## 2022-05-06 RX ADMIN — PREDNISONE 5 MG: 5 TABLET ORAL at 09:02

## 2022-05-06 RX ADMIN — CALCIUM 500 MG: 500 TABLET ORAL at 09:02

## 2022-05-06 RX ADMIN — PANTOPRAZOLE SODIUM 40 MG: 40 TABLET, DELAYED RELEASE ORAL at 09:02

## 2022-05-06 NOTE — CARE COORDINATION
Discharge order noted. Patient to discharge home, no needs and  to transport.     Laura Carrillo, MSW, LSW [phone]

## 2022-05-06 NOTE — PLAN OF CARE
Problem: Discharge Planning  Goal: Discharge to home or other facility with appropriate resources  Outcome: Adequate for Discharge     Problem: Safety - Adult  Goal: Free from fall injury  Outcome: Adequate for Discharge     Problem: Pain  Goal: Verbalizes/displays adequate comfort level or baseline comfort level  Outcome: Adequate for Discharge     Problem: ABCDS Injury Assessment  Goal: Absence of physical injury  Outcome: Adequate for Discharge

## 2022-05-06 NOTE — PROGRESS NOTES
CLINICAL PHARMACY NOTE: MEDS TO BEDS    Total # of Prescriptions Filled: 2   The following medications were delivered to the patient:  · flecanide 100  · Metoprolol succinate er 50    Additional Documentation:

## 2022-05-07 NOTE — DISCHARGE SUMMARY
[address]                  [address] [facility]  [facility]                               DISCHARGE SUMMARY    PATIENT NAME: Alia Muñoz                        :        1950  MED REC NO:   [id number]                            ROOM:       6417  ACCOUNT NO:   [de-identified]                           ADMIT DATE: 2022  PROVIDER:     Michelle Hendrix DO                  DISCHARGE DATE:  2022    HOSPITAL COURSE:  This patient is a 41-year-old female with a  complicated past medical history including chronic atrial premature  contractions and chronic ventricular premature contractions;  polymyositis, on immunosuppressive therapy; insulin-requiring type 2  diabetes mellitus; hypothyroidism; celiac disease who describes onset of  symptoms approximately two days prior to admission including  intermittent and recurrent paroxysms with palpitations and associated  lightheadedness. No chest pain or dyspnea of significance. Symptoms  progressed to the point where she had nearly constant irregular  heartbeat, prompting presentation to the emergency room. Evaluation  there demonstrated this to be a nonsustained ventricular tachycardia. The patient was initiated on amiodarone infusion in the ED and  consultation with Electrophysiology was obtained. The patient was admitted to telemetry with improvement overall in her  symptomatology. After evaluation by EP, it was decided to stop her  chronic Sectral and to begin metoprolol extended release 50 mg daily. She underwent cardiac MRI imaging which was largely unremarkable. At  this point, she was started on Tambocor 100 mg twice a day with the  expected plan to do ablation in the near future. She was followed up on  telemetry for several days with no further worsening of symptomatology. She remained asymptomatic and hemodynamically stable throughout the  entire subsequent hospital course.   The patient was discharged to home  on 05/06/2022. CONDITION ON DISCHARGE:  Improved. DISPOSITION:  Home. FINAL DIAGNOSES:  1.  Nonsustained ventricular tachycardia. 2.  Polymyositis. 3.  Insulin-requiring type 2 diabetes mellitus. 4.  Hypothyroidism. 5.  Celiac disease. MEDICATIONS ON DISCHARGE:  Include:  1. Tambocor 100 mg every 12 hours. 2.  Metoprolol succinate extended release 50 mg daily. 3.  Vitamin B12 500 mcg daily. 4.  Calcium carbonate 500 mg twice a day. 5.  Metformin extended release 500 mg twice a day. 6.  Lantus AllStar insulin 15 units nightly. 7.  Rosuvastatin 10 mg nightly. 8.  Prednisone 5 mg daily. 9.  Imuran 50 mg twice a day. 10.  Levothyroxine 100 mcg six times weekly. 11.  Pepcid 20 mg daily. 12.  Protonix 40 mg daily. 13.  Vitamin D 1000 units daily. Diagnoses as listed above. Medications as listed above.         Sharri Croft DO    D: 05/07/2022 15:24:58       T: 05/07/2022 15:27:28     [email or web address]  Job#: [id number]     Doc#: [id number]    CC:

## 2022-05-13 ENCOUNTER — NURSE ONLY (OUTPATIENT)
Dept: NON INVASIVE DIAGNOSTICS | Age: 72
End: 2022-05-13
Payer: COMMERCIAL

## 2022-05-13 DIAGNOSIS — I49.3 PVC (PREMATURE VENTRICULAR CONTRACTION): ICD-10-CM

## 2022-05-13 DIAGNOSIS — I47.29 NSVT (NONSUSTAINED VENTRICULAR TACHYCARDIA): Primary | ICD-10-CM

## 2022-05-13 DIAGNOSIS — I47.20 V-TACH: ICD-10-CM

## 2022-05-13 PROCEDURE — 93000 ELECTROCARDIOGRAM COMPLETE: CPT | Performed by: STUDENT IN AN ORGANIZED HEALTH CARE EDUCATION/TRAINING PROGRAM

## 2022-05-13 NOTE — PROGRESS NOTES
Patient was seen in the Office today to have EKG per Dr. Becky Reid. Pt tolerated EKG. PT has no complaints.     Electronically signed by Kelly Hernandez MA on 5/13/2022 at 9:30 AM

## 2022-05-23 ENCOUNTER — TELEPHONE (OUTPATIENT)
Dept: CARDIAC CATH/INVASIVE PROCEDURES | Age: 72
End: 2022-05-23

## 2022-05-23 NOTE — TELEPHONE ENCOUNTER
Reminded patient of scheduled procedure on 5/24   Instructions given and COVID questionnaire completed.

## 2022-05-24 ENCOUNTER — HOSPITAL ENCOUNTER (OUTPATIENT)
Dept: CARDIAC CATH/INVASIVE PROCEDURES | Age: 72
Setting detail: OBSERVATION
Discharge: HOME | End: 2022-05-25
Attending: STUDENT IN AN ORGANIZED HEALTH CARE EDUCATION/TRAINING PROGRAM | Admitting: STUDENT IN AN ORGANIZED HEALTH CARE EDUCATION/TRAINING PROGRAM
Payer: COMMERCIAL

## 2022-05-24 ENCOUNTER — ANESTHESIA EVENT (OUTPATIENT)
Dept: CARDIAC CATH/INVASIVE PROCEDURES | Age: 72
End: 2022-05-24

## 2022-05-24 ENCOUNTER — TELEPHONE (OUTPATIENT)
Dept: NON INVASIVE DIAGNOSTICS | Age: 72
End: 2022-05-24

## 2022-05-24 ENCOUNTER — ANESTHESIA (OUTPATIENT)
Dept: CARDIAC CATH/INVASIVE PROCEDURES | Age: 72
End: 2022-05-24

## 2022-05-24 DIAGNOSIS — I47.20 VT (VENTRICULAR TACHYCARDIA): ICD-10-CM

## 2022-05-24 DIAGNOSIS — I49.3 PVC (PREMATURE VENTRICULAR CONTRACTION): ICD-10-CM

## 2022-05-24 DIAGNOSIS — I49.3 PVC (PREMATURE VENTRICULAR CONTRACTION): Primary | ICD-10-CM

## 2022-05-24 LAB
ANION GAP SERPL CALCULATED.3IONS-SCNC: 13 MMOL/L (ref 7–16)
BASOPHILS ABSOLUTE: 0.03 E9/L (ref 0–0.2)
BASOPHILS RELATIVE PERCENT: 0.5 % (ref 0–2)
BUN BLDV-MCNC: 19 MG/DL (ref 6–23)
CALCIUM SERPL-MCNC: 9.3 MG/DL (ref 8.6–10.2)
CHLORIDE BLD-SCNC: 101 MMOL/L (ref 98–107)
CO2: 27 MMOL/L (ref 22–29)
CREAT SERPL-MCNC: 0.7 MG/DL (ref 0.5–1)
EKG ATRIAL RATE: 65 BPM
EKG ATRIAL RATE: 72 BPM
EKG P AXIS: 29 DEGREES
EKG P AXIS: 65 DEGREES
EKG P-R INTERVAL: 164 MS
EKG P-R INTERVAL: 168 MS
EKG Q-T INTERVAL: 384 MS
EKG Q-T INTERVAL: 390 MS
EKG QRS DURATION: 74 MS
EKG QRS DURATION: 82 MS
EKG QTC CALCULATION (BAZETT): 399 MS
EKG QTC CALCULATION (BAZETT): 427 MS
EKG R AXIS: 24 DEGREES
EKG R AXIS: 38 DEGREES
EKG T AXIS: -2 DEGREES
EKG T AXIS: 27 DEGREES
EKG VENTRICULAR RATE: 65 BPM
EKG VENTRICULAR RATE: 72 BPM
EOSINOPHILS ABSOLUTE: 0.07 E9/L (ref 0.05–0.5)
EOSINOPHILS RELATIVE PERCENT: 1.1 % (ref 0–6)
GFR AFRICAN AMERICAN: >60
GFR NON-AFRICAN AMERICAN: >60 ML/MIN/1.73
GLUCOSE BLD-MCNC: 112 MG/DL (ref 74–99)
HCT VFR BLD CALC: 43.3 % (ref 34–48)
HEMOGLOBIN: 14.4 G/DL (ref 11.5–15.5)
IMMATURE GRANULOCYTES #: 0.03 E9/L
IMMATURE GRANULOCYTES %: 0.5 % (ref 0–5)
LYMPHOCYTES ABSOLUTE: 1.11 E9/L (ref 1.5–4)
LYMPHOCYTES RELATIVE PERCENT: 18 % (ref 20–42)
MAGNESIUM: 1.9 MG/DL (ref 1.6–2.6)
MCH RBC QN AUTO: 33.3 PG (ref 26–35)
MCHC RBC AUTO-ENTMCNC: 33.3 % (ref 32–34.5)
MCV RBC AUTO: 100.2 FL (ref 80–99.9)
METER GLUCOSE: 109 MG/DL (ref 74–99)
METER GLUCOSE: 115 MG/DL (ref 74–99)
MONOCYTES ABSOLUTE: 0.77 E9/L (ref 0.1–0.95)
MONOCYTES RELATIVE PERCENT: 12.5 % (ref 2–12)
NEUTROPHILS ABSOLUTE: 4.14 E9/L (ref 1.8–7.3)
NEUTROPHILS RELATIVE PERCENT: 67.4 % (ref 43–80)
PDW BLD-RTO: 12.3 FL (ref 11.5–15)
PLATELET # BLD: 334 E9/L (ref 130–450)
PMV BLD AUTO: 9.5 FL (ref 7–12)
POTASSIUM SERPL-SCNC: 3.6 MMOL/L (ref 3.5–5)
RBC # BLD: 4.32 E12/L (ref 3.5–5.5)
SODIUM BLD-SCNC: 141 MMOL/L (ref 132–146)
WBC # BLD: 6.2 E9/L (ref 4.5–11.5)

## 2022-05-24 PROCEDURE — 3700000001 HC ADD 15 MINUTES (ANESTHESIA)

## 2022-05-24 PROCEDURE — 93620 COMP EP EVL R AT VEN PAC&REC: CPT | Performed by: STUDENT IN AN ORGANIZED HEALTH CARE EDUCATION/TRAINING PROGRAM

## 2022-05-24 PROCEDURE — C1892 INTRO/SHEATH,FIXED,PEEL-AWAY: HCPCS

## 2022-05-24 PROCEDURE — 6370000000 HC RX 637 (ALT 250 FOR IP): Performed by: NURSE PRACTITIONER

## 2022-05-24 PROCEDURE — C1894 INTRO/SHEATH, NON-LASER: HCPCS

## 2022-05-24 PROCEDURE — 93662 INTRACARDIAC ECG (ICE): CPT | Performed by: STUDENT IN AN ORGANIZED HEALTH CARE EDUCATION/TRAINING PROGRAM

## 2022-05-24 PROCEDURE — 6360000002 HC RX W HCPCS: Performed by: NURSE PRACTITIONER

## 2022-05-24 PROCEDURE — C1730 CATH, EP, 19 OR FEW ELECT: HCPCS

## 2022-05-24 PROCEDURE — 2500000003 HC RX 250 WO HCPCS: Performed by: NURSE ANESTHETIST, CERTIFIED REGISTERED

## 2022-05-24 PROCEDURE — 85025 COMPLETE CBC W/AUTO DIFF WBC: CPT

## 2022-05-24 PROCEDURE — 2580000003 HC RX 258: Performed by: NURSE ANESTHETIST, CERTIFIED REGISTERED

## 2022-05-24 PROCEDURE — G0269 OCCLUSIVE DEVICE IN VEIN ART: HCPCS

## 2022-05-24 PROCEDURE — 93623 PRGRMD STIMJ&PACG IV RX NFS: CPT | Performed by: STUDENT IN AN ORGANIZED HEALTH CARE EDUCATION/TRAINING PROGRAM

## 2022-05-24 PROCEDURE — 83735 ASSAY OF MAGNESIUM: CPT

## 2022-05-24 PROCEDURE — G0378 HOSPITAL OBSERVATION PER HR: HCPCS

## 2022-05-24 PROCEDURE — 93005 ELECTROCARDIOGRAM TRACING: CPT | Performed by: STUDENT IN AN ORGANIZED HEALTH CARE EDUCATION/TRAINING PROGRAM

## 2022-05-24 PROCEDURE — 2709999900 HC NON-CHARGEABLE SUPPLY

## 2022-05-24 PROCEDURE — C1893 INTRO/SHEATH, FIXED,NON-PEEL: HCPCS

## 2022-05-24 PROCEDURE — C1759 CATH, INTRA ECHOCARDIOGRAPHY: HCPCS

## 2022-05-24 PROCEDURE — 6360000002 HC RX W HCPCS

## 2022-05-24 PROCEDURE — 6360000002 HC RX W HCPCS: Performed by: NURSE ANESTHETIST, CERTIFIED REGISTERED

## 2022-05-24 PROCEDURE — 93602 INTRA-ATRIAL RECORDING: CPT

## 2022-05-24 PROCEDURE — 80048 BASIC METABOLIC PNL TOTAL CA: CPT

## 2022-05-24 PROCEDURE — 93603 RIGHT VENTRICULAR RECORDING: CPT

## 2022-05-24 PROCEDURE — 2580000003 HC RX 258: Performed by: STUDENT IN AN ORGANIZED HEALTH CARE EDUCATION/TRAINING PROGRAM

## 2022-05-24 PROCEDURE — G0379 DIRECT REFER HOSPITAL OBSERV: HCPCS

## 2022-05-24 PROCEDURE — 2500000003 HC RX 250 WO HCPCS

## 2022-05-24 PROCEDURE — 3700000000 HC ANESTHESIA ATTENDED CARE

## 2022-05-24 PROCEDURE — 36415 COLL VENOUS BLD VENIPUNCTURE: CPT

## 2022-05-24 PROCEDURE — 82962 GLUCOSE BLOOD TEST: CPT

## 2022-05-24 PROCEDURE — C1732 CATH, EP, DIAG/ABL, 3D/VECT: HCPCS

## 2022-05-24 PROCEDURE — 93613 INTRACARDIAC EPHYS 3D MAPG: CPT | Performed by: STUDENT IN AN ORGANIZED HEALTH CARE EDUCATION/TRAINING PROGRAM

## 2022-05-24 PROCEDURE — 93308 TTE F-UP OR LMTD: CPT

## 2022-05-24 RX ORDER — FLECAINIDE ACETATE 100 MG/1
100 TABLET ORAL EVERY 12 HOURS SCHEDULED
Status: DISCONTINUED | OUTPATIENT
Start: 2022-05-24 | End: 2022-05-25 | Stop reason: HOSPADM

## 2022-05-24 RX ORDER — SODIUM CHLORIDE 9 MG/ML
INJECTION, SOLUTION INTRAVENOUS PRN
Status: DISCONTINUED | OUTPATIENT
Start: 2022-05-24 | End: 2022-05-25 | Stop reason: HOSPADM

## 2022-05-24 RX ORDER — FENTANYL CITRATE 50 UG/ML
INJECTION, SOLUTION INTRAMUSCULAR; INTRAVENOUS PRN
Status: DISCONTINUED | OUTPATIENT
Start: 2022-05-24 | End: 2022-05-24 | Stop reason: SDUPTHER

## 2022-05-24 RX ORDER — ACETAMINOPHEN 325 MG/1
650 TABLET ORAL EVERY 4 HOURS PRN
Status: DISCONTINUED | OUTPATIENT
Start: 2022-05-24 | End: 2022-05-25 | Stop reason: HOSPADM

## 2022-05-24 RX ORDER — PREDNISONE 1 MG/1
5 TABLET ORAL DAILY
Status: DISCONTINUED | OUTPATIENT
Start: 2022-05-24 | End: 2022-05-25 | Stop reason: HOSPADM

## 2022-05-24 RX ORDER — LEVOTHYROXINE SODIUM 0.1 MG/1
100 TABLET ORAL
Status: DISCONTINUED | OUTPATIENT
Start: 2022-05-25 | End: 2022-05-25 | Stop reason: HOSPADM

## 2022-05-24 RX ORDER — PROPOFOL 10 MG/ML
INJECTION, EMULSION INTRAVENOUS CONTINUOUS PRN
Status: DISCONTINUED | OUTPATIENT
Start: 2022-05-24 | End: 2022-05-24 | Stop reason: SDUPTHER

## 2022-05-24 RX ORDER — SODIUM CHLORIDE 0.9 % (FLUSH) 0.9 %
5-40 SYRINGE (ML) INJECTION PRN
Status: DISCONTINUED | OUTPATIENT
Start: 2022-05-24 | End: 2022-05-25 | Stop reason: HOSPADM

## 2022-05-24 RX ORDER — SODIUM CHLORIDE 9 MG/ML
INJECTION, SOLUTION INTRAVENOUS CONTINUOUS PRN
Status: DISCONTINUED | OUTPATIENT
Start: 2022-05-24 | End: 2022-05-24 | Stop reason: SDUPTHER

## 2022-05-24 RX ORDER — ROSUVASTATIN CALCIUM 10 MG/1
10 TABLET, COATED ORAL NIGHTLY
Status: DISCONTINUED | OUTPATIENT
Start: 2022-05-24 | End: 2022-05-25 | Stop reason: HOSPADM

## 2022-05-24 RX ORDER — VITAMIN B COMPLEX
1000 TABLET ORAL DAILY
Status: DISCONTINUED | OUTPATIENT
Start: 2022-05-24 | End: 2022-05-25 | Stop reason: HOSPADM

## 2022-05-24 RX ORDER — INSULIN GLARGINE-YFGN 100 [IU]/ML
15 INJECTION, SOLUTION SUBCUTANEOUS NIGHTLY
Status: DISCONTINUED | OUTPATIENT
Start: 2022-05-24 | End: 2022-05-25 | Stop reason: HOSPADM

## 2022-05-24 RX ORDER — METFORMIN HYDROCHLORIDE 500 MG/1
500 TABLET, EXTENDED RELEASE ORAL 2 TIMES DAILY
Status: DISCONTINUED | OUTPATIENT
Start: 2022-05-24 | End: 2022-05-25 | Stop reason: HOSPADM

## 2022-05-24 RX ORDER — SODIUM CHLORIDE 0.9 % (FLUSH) 0.9 %
5-40 SYRINGE (ML) INJECTION EVERY 12 HOURS SCHEDULED
Status: DISCONTINUED | OUTPATIENT
Start: 2022-05-24 | End: 2022-05-25 | Stop reason: HOSPADM

## 2022-05-24 RX ORDER — CALCIUM CARBONATE 200(500)MG
500 TABLET,CHEWABLE ORAL 2 TIMES DAILY
Status: DISCONTINUED | OUTPATIENT
Start: 2022-05-24 | End: 2022-05-25 | Stop reason: HOSPADM

## 2022-05-24 RX ORDER — MIDAZOLAM HYDROCHLORIDE 1 MG/ML
INJECTION INTRAMUSCULAR; INTRAVENOUS PRN
Status: DISCONTINUED | OUTPATIENT
Start: 2022-05-24 | End: 2022-05-24 | Stop reason: SDUPTHER

## 2022-05-24 RX ORDER — LANOLIN ALCOHOL/MO/W.PET/CERES
500 CREAM (GRAM) TOPICAL DAILY
Status: DISCONTINUED | OUTPATIENT
Start: 2022-05-24 | End: 2022-05-25 | Stop reason: HOSPADM

## 2022-05-24 RX ORDER — FAMOTIDINE 20 MG/1
20 TABLET, FILM COATED ORAL DAILY
Status: DISCONTINUED | OUTPATIENT
Start: 2022-05-24 | End: 2022-05-25 | Stop reason: HOSPADM

## 2022-05-24 RX ORDER — LEVOTHYROXINE SODIUM 0.1 MG/1
100 TABLET ORAL DAILY
Status: DISCONTINUED | OUTPATIENT
Start: 2022-05-24 | End: 2022-05-24 | Stop reason: DRUGHIGH

## 2022-05-24 RX ORDER — AZATHIOPRINE 50 MG/1
50 TABLET ORAL 2 TIMES DAILY
Status: DISCONTINUED | OUTPATIENT
Start: 2022-05-24 | End: 2022-05-25 | Stop reason: HOSPADM

## 2022-05-24 RX ORDER — ONDANSETRON 2 MG/ML
INJECTION INTRAMUSCULAR; INTRAVENOUS PRN
Status: DISCONTINUED | OUTPATIENT
Start: 2022-05-24 | End: 2022-05-24 | Stop reason: SDUPTHER

## 2022-05-24 RX ORDER — SODIUM CHLORIDE 9 MG/ML
INJECTION, SOLUTION INTRAVENOUS CONTINUOUS
Status: DISCONTINUED | OUTPATIENT
Start: 2022-05-24 | End: 2022-05-25 | Stop reason: HOSPADM

## 2022-05-24 RX ADMIN — FENTANYL CITRATE 25 MCG: 50 INJECTION, SOLUTION INTRAMUSCULAR; INTRAVENOUS at 09:47

## 2022-05-24 RX ADMIN — Medication 1000 UNITS: at 16:43

## 2022-05-24 RX ADMIN — Medication 500 MCG: at 16:43

## 2022-05-24 RX ADMIN — FENTANYL CITRATE 25 MCG: 50 INJECTION, SOLUTION INTRAMUSCULAR; INTRAVENOUS at 09:35

## 2022-05-24 RX ADMIN — AZATHIOPRINE 50 MG: 50 TABLET ORAL at 20:39

## 2022-05-24 RX ADMIN — FENTANYL CITRATE 25 MCG: 50 INJECTION, SOLUTION INTRAMUSCULAR; INTRAVENOUS at 08:42

## 2022-05-24 RX ADMIN — FENTANYL CITRATE 25 MCG: 50 INJECTION, SOLUTION INTRAMUSCULAR; INTRAVENOUS at 08:44

## 2022-05-24 RX ADMIN — SODIUM CHLORIDE: 9 INJECTION, SOLUTION INTRAVENOUS at 07:40

## 2022-05-24 RX ADMIN — HYDROCORTISONE SODIUM SUCCINATE 100 MG: 100 INJECTION, POWDER, FOR SOLUTION INTRAMUSCULAR; INTRAVENOUS at 08:08

## 2022-05-24 RX ADMIN — ROSUVASTATIN 10 MG: 10 TABLET, FILM COATED ORAL at 20:39

## 2022-05-24 RX ADMIN — FAMOTIDINE 20 MG: 20 TABLET ORAL at 16:43

## 2022-05-24 RX ADMIN — ONDANSETRON 4 MG: 2 INJECTION INTRAMUSCULAR; INTRAVENOUS at 10:08

## 2022-05-24 RX ADMIN — FENTANYL CITRATE 25 MCG: 50 INJECTION, SOLUTION INTRAMUSCULAR; INTRAVENOUS at 09:31

## 2022-05-24 RX ADMIN — FENTANYL CITRATE 25 MCG: 50 INJECTION, SOLUTION INTRAMUSCULAR; INTRAVENOUS at 10:10

## 2022-05-24 RX ADMIN — FLECAINIDE ACETATE 100 MG: 100 TABLET ORAL at 20:39

## 2022-05-24 RX ADMIN — PROPOFOL 50 MCG/KG/MIN: 10 INJECTION, EMULSION INTRAVENOUS at 08:30

## 2022-05-24 RX ADMIN — Medication 2 MCG/MIN: at 08:05

## 2022-05-24 RX ADMIN — MIDAZOLAM 1 MG: 1 INJECTION INTRAMUSCULAR; INTRAVENOUS at 08:23

## 2022-05-24 RX ADMIN — SODIUM CHLORIDE: 9 INJECTION, SOLUTION INTRAVENOUS at 18:48

## 2022-05-24 RX ADMIN — METFORMIN HYDROCHLORIDE 500 MG: 500 TABLET, EXTENDED RELEASE ORAL at 20:39

## 2022-05-24 RX ADMIN — FENTANYL CITRATE 50 MCG: 50 INJECTION, SOLUTION INTRAMUSCULAR; INTRAVENOUS at 08:30

## 2022-05-24 RX ADMIN — MIDAZOLAM 1 MG: 1 INJECTION INTRAMUSCULAR; INTRAVENOUS at 08:00

## 2022-05-24 ASSESSMENT — LIFESTYLE VARIABLES
HOW OFTEN DO YOU HAVE A DRINK CONTAINING ALCOHOL: NEVER
HOW MANY STANDARD DRINKS CONTAINING ALCOHOL DO YOU HAVE ON A TYPICAL DAY: 1 OR 2

## 2022-05-24 NOTE — TELEPHONE ENCOUNTER
----- Message from GENIA Nagel CNP sent at 5/24/2022 12:59 PM EDT -----  Maxxprecious Guy was seen inpatient for ablation and will need a 14 day Zio Xt at the time of discharge, thanks.

## 2022-05-24 NOTE — ANESTHESIA POSTPROCEDURE EVALUATION
Department of Anesthesiology  Postprocedure Note    Patient: Renan Lion  MRN: [id number]  YOB: 1950  Date of evaluation: 5/24/2022  Time:  1:10 PM     Procedure Summary     Date: 05/24/22 Room / Location: [facility]    Anesthesia Start: 0740 Anesthesia Stop: 6621    Procedure: ABLATION WITH ANESTHESIA Diagnosis:       Ventricular tachycardia      VT (ventricular tachycardia) (HCC)      PVC (premature ventricular contraction)    Scheduled Providers: GENIA Arenas - CRNA; Delmi Babcock DO Responsible Provider: Delmi Babcock DO    Anesthesia Type: MAC ASA Status: 3          Anesthesia Type: No value filed. Indy Phase I:      Indy Phase II:      Last vitals: Reviewed and per EMR flowsheets.        Anesthesia Post Evaluation    Patient location during evaluation: bedside  Patient participation: complete - patient cannot participate  Level of consciousness: awake and alert  Airway patency: patent  Nausea & Vomiting: no nausea and no vomiting  Complications: no  Cardiovascular status: blood pressure returned to baseline  Respiratory status: acceptable  Hydration status: euvolemic  Multimodal analgesia pain management approach

## 2022-05-24 NOTE — ANESTHESIA PRE PROCEDURE
Department of Anesthesiology  Preprocedure Note       Name:  Tylor Orellana   Age:  80 y.o.  :  1950                                          MRN:  [id number]         Date:  2022      Surgeon: Alina Eid    Procedure: Ablation with Anesthesia    Medications prior to admission:   Prior to Admission medications    Medication Sig Start Date End Date Taking?  Authorizing Provider   flecainide (TAMBOCOR) 100 MG tablet Take 1 tablet by mouth every 12 hours 22   GENIA Mendiola CNP   metoprolol succinate (TOPROL XL) 50 MG extended release tablet Take 1 tablet by mouth daily 22   GENIA Mendiola - CNP   vitamin B-12 (CYANOCOBALAMIN) 500 MCG tablet Take 1 tablet by mouth daily 22   Louie Aaron MD   calcium carbonate (OSCAL) 500 MG TABS tablet Take 500 mg by mouth 2 times daily    Historical Provider, MD   metFORMIN (GLUCOPHAGE-XR) 500 MG extended release tablet Take 500 mg by mouth 2 times daily    Historical Provider, MD   LANBRITTNEY SOLOSTAR 100 UNIT/ML injection pen Inject 15 Units into the skin nightly  20   Historical Provider, MD   rosuvastatin (CRESTOR) 10 MG tablet Take 10 mg by mouth nightly     Historical Provider, MD   predniSONE (DELTASONE) 5 MG tablet Take 5 mg by mouth daily     Historical Provider, MD   azaTHIOprine (IMURAN) 50 MG tablet Take 50 mg by mouth 2 times daily    Historical Provider, MD   levothyroxine (SYNTHROID) 100 MCG tablet Take 100 mcg by mouth Six times weekly Given Monday,Tuesday,Wednesday,Thursday,Friday,Saturday    Historical Provider, MD   famotidine (PEPCID) 20 MG tablet Take 20 mg by mouth daily     Historical Provider, MD   pantoprazole (PROTONIX) 40 MG tablet Take 40 mg by mouth daily    Historical Provider, MD   vitamin D (CHOLECALCIFEROL) 1000 UNITS TABS tablet Take 1,000 Units by mouth daily     Historical Provider, MD       Current medications:    Current Outpatient Medications   Medication Sig Dispense Refill    flecainide (TAMBOCOR) 100 MG tablet Take 1 tablet by mouth every 12 hours 60 tablet 3    metoprolol succinate (TOPROL XL) 50 MG extended release tablet Take 1 tablet by mouth daily 30 tablet 3    vitamin B-12 (CYANOCOBALAMIN) 500 MCG tablet Take 1 tablet by mouth daily 30 tablet 0    calcium carbonate (OSCAL) 500 MG TABS tablet Take 500 mg by mouth 2 times daily      metFORMIN (GLUCOPHAGE-XR) 500 MG extended release tablet Take 500 mg by mouth 2 times daily      LANTUS SOLOSTAR 100 UNIT/ML injection pen Inject 15 Units into the skin nightly       rosuvastatin (CRESTOR) 10 MG tablet Take 10 mg by mouth nightly       predniSONE (DELTASONE) 5 MG tablet Take 5 mg by mouth daily       azaTHIOprine (IMURAN) 50 MG tablet Take 50 mg by mouth 2 times daily      levothyroxine (SYNTHROID) 100 MCG tablet Take 100 mcg by mouth Six times weekly Given Monday,Tuesday,Wednesday,Thursday,Friday,Saturday      famotidine (PEPCID) 20 MG tablet Take 20 mg by mouth daily       pantoprazole (PROTONIX) 40 MG tablet Take 40 mg by mouth daily      vitamin D (CHOLECALCIFEROL) 1000 UNITS TABS tablet Take 1,000 Units by mouth daily        No current facility-administered medications for this encounter. Allergies:     Allergies   Allergen Reactions    Sulfa Antibiotics Hives    Tetracyclines & Related Hives       Problem List:    Patient Active Problem List   Diagnosis Code    Osteoporosis M81.0    [facility] I47.2    NSVT (nonsustained ventricular tachycardia) (HCC) I47.2    PVC (premature ventricular contraction) I49.3       Past Medical History:        Diagnosis Date    Celiac disease     Palpitations     [facility]        Past Surgical History:        Procedure Laterality Date    HERNIA REPAIR      HYSTERECTOMY  1992    LUMBAR LAMINECTOMY  1985, 26    THYROIDECTOMY, COMPLETION         Social History:    Social History     Tobacco Use    Smoking status: Former Smoker     Quit date: 3/15/1977     Years since quittin.2    Smokeless tobacco: Never Used   Substance Use Topics    Alcohol use: No                                Counseling given: Not Answered      Vital Signs (Current): There were no vitals filed for this visit. BP Readings from Last 3 Encounters:   22 (!) 141/77   21 (!) 163/67   21 130/68       NPO Status:  > 8hrs                                                                               BMI:   Wt Readings from Last 3 Encounters:   22 147 lb (66.7 kg)   21 140 lb (63.5 kg)   21 142 lb (64.4 kg)     There is no height or weight on file to calculate BMI.    CBC:   Lab Results   Component Value Date    WBC 5.8 2022    RBC 4.32 2022    HGB 14.6 2022    HCT 43.6 2022    .9 2022    RDW 12.4 2022     2022       CMP:   Lab Results   Component Value Date     2022    K 4.5 2022     2022    CO2 23 2022    BUN 17 2022    CREATININE 0.6 2022    GFRAA >60 2022    LABGLOM >60 2022    GLUCOSE 132 2022    PROT 6.8 2022    CALCIUM 8.7 2022    BILITOT 0.4 2022    ALKPHOS 50 2022    AST 30 2022    ALT 19 2022       POC Tests: No results for input(s): POCGLU, POCNA, POCK, POCCL, POCBUN, POCHEMO, POCHCT in the last 72 hours. Coags: No results found for: PROTIME, INR, APTT    HCG (If Applicable): No results found for: PREGTESTUR, PREGSERUM, HCG, HCGQUANT     ABGs: No results found for: PHART, PO2ART, ZOK2MTE, YVJ4WFH, BEART, N6DEGPLQ     Type & Screen (If Applicable):  No results found for: LABABO, LABRH    Drug/Infectious Status (If Applicable):  No results found for: HIV, HEPCAB    COVID-19 Screening (If Applicable): No results found for: COVID19    22 EKG  Sinus  Rhythm, PAC, Nonspecific T-abnormality.      Anesthesia Evaluation  Nursing notes reviewed no history of anesthetic

## 2022-05-24 NOTE — BRIEF OP NOTE
Brief Postoperative Note      Patient: Jarad Loya  YOB: 1950  MRN: [id number]    Date of Procedure: 5/24/2022    Pre-Op Diagnosis: PVC    Post-Op Diagnosis: Same       Procedure: EP study, 3D electro-anatomic map, ICE, isoproterenol infusion    Surgeon: Louann Worthington DO    Assistant: None    Anesthesia: monitored anesthesia care, see separate Anesthesia note    Estimated Blood Loss (mL): less than 50     Procedure details:  Patient presented to the EP lab in sinus rhythm without PVCs. Isoproterenol infusion initiated, which provoked PVCs. PVCs were monomorphic in nature and similar morphology to PVCs noted on prior ECGs. Ultrasound-guided femoral vein access:  - Right: 8.5 Jordanian 63 cm SRO sheath, 9 Jordanian locking sheath  - Left: 7 Jordanian short sheath, 9 Jordanian short sheath  A hexapolar RV catheter, CS catheter, ice catheter, and Decadron have catheter introduced to the heart. Baseline ice images revealed trace pericardial effusion. A 3D electroanatomic map of the heart was performed during sinus rhythm with intermittent episodes of PVC and NSVT. Earliest site of activation was noted to be an anterior RVOT (43 ms pre-QRS, QS unipolar signal). Negative catheter was removed in preparation for to introduce ablation catheter. Ice images repeated and revealed slight progression in pericardial effusion from trace to small. Patient remained hemodynamically stable. Due to concern for progression of pericardial effusion, the decision was made to end the procedure. Catheters were removed except for ice catheter. Ice images reassessed, which revealed no change in small pericardial effusion. Patient vital signs remained stable. Ice catheter removed. Sheath removed and hemostasis achieved with Abbott Perclose pro style device (1 at each site) and manual pressure. Summary: Anterior RVOT PVC (43 ms pre-QRS, QS unipolar signal).   Ice images with progression of baseline pericardial effusion from trace to small. Vital signs remained stable. Admit patient for observation and overnight monitoring. Stat TTE was performed at the end of the procedure which noted a small pericardial effusion. TTE will be repeated prior to the end of business day to reassess pericardial effusion.   Electronically signed by Paty Donovan DO on 5/24/2022 at 10:45 AM

## 2022-05-24 NOTE — H&P
[address] CARDIOLOGY  CARDIAC ELECTROPHYSIOLOGY DEPARTMENT/DIVISION OF CARDIOLOGy  H&P Note  PATIENT: Elvia Tapia Held  MEDICAL RECORD NUMBER: [id number]  DATE OF SERVICE:  5/24/2022  ATTENDING ELECTROPHYSIOLOGIST:  Papa Toledo DO   REFERRING PHYSICIAN:   Jay Gonsales MD  CHIEF COMPLAINT: NSVT    HPI: Hair Gould is a 80 y.o. female with a history of PVCs, polymyositis, DM, hypothyroidism, celiac disease, and osteoporosis. She is managed by Dr. Esther Ivan with at flecainide 100 mg BID, metoprolol XL 50 mg daily, crestor 10 mg daily, and PPI. Her medications also include azathioprine and prednisone, which were prescribed for her polymyositis. In approximately 1980, patient reports she was diagnosed with PVCs, which were treated with acebutolol, which resolved symptoms of palpitations. In 2021, she establish cardiology care with Dr. Esther Ivan. She complained of exertional chest pain at that time, so exercise nuclear stress test was performed. Stress test reported no ischemia and normal LVEF, but did note frequent PVCs during exertion (on review of telemetry strips, site of origin is suspected to be RCC). No changes in management were made at that time. On 5/3/2022, she presented with chief complaint of palpitations. On telemetry she was noted to have frequent PVCs as well as episodes of nonsustained VT, which was managed with initiation of amiodarone infusion. EP service was consulted by admitting physician for evaluation and management of PVCs/NSVT. A 12 lead showed simlar morphology of NSVT to PVCs. Underwent a cardiac MRI that suggested idiopathic VT. After review PVC management options (AA v ablation), including indications, material risks, and benefits of each with patient and , she desired ablation, but no availability until 5/24/22, so Amiodarone changed to flecainide and metoprolol, then discharged with plan for ablation on 5/24/22.  VE burden noted to significantly decrease with AA. She presents today, 22; for PVC ablation. She reports intermittent episodes of palpitations. She denies any other complaints at this time. Prior cardiac testing:  · Cardiac MRI (2022): Normal LV size and function, LVEF 51%, normal RV size/function, no evidence of intra myocardial fibrosis, inflammatory patterns infiltrative disease or prior infarct. · Exercise nuclear stress test (2021): LVEF = 67%, normal perfusion with the exception of attenuation artifact due to soft tissue/breast attenuation at 85% of APMHR, perfusion, TID = 1.17, and increasing monomorphic PVCs with exertion and reduction in PVC burden with recovery, and average exercise capacity.     Past Medical History:   Diagnosis Date    Celiac disease     Palpitations     Polymyositis ([address]      Past Surgical History:   Procedure Laterality Date    HERNIA REPAIR      HYSTERECTOMY      LUMBAR LAMINECTOMY  ,     THYROIDECTOMY, COMPLETION        Family History   Problem Relation Age of Onset    Diabetes Mother     Heart Disease Mother     Cancer Father     Cancer Sister      There is no family history of sudden cardiac arrest    Social History     Tobacco Use    Smoking status: Former Smoker     Quit date: 3/15/1977     Years since quittin.2    Smokeless tobacco: Never Used   Substance Use Topics    Alcohol use: No       Current Outpatient Medications   Medication Sig Dispense Refill    flecainide (TAMBOCOR) 100 MG tablet Take 1 tablet by mouth every 12 hours 60 tablet 3    metoprolol succinate (TOPROL XL) 50 MG extended release tablet Take 1 tablet by mouth daily 30 tablet 3    vitamin B-12 (CYANOCOBALAMIN) 500 MCG tablet Take 1 tablet by mouth daily 30 tablet 0    calcium carbonate (OSCAL) 500 MG TABS tablet Take 500 mg by mouth 2 times daily      metFORMIN (GLUCOPHAGE-XR) 500 MG extended release tablet Take 500 mg by mouth 2 times daily      LANTUS SOLOSTAR 100 UNIT/ML injection pen Inject 15 Units into the skin nightly       rosuvastatin (CRESTOR) 10 MG tablet Take 10 mg by mouth nightly       predniSONE (DELTASONE) 5 MG tablet Take 5 mg by mouth daily       azaTHIOprine (IMURAN) 50 MG tablet Take 50 mg by mouth 2 times daily      levothyroxine (SYNTHROID) 100 MCG tablet Take 100 mcg by mouth Six times weekly Given Monday,Tuesday,Wednesday,Thursday,Friday,Saturday      famotidine (PEPCID) 20 MG tablet Take 20 mg by mouth daily       pantoprazole (PROTONIX) 40 MG tablet Take 40 mg by mouth daily      vitamin D (CHOLECALCIFEROL) 1000 UNITS TABS tablet Take 1,000 Units by mouth daily        No current facility-administered medications for this encounter. Allergies   Allergen Reactions    Sulfa Antibiotics Hives    Tetracyclines & Related Hives     ROS:   Constitutional: Negative for fever, activity change and appetite change. HENT: Negative for epistaxis. Eyes: Negative for diploplia, blurred vision. Respiratory: Negative for cough, chest tightness, shortness of breath and wheezing. Cardiovascular: pertinent positives in HPI  Gastrointestinal: Negative for abdominal pain and blood in stool. Genitourinary: Negative for hematuria and difficulty urinating. Musculoskeletal: Negative for myalgias and gait problem. Skin: Negative for color change and rash. Neurological: Negative for syncope and light-headedness. Psychiatric/Behavioral: Negative for confusion and agitation. The patient is not nervous/anxious. Heme: no bleeding disorders, no melena or hematochezia  All other review of systems are negative     PHYSICAL EXAM:  BP (!) 156/67   Pulse 65   Temp 97.7 °F (36.5 °C) (Tympanic)   Resp 12   Ht 5' 8\" (1.727 m)   Wt 146 lb (66.2 kg)   SpO2 96%   BMI 22.20 kg/m²    Constitutional: Well-developed, no acute distress, well groomed examined in room with family at bedside.    Eyes: conjunctivae normal, no xanthelasma   Ears, Nose, Throat: mask, oral mucosa moist, no cyanosis Neck: supple, no JVD, no thyromegaly   CV: normal rate, regular rhythm, Peripheral pulses normal including bilateral radial and pedal pulses are normal in quality  Lungs: normal respiratory effort without used of accessory muscles, no audible wheezes  Abdomen: soft, non-tender, no masses or hepatomegaly   Extremities: no digital clubbing, no edema   Skin: warm, no rashes   Neuro/Psych: A&O x 3, normal mood and affect    Data:    Recent Labs     05/24/22  0645   WBC 6.2   HGB 14.4   HCT 43.3        Recent Labs     05/24/22  0645      K 3.6      CO2 27   BUN 19   CREATININE 0.7   CALCIUM 9.3     No results for input(s): INR in the last 72 hours. No results for input(s): TSH in the last 72 hours. Lab Results   Component Value Date    MG 1.9 05/24/2022        Assessment/plan:  1. PVCs/NSVT  -Idiopathic.  - Nuclear stress test in 2021 was reportedly low risk. Frequent PVCs noted during exertion, which appear to be RCC origin based on morphology on twelve-lead ECG. -Cardiac MRI 05/04/2022 that showed no intracardial fibrosis, inflammation, infiltrative disease or prior infarct.  -Refractory to acebutolol. After review PVC management options (AA v ablation), including indications, material risks, and benefits of each with patient and , she desired ablation, but no availability until 5/24/22, so Amiodarone changed to flecainide and metoprolol, then discharged with plan for ablation on 5/24/22. VE burden noted to significantly decrease with AA. -She presents today, 5/24/22; for PVC ablation. Thank you for allowing me to participate in your patient's care. Please call me if there are any questions.       Mary Izaguirre, DO   Cardiac Electrophysiology  [facility]  [facility]) Physicians

## 2022-05-24 NOTE — PATIENT CARE CONFERENCE
P Quality Flow/Interdisciplinary Rounds Progress Note        Quality Flow Rounds held on May 24, 2022    Disciplines Attending:  Bedside Nurse, ,  and Nursing Unit Maria Eugenia Guthrie was admitted on 5/24/2022 12:09 PM    Anticipated Discharge Date:       Disposition:    Tyler Score:  Tyler Scale Score: 22    Readmission Risk              Risk of Unplanned Readmission:  0           Discussed patient goal for the day, patient clinical progression, and barriers to discharge.   The following Goal(s) of the Day/Commitment(s) have been identified:  Diagnostics - Report Results      Fady Galvez RN  May 24, 2022
Adequate: hears normal conversation without difficulty

## 2022-05-24 NOTE — OP NOTE
Operative Note      Patient: Ofelia Barker  YOB: 1950  MRN: [id number]    Date of Procedure: 5/24/2022    Patient History: Ofelia Barker is a 80 y.o. female with a history of PVCs, polymyositis, DM, hypothyroidism, celiac disease, and osteoporosis. She is managed by Dr. Abdoulaye Urbano with at flecainide 100 mg BID, metoprolol XL 50 mg daily, crestor 10 mg daily, and PPI. Her medications also include azathioprine and prednisone, which were prescribed for her polymyositis. In approximately 1980, patient reports she was diagnosed with PVCs, which were treated with acebutolol, which resolved symptoms of palpitations. In 2021, she establish cardiology care with Dr. Abdoulaye Urbano. She complained of exertional chest pain at that time, so exercise nuclear stress test was performed. Stress test reported no ischemia and normal LVEF, but did note frequent PVCs during exertion (on review of telemetry strips, site of origin is suspected to be RCC). No changes in management were made at that time. On 5/3/2022, she presented with chief complaint of palpitations. On telemetry she was noted to have frequent PVCs as well as episodes of nonsustained VT, which was managed with initiation of amiodarone infusion. EP service was consulted by admitting physician for evaluation and management of PVCs/NSVT. A 12 lead showed simlar morphology of NSVT to PVCs. Underwent a cardiac MRI that suggested idiopathic VT. After review PVC management options (AA v ablation), including indications, material risks, and benefits of each with patient and , she desired ablation, but no availability until 5/24/22, so Amiodarone changed to flecainide and metoprolol, then discharged with plan for ablation on 5/24/22. VE burden noted to significantly decrease with AA. She presents today, 5/24/22; for PVC ablation. She reports intermittent episodes of palpitations. She denies any other complaints at this time.      Pre-Op Diagnosis: PVC, NSVT    Post-Op Diagnosis: Same       Procedure:  -Intracardiac electrophysiologic 3-D mapping (list separately in addition to code for primary procedure) (CPT code: 66305 )  -IV isoproterenol infusion during a diagnostic EP study to induce an arrhythmia (CPT code: 27703)    Surgeon: Alexander Martini DO     Assistant: None    Anesthesia: monitored anesthesia care, see separate Anesthesia note    Estimated Blood Loss (mL): < 50 mL    Complications: progression of trace pericardial effusion to small pericardial effusion observed after 3D electro-anatomic mapping    Detailed Description of Procedure:   Verbal and written informed consent obtained from the patient and placed in the chart. The patient was brought to the EP lab in a fasting state with sinus rhythm and no ventricular ectopy as the presenting rhythm. Isoproterenol infusion initiated at 2 mcg/min without significant ventricular ectopy observed, so increased to 4 mcg/min, which provoked monomorphic PVCs/NSVT (4-5 beats). Moderate sedation/analgesia/conscious sedation was administered per Anesthesia providers with short acting sedative propofol in order to avoid PVB suppression, which can occur with deeper levels of sedation and use of longer acting sedatives that suppress sympathetic tone. The right and left groin sites were prepared with chlorhexidine gluconate and draped in a sterile fashion. Ultrasound was used to guide central venous access in order to reduce vascular access complications and time to gain access. Using a modified Seldinger technique, a  63 cm 8.5 F braided transseptal SR-0 sheath and 8 Fr short locking sheath were placed in the right femoral vein and a 9 Fr short sheath and a 7 German short sheath were placed in the left femoral vein under ultrasound guidance.  The 8 Fr sheath was used to introduce the 7F [employer] CS bidirectional DF curve decapolar catheter, which was then advanced into the right atrium (RA) and positioned in the coronary sinus. The atrial activation pattern was consistent with sinus rhythm. Cannulating CS was somewhat difficult as patient's CS ostium was fairly vertical.  The 9 F sheath was used to introduce 8F 90 cm [employer] Youngblood SOUNDSTAR intracardiac echocardiography (ICE) catheter, which was then advanced into the RA. Baseline images, including interatrial septum, were obtained and used to create a 3D reconstruction of the aortic valve via [employer]. A patent foramen ovale was not observed on ICE imaging. Pericardial effusion was observed and noted to be trace in size. A 6 F [employer] Youngblood Hexapolar IVC electrode A curve catheter was positioned in the RV apex via the 7 F short sheath. The SR-0 was used to introduce the [address] F curve into the RV. A 3D electroanatomic map of the RV was obtained. The earliest site of activation was noted to be at anterior RVOT, which was 45 msec pre-QRS with QRS unipolar morphology. The Entomo catheter was removed in preparation for introduction of ablation catheter. Prior to introducing ablation catheter, ice images repeat checked. Patient's trace pericardial effusion at the beginning of the procedure was noted to have progressed in size, now small. Vital signs remained stable. Decision was then made to not pursue ablation due to progression in pericardial effusion. Isoproterenol infusion discontinued, which resulted to suppression of VE. All catheters except for ice catheter were then removed. Ice images were reassessed and noted to be stable. Ice catheter then removed. Sheaths removed and hemostasis achieved with Abbott Perclose pro style device (1 at each access site) and manual pressure. Summary: Presented in sinus rhythm without ventricular ectopy. Ventricular ectopy easily induced with isoproterenol infusion. Ventricular ectopy was monomorphic in appearance.   3D electroanatomic map revealed site of origin to be anterior RVOT (45 ms pre-QRS, QS unipolar signal). After 3D electroanatomic map, ice images revealed progression of baseline trace pericardial effusion to small pericardial effusion. Catheters removed except for ice and pericardial effusion noted to be stable in size. Vital signs remained stable throughout the procedure. Ablation not pursued due to pericardial effusion. Stat TTE revealed small, circumferential pericardial effusion without evidence of tamponade physiology (see separate report). Plan to admit overnight for observation with repeat limited TTE this afternoon and potentially tomorrow. If progression in pericardial effusion or development of hemodynamic instability, will require pericardial drain by interventional cardiology. If no progression of pericardial effusion or development of hemodynamic instability, anticipate discharge 5/25/2022 with 2-week event monitor to reassess VE burden on metoprolol and flecainide. Follow-up in my office in ~1 month.     Electronically signed by Isaías Zelaya DO on 5/24/2022 at 7:45 AM

## 2022-05-25 ENCOUNTER — TELEPHONE (OUTPATIENT)
Dept: NON INVASIVE DIAGNOSTICS | Age: 72
End: 2022-05-25

## 2022-05-25 VITALS
DIASTOLIC BLOOD PRESSURE: 53 MMHG | BODY MASS INDEX: 22.13 KG/M2 | HEIGHT: 68 IN | HEART RATE: 73 BPM | OXYGEN SATURATION: 97 % | WEIGHT: 146 LBS | RESPIRATION RATE: 16 BRPM | SYSTOLIC BLOOD PRESSURE: 113 MMHG | TEMPERATURE: 98.4 F

## 2022-05-25 DIAGNOSIS — I47.29 NSVT (NONSUSTAINED VENTRICULAR TACHYCARDIA): Primary | ICD-10-CM

## 2022-05-25 PROBLEM — I31.39 PERICARDIAL EFFUSION: Status: ACTIVE | Noted: 2022-05-25

## 2022-05-25 LAB
ALBUMIN SERPL-MCNC: 3.7 G/DL (ref 3.5–5.2)
ALP BLD-CCNC: 41 U/L (ref 35–104)
ALT SERPL-CCNC: 14 U/L (ref 0–32)
ANION GAP SERPL CALCULATED.3IONS-SCNC: 10 MMOL/L (ref 7–16)
AST SERPL-CCNC: 18 U/L (ref 0–31)
BILIRUB SERPL-MCNC: 0.4 MG/DL (ref 0–1.2)
BUN BLDV-MCNC: 15 MG/DL (ref 6–23)
CALCIUM SERPL-MCNC: 8.1 MG/DL (ref 8.6–10.2)
CHLORIDE BLD-SCNC: 104 MMOL/L (ref 98–107)
CO2: 27 MMOL/L (ref 22–29)
CREAT SERPL-MCNC: 0.7 MG/DL (ref 0.5–1)
GFR AFRICAN AMERICAN: >60
GFR NON-AFRICAN AMERICAN: >60 ML/MIN/1.73
GLUCOSE BLD-MCNC: 104 MG/DL (ref 74–99)
HCT VFR BLD CALC: 36.3 % (ref 34–48)
HEMOGLOBIN: 11.9 G/DL (ref 11.5–15.5)
MCH RBC QN AUTO: 33.8 PG (ref 26–35)
MCHC RBC AUTO-ENTMCNC: 32.8 % (ref 32–34.5)
MCV RBC AUTO: 103.1 FL (ref 80–99.9)
PDW BLD-RTO: 12.7 FL (ref 11.5–15)
PLATELET # BLD: 237 E9/L (ref 130–450)
PMV BLD AUTO: 9.6 FL (ref 7–12)
POTASSIUM REFLEX MAGNESIUM: 4 MMOL/L (ref 3.5–5)
RBC # BLD: 3.52 E12/L (ref 3.5–5.5)
SODIUM BLD-SCNC: 141 MMOL/L (ref 132–146)
TOTAL PROTEIN: 5.9 G/DL (ref 6.4–8.3)
WBC # BLD: 6.3 E9/L (ref 4.5–11.5)

## 2022-05-25 PROCEDURE — 85027 COMPLETE CBC AUTOMATED: CPT

## 2022-05-25 PROCEDURE — 99217 PR OBSERVATION CARE DISCHARGE MANAGEMENT: CPT | Performed by: INTERNAL MEDICINE

## 2022-05-25 PROCEDURE — 80053 COMPREHEN METABOLIC PANEL: CPT

## 2022-05-25 PROCEDURE — 6360000002 HC RX W HCPCS: Performed by: NURSE PRACTITIONER

## 2022-05-25 PROCEDURE — 93242 EXT ECG>48HR<7D RECORDING: CPT

## 2022-05-25 PROCEDURE — 93308 TTE F-UP OR LMTD: CPT

## 2022-05-25 PROCEDURE — 36415 COLL VENOUS BLD VENIPUNCTURE: CPT

## 2022-05-25 PROCEDURE — G0378 HOSPITAL OBSERVATION PER HR: HCPCS

## 2022-05-25 PROCEDURE — 6370000000 HC RX 637 (ALT 250 FOR IP): Performed by: NURSE PRACTITIONER

## 2022-05-25 RX ORDER — METOPROLOL SUCCINATE 50 MG/1
50 TABLET, EXTENDED RELEASE ORAL DAILY
Status: DISCONTINUED | OUTPATIENT
Start: 2022-05-25 | End: 2022-05-25 | Stop reason: HOSPADM

## 2022-05-25 RX ADMIN — FLECAINIDE ACETATE 100 MG: 100 TABLET ORAL at 10:34

## 2022-05-25 RX ADMIN — LEVOTHYROXINE SODIUM 100 MCG: 100 TABLET ORAL at 05:40

## 2022-05-25 RX ADMIN — METFORMIN HYDROCHLORIDE 500 MG: 500 TABLET, EXTENDED RELEASE ORAL at 10:33

## 2022-05-25 RX ADMIN — Medication 1000 UNITS: at 10:34

## 2022-05-25 RX ADMIN — CALCIUM CARBONATE 500 MG: 500 TABLET, CHEWABLE ORAL at 10:34

## 2022-05-25 RX ADMIN — FAMOTIDINE 20 MG: 20 TABLET ORAL at 10:35

## 2022-05-25 RX ADMIN — Medication 500 MCG: at 10:34

## 2022-05-25 RX ADMIN — AZATHIOPRINE 50 MG: 50 TABLET ORAL at 10:35

## 2022-05-25 ASSESSMENT — PAIN SCALES - GENERAL: PAINLEVEL_OUTOF10: 0

## 2022-05-25 NOTE — PROGRESS NOTES
Events reviewed with [facility]  Afebrile , vs stable  RRR  Lungs clear  OK for discharge from my standpoint

## 2022-05-25 NOTE — TELEPHONE ENCOUNTER
----- Message from GENIA Chi CNP sent at 5/25/2022 12:04 PM EDT -----  Coloradorosey Lay will need a 14 day monitor at the time of discharge, Grace Tafoya, she is in room 6392 she will also need a follow-up with Dr. Magan Lew in 4-6 weeks in office. Thanks.

## 2022-05-25 NOTE — PROGRESS NOTES
[address] ELECTROPHYSIOLOGY DEPARTMENT/DIVISION OF CARDIOLOGy  Inpatient Progress Note. PATIENT: Shaw Hunt  MEDICAL RECORD NUMBER: [id number]  DATE OF SERVICE:  5/25/2022   ATTENDING ELECTROPHYSIOLOGIST: Josselyn Winn MD  PRIMARY ELECTROPHYSIOLOGIST:  Dhaval Faye DO   REFERRING PHYSICIAN:   Korin Corea MD  CHIEF COMPLAINT: NSVT    HPI: Ofelia Barker is a 80 y.o. female with a history of PVCs, polymyositis, DM, hypothyroidism, celiac disease, and osteoporosis. She is managed by Dr. Abdoulaye Urbano with at flecainide 100 mg BID, metoprolol XL 50 mg daily, crestor 10 mg daily, and PPI. Her medications also include azathioprine and prednisone, which were prescribed for her polymyositis. In approximately 1980, patient reports she was diagnosed with PVCs, which were treated with acebutolol, which resolved symptoms of palpitations. In 2021, she establish cardiology care with Dr. Abdoulaye Urbano. She complained of exertional chest pain at that time, so exercise nuclear stress test was performed. Stress test reported no ischemia and normal LVEF, but did note frequent PVCs during exertion (on review of telemetry strips, site of origin is suspected to be RCC). No changes in management were made at that time. On 5/3/2022, she presented with chief complaint of palpitations. On telemetry she was noted to have frequent PVCs as well as episodes of nonsustained VT, which was managed with initiation of amiodarone infusion. EP service was consulted by admitting physician for evaluation and management of PVCs/NSVT. A 12 lead showed simlar morphology of NSVT to PVCs. Underwent a cardiac MRI that suggested idiopathic VT.  After review PVC management options (AA v ablation), including indications, material risks, and benefits of each with patient and , she desired ablation, but no availability until 5/24/22, so Amiodarone changed to flecainide and metoprolol, then discharged with plan for ablation on 5/24/22. VE burden noted to significantly decrease with AA. She presents today, 5/24/22; for PVC ablation. She reports intermittent episodes of palpitations. She denies any other complaints at this time. 05/25/22: The patient is seen in hospital follow-up, due to the concern for pericardial effusion her planne RF ablation was aborted post EP study revealing anterior RVOT PVCs (43 MS 3 QRS, QS unipolar signal). Post procedure she underwent stat TTE that revealed a small pericardial effusion around 10:45 AM, repeat echocardiogram at 1537 showed a similar effusion, this morning at 0 9:40 AM echocardiogram showed a small circumferential pericardial effusion. She has no additional cardiac complatins. Prior cardiac testing:  · Limited TTE (05/25/2022 @ 0940): Small circumferential pericardial effusion no change from prior study  · Limited TTE (05/24/2022 @ 02210): Small circumferential pericardial effusion similar to previous study  · Limited TTE (05/24/2022 @ 1043): Small circumferential pericardial effusion  · Cardiac MRI (5/4/2022): Normal LV size and function, LVEF 51%, normal RV size/function, no evidence of intra myocardial fibrosis, inflammatory patterns infiltrative disease or prior infarct. · Exercise nuclear stress test (2/2/2021): LVEF = 67%, normal perfusion with the exception of attenuation artifact due to soft tissue/breast attenuation at 85% of APMHR, perfusion, TID = 1.17, and increasing monomorphic PVCs with exertion and reduction in PVC burden with recovery, and average exercise capacity.     Past Medical History:   Diagnosis Date    Celiac disease     Palpitations     Polymyositis ([address]      Past Surgical History:   Procedure Laterality Date    HERNIA REPAIR      HYSTERECTOMY  1992    LUMBAR LAMINECTOMY  1985, 26    THYROIDECTOMY, COMPLETION        Family History   Problem Relation Age of Onset    Diabetes Mother     Heart Disease Mother     Cancer Father    [employer] Cancer Sister      There is no family history of sudden cardiac arrest    Social History     Tobacco Use    Smoking status: Former Smoker     Quit date: 3/15/1977     Years since quittin.2    Smokeless tobacco: Never Used   Substance Use Topics    Alcohol use: No       Current Facility-Administered Medications   Medication Dose Route Frequency Provider Last Rate Last Admin    sodium chloride flush 0.9 % injection 5-40 mL  5-40 mL IntraVENous 2 times per day Ron Vivaro, DO        sodium chloride flush 0.9 % injection 5-40 mL  5-40 mL IntraVENous PRN Ron Healy, DO        0.9 % sodium chloride infusion   IntraVENous PRN Ron Healy, DO        acetaminophen (TYLENOL) tablet 650 mg  650 mg Oral Q4H PRN Ron Healy, DO        perflutren lipid microspheres (DEFINITY) injection 1.65 mg  1.5 mL IntraVENous ONCE PRN Ron Healy, DO        flecainide (TAMBOCOR) tablet 100 mg  100 mg Oral 2 times per day GENIA Rivas CNP   100 mg at 22 1034    azaTHIOprine (IMURAN) tablet 50 mg  50 mg Oral BID GENIA Rivas CNP   50 mg at 22 1035    calcium carbonate (TUMS) chewable tablet 500 mg  500 mg Oral BID GENIA Rivas CNP   500 mg at 22 1034    famotidine (PEPCID) tablet 20 mg  20 mg Oral Daily GENIA Rivas CNP   20 mg at 22 1035    insulin glargine-yfgn (SEMGLEE-YFGN) injection vial 15 Units  15 Units SubCUTAneous Nightly GENIA Rivas CNP        metFORMIN (GLUCOPHAGE-XR) extended release tablet 500 mg  500 mg Oral BID GENIA Rivas - CNP   500 mg at 22 1033    [Held by provider] predniSONE (DELTASONE) tablet 5 mg  5 mg Oral Daily GENIA Rivas CNP        rosuvastatin (CRESTOR) tablet 10 mg  10 mg Oral Nightly GENIA Rivas CNP   10 mg at 22    vitamin B-12 (CYANOCOBALAMIN) tablet 500 mcg  500 mcg Oral Daily Gia Carlson Maricel GENIA - CNP   500 mcg at 05/25/22 1034    vitamin D (CHOLECALCIFEROL) tablet 1,000 Units  1,000 Units Oral Daily Dash Rivas GENIA Eid CNP   1,000 Units at 05/25/22 1034    levothyroxine (SYNTHROID) tablet 100 mcg  100 mcg Oral Once per day on Mon Tue Wed Thu Fri Sat Dash Rivas APRCALEB - CNP   100 mcg at 05/25/22 0540    perflutren lipid microspheres (DEFINITY) injection 1.65 mg  1.5 mL IntraVENous ONCE PRN Nicolle Guo MD        0.9 % sodium chloride infusion   IntraVENous Continuous Roseann Tee, DO 75 mL/hr at 05/24/22 1848 New Bag at 05/24/22 1848      Allergies   Allergen Reactions    Sulfa Antibiotics Hives    Tetracyclines & Related Hives     ROS:   Constitutional: Negative for fever, activity change and appetite change. HENT: Negative for epistaxis. Eyes: Negative for diploplia, blurred vision. Respiratory: Negative for cough, chest tightness, shortness of breath and wheezing. Cardiovascular: pertinent positives in HPI  Gastrointestinal: Negative for abdominal pain and blood in stool. Genitourinary: Negative for hematuria and difficulty urinating. Musculoskeletal: Negative for myalgias and gait problem. Skin: Negative for color change and rash. Neurological: Negative for syncope and light-headedness. Psychiatric/Behavioral: Negative for confusion and agitation. The patient is not nervous/anxious. Heme: no bleeding disorders, no melena or hematochezia  All other review of systems are negative     PHYSICAL EXAM:  BP (!) 113/53   Pulse 73   Temp 98.4 °F (36.9 °C) (Temporal)   Resp 16   Ht 5' 8\" (1.727 m)   Wt 146 lb (66.2 kg)   SpO2 97%   BMI 22.20 kg/m²    Constitutional: Well-developed, no acute distress, well groomed examined in room with family at bedside.    Eyes: conjunctivae normal, no xanthelasma   Ears, Nose, Throat: mask, oral mucosa moist, no cyanosis   Neck: supple, no JVD, no thyromegaly   CV: normal rate, regular rhythm, Peripheral pulses normal including bilateral radial and pedal pulses are normal in quality  Lungs: normal respiratory effort without used of accessory muscles, no audible wheezes  Abdomen: soft, non-tender, no masses or hepatomegaly   Extremities: no digital clubbing, no edema   Skin: warm, no rashes   Neuro/Psych: A&O x 3, normal mood and affect    Data:    Recent Labs     05/24/22  0645 05/25/22  0554   WBC 6.2 6.3   HGB 14.4 11.9   HCT 43.3 36.3    237     Recent Labs     05/24/22  0645 05/25/22  0554    141   K 3.6 4.0    104   CO2 27 27   BUN 19 15   CREATININE 0.7 0.7   CALCIUM 9.3 8.1*     No results for input(s): INR in the last 72 hours. No results for input(s): TSH in the last 72 hours. Lab Results   Component Value Date    MG 1.9 05/24/2022        Assessment/plan:  1. PVCs/NSVT  -Idiopathic.  - Nuclear stress test in 2021 was reportedly low risk. Frequent PVCs noted during exertion, which appear to be RCC origin based on morphology on twelve-lead ECG. -Cardiac MRI 05/04/2022 that showed no intracardial fibrosis, inflammation, infiltrative disease or prior infarct.  -Refractory to acebutolol. After review PVC management options (AA v ablation), including indications, material risks, and benefits of each with patient and , she desired ablation, but no availability until 5/24/22, so Amiodarone changed to flecainide and metoprolol, then discharged with plan for ablation on 5/24/22. VE burden noted to significantly decrease with AA. -Plan PVC ablation 05/24/2022 aborted due to small pericardial effusion, EP study results showed anterior RVOT (45 ms pre-QRS, QS unipolar signal). Ice images revealed progression to small pericardial effusion and ablation was not pursued. - Post EP study Effusion was noted to be small and stable on 3 consecutive echos. 2. Polymyositis    3. Diabetes mellitus    4. Hypothyroidism    5. Celiac's disease    6. Osteoporosis    Plan:  1.   Okay for discharge today. 2.  Continue flecainide 100 mg every 12 hours  3. Continue Toprol 50 mg daily  4. ZIO XT monitor for 14 days at time of discharge  5. Will need office follow-up in 4-6 weeks with Dr. Vaibhav Whaley. Thank you for allowing me to participate in your patient's care. Please call me if there are any questions. Discussed with Dr. Mae Maurice. Katia Gonzalez, APRN - CNP   Cardiac Electrophysiology  [address] [place] [address] Physicians    Attending Physician's Statement    Patient seen with the ANP. Agree with the findings, assessment and plan. Management plan was discussed. I have personally interviewed the patient, independently performed a focused cardiac examination, reviewed the pertinent laboratory and diagnostic testing with the patient and directly participated in the medical decision-making as noted above with the following additions:     Denies any cardiac symptoms. Remains in NSR with PVCs and ventricular triplets overnight. Physical exam showed no JVD, RRR, normal S1S2, no murmur, clear lungs bilaterally, no hematoma of both groins    Continue Flecainide and Toprol XL. Will discharge home with 14 day cardiac monitor. Follow up with Dr. Viabhav Whaley in 1 month. I have spent a total of 35 minutes with the patient and the family reviewing the above stated recommendations. And a total of >50% of that time involved face-to-face time providing counseling and/or coordination of care with the other providers, reviewing records/tests, counseling/education of the patient, ordering medications/tests/procedures, coordinating care, and documenting clinical information in the EHR.     Mely Newell MD  Cardiac Electrophysiology  [address]   [facility] [place]: [place] [facility]  3:41 PM  5/25/2022

## 2022-05-25 NOTE — PROGRESS NOTES
Discharged to home. Goals met. Discharge instructions given to Pt and . Monitor removed and placed in nurses station.

## 2022-05-25 NOTE — DISCHARGE SUMMARY
[address]  [address] [facility] [address] [facility]  Discharge Summary  Patient: [address]  Medical Record Number: [id number]  Date of Procedure:  5/25/2022  Operating Electrophysiologist: Jared Dillon DO  Primary Electrophysiologist: Jared Dillon DO   Referring Physician: Tucker Godoy MD     Admission Date:5/24/2022      Discharge Date:  05/25/22    Patient Active Problem List   Diagnosis    Osteoporosis    V-tach [facility])    NSVT (nonsustained ventricular tachycardia) (HCC)    PVC (premature ventricular contraction)    VT (ventricular tachycardia) (HCC)    Pericardial effusion          Medication List      CONTINUE taking these medications    calcium carbonate 500 MG Tabs tablet  Commonly known as: OSCAL     Crestor 10 MG tablet  Generic drug: rosuvastatin     famotidine 20 MG tablet  Commonly known as: PEPCID     flecainide 100 MG tablet  Commonly known as: TAMBOCOR  Take 1 tablet by mouth every 12 hours     Imuran 50 mg tablet  Generic drug: azaTHIOprine     Lantus SoloStar 100 UNIT/ML injection pen  Generic drug: insulin glargine     levothyroxine 100 MCG tablet  Commonly known as: SYNTHROID     metFORMIN 500 mg extended release tablet  Commonly known as: GLUCOPHAGE-XR     metoprolol succinate 50 MG extended release tablet  Commonly known as: TOPROL XL  Take 1 tablet by mouth daily     pantoprazole 40 MG tablet  Commonly known as: PROTONIX     predniSONE 5 MG tablet  Commonly known as: DELTASONE     vitamin B-12 500 MCG tablet  Commonly known as: CYANOCOBALAMIN  Take 1 tablet by mouth daily     vitamin D 25 MCG (1000 UT) Tabs tablet  Commonly known as: CHOLECALCIFEROL            Hospital Course: [address] is a 80 y.o. female with a history of PVCs, polymyositis, DM, hypothyroidism, celiac disease, and osteoporosis. She is managed by Daren Mendoza with at flecainide 100 mg BID, metoprolol XL 50 mg daily, crestor 10 mg daily, and PPI.  Her medications also include azathioprine and prednisone, which were prescribed for her polymyositis. In approximately 1980, patient reports she was diagnosed with PVCs, which were treated with acebutolol, which resolved symptoms of palpitations. In 2021, she establish cardiology care with Dr. Sonal Nelson. She complained of exertional chest pain at that time, so exercise nuclear stress test was performed. Stress test reported no ischemia and normal LVEF, but did note frequent PVCs during exertion (on review of telemetry strips, site of origin is suspected to be RCC). No changes in management were made at that time. On 5/3/2022, she presented with chief complaint of palpitations. On telemetry she was noted to have frequent PVCs as well as episodes of nonsustained VT, which was managed with initiation of amiodarone infusion. EP service was consulted by admitting physician for evaluation and management of PVCs/NSVT. A 12 lead showed simlar morphology of NSVT to PVCs. Underwent a cardiac MRI that suggested idiopathic VT. After review PVC management options (AA v ablation), including indications, material risks, and benefits of each with patient and , she desired ablation, but no availability until 5/24/22, so Amiodarone changed to flecainide and metoprolol, then discharged with plan for ablation on 5/24/22. VE burden noted to significantly decrease with AA. She presents today, 5/24/22; for PVC ablation. Due to the concern for pericardial effusion her planne RF ablation was aborted post EP study revealing anterior RVOT PVCs (43 MS 3 QRS, QS unipolar signal). Post procedure she underwent stat TTE that revealed a small pericardial effusion around 10:45 AM, repeat echocardiogram at 1537 showed a similar effusion, this morning at 0 9:40 AM echocardiogram showed a small circumferential pericardial effusion.  She has no additional cardiac complatins    Procedures Performed 05/24/22:   -Intracardiac electrophysiologic 3-D mapping (list separately in addition to code for primary procedure) (CPT code: 97935 )  -IV isoproterenol infusion during a diagnostic EP study to induce an arrhythmia (CPT code: 68901)    Consultants: None    Disposition: The patient was discharged to home in stable condition on the above medications. Clinical follow-up in the office in 4-6 weeks. Discussed with Dr. Danilo Bach. GENIA Mitchell - CNP  Cardiac Electrophysiology  [facility]  [facility] [place]: [place] [facility]  12:11 PM  5/25/2022     Attending Physician's Statement    Patient seen with the ANP. Agree with the findings, assessment and plan. Management plan was discussed. I have personally interviewed the patient, independently performed a focused cardiac examination, reviewed the pertinent laboratory and diagnostic testing with the patient and directly participated in the medical decision-making as noted above with the following additions:     Denies any cardiac symptoms. Remains in NSR with PVCs and ventricular triplets overnight. Physical exam showed no JVD, RRR, normal S1S2, no murmur, clear lungs bilaterally, no hematoma of both groins    Continue Flecainide and Toprol XL. Will discharge home with 14 day cardiac monitor. Follow up with Dr. Danilo Bach in 1 month. I have spent a total of 35 minutes with the patient and the family reviewing the above stated recommendations. And a total of >50% of that time involved face-to-face time providing counseling and/or coordination of care with the other providers, reviewing records/tests, counseling/education of the patient, ordering medications/tests/procedures, coordinating care, and documenting clinical information in the EHR.     Carli Guillen MD  Cardiac Electrophysiology  [address]  [facility] [place]: [place] [facility]  3:41 PM  5/25/2022

## 2022-05-26 ENCOUNTER — TELEPHONE (OUTPATIENT)
Dept: NON INVASIVE DIAGNOSTICS | Age: 72
End: 2022-05-26

## 2022-05-26 NOTE — TELEPHONE ENCOUNTER
Pt called to see if she needs a one week ekg. Pt adv she does not need a one week ekg due to procedure not being abot to be completed. Pt adv to follow up in 4-6 weeks with Dr. Kye Soliman. Pt is scheduled.        Electronically signed by Paige Dao MA on 5/26/2022 at 8:39 AM

## 2022-05-31 ENCOUNTER — TELEPHONE (OUTPATIENT)
Dept: CARDIAC CATH/INVASIVE PROCEDURES | Age: 72
End: 2022-05-31

## 2022-05-31 NOTE — TELEPHONE ENCOUNTER
I called [facility] to check on her after her procedure last Tuesday. She says she has a lump the size of a walnut under her right groin site, she says it has been that way since the procedure it is no better or no worse. She also says she has had a couple bouts of arrhythmia's she has a zio xt on and has hit the button during these episodes. She says they have occurred towards the end of the 12 hours between her medications. She denies any sob or dizziness. Will notify Dr. Ivis Anne of this. She other wise is doing well, just a little tired.

## 2022-06-01 ENCOUNTER — TELEPHONE (OUTPATIENT)
Dept: NON INVASIVE DIAGNOSTICS | Age: 72
End: 2022-06-01

## 2022-06-01 DIAGNOSIS — I82.4Y1 DEEP VEIN THROMBOSIS (DVT) OF PROXIMAL VEIN OF RIGHT LOWER EXTREMITY, UNSPECIFIED CHRONICITY (HCC): Primary | ICD-10-CM

## 2022-06-01 NOTE — TELEPHONE ENCOUNTER
----- Message from Cain Raines DO sent at 5/31/2022 10:54 PM EDT -----  Regarding: US groin  [facility]  -Cain Raines DO   ----- Message -----  From: Mena Montgomery RN  Sent: 5/31/2022   2:33 PM EDT  To: Cain Raines DO    She has a fu with you 6/29/22 at 10:15 am

## 2022-06-01 NOTE — TELEPHONE ENCOUNTER
Spoke with patient let her know recommendations and scheduled appointment time for US. She verbalized understanding.

## 2022-06-07 ENCOUNTER — HOSPITAL ENCOUNTER (OUTPATIENT)
Dept: ULTRASOUND IMAGING | Age: 72
Discharge: HOME | End: 2022-06-09
Payer: COMMERCIAL

## 2022-06-07 DIAGNOSIS — I82.4Y1 DEEP VEIN THROMBOSIS (DVT) OF PROXIMAL VEIN OF RIGHT LOWER EXTREMITY, UNSPECIFIED CHRONICITY (HCC): ICD-10-CM

## 2022-06-07 PROCEDURE — 93971 EXTREMITY STUDY: CPT

## 2022-06-07 PROCEDURE — 93971 EXTREMITY STUDY: CPT | Performed by: RADIOLOGY

## 2022-06-15 DIAGNOSIS — I49.3 PVC (PREMATURE VENTRICULAR CONTRACTION): ICD-10-CM

## 2022-06-20 ENCOUNTER — TELEPHONE (OUTPATIENT)
Dept: NON INVASIVE DIAGNOSTICS | Age: 72
End: 2022-06-20

## 2022-06-20 RX ORDER — METOPROLOL SUCCINATE 50 MG/1
50 TABLET, EXTENDED RELEASE ORAL 2 TIMES DAILY
Qty: 180 TABLET | Refills: 3 | Status: SHIPPED
Start: 2022-06-20 | End: 2022-10-19 | Stop reason: SDUPTHER

## 2022-06-20 NOTE — TELEPHONE ENCOUNTER
Spoke with patient let her know results and recommendations. She verbalized understanding, script sent to pharmacy.

## 2022-06-20 NOTE — TELEPHONE ENCOUNTER
----- Message from Rhona Flores, DO sent at 6/17/2022  8:13 PM EDT -----  Regarding: event monitor  Event monitor with symptoms during PVC and NSVT. VE burden ~5%. Recommend:  1. Increase metoprolol XL from 50 mg daily to 50 mg BID.     Rhona Flores, DO

## 2022-06-24 NOTE — PROGRESS NOTES
[address] ELECTROPHYSIOLOGY DEPARTMENT/DIVISION OF CARDIOLOGy  Outpatient Progress Note. PATIENT: Jennifer Hunt  MEDICAL RECORD NUMBER: [id number]  DATE OF SERVICE:  6/24/2022   ATTENDING ELECTROPHYSIOLOGIST: Sandra Cross MD  PRIMARY ELECTROPHYSIOLOGIST:  Waldo Borges DO   REFERRING PHYSICIAN:   Sary Gonzales MD  CHIEF COMPLAINT: NSVT    HPI: Renan Lion is a 80 y.o. 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 She is managed by Dr. Denver Fulling with at flecainide 100 mg BID, metoprolol XL 50 mg every 12 hours, crestor 10 mg daily, and PPI. Her medications also include azathioprine and prednisone, which were prescribed for her polymyositis. In approximately 1980, patient reports she was diagnosed with PVCs, which were treated with acebutolol, which resolved symptoms of palpitations. In 2021, she establish cardiology care with Dr. Denver Fulling. She complained of exertional chest pain at that time, so exercise nuclear stress test was performed. Stress test reported no ischemia and normal LVEF, but did note frequent PVCs during exertion (on review of telemetry strips, site of origin is suspected to be RCC). No changes in management were made at that time. On 5/3/2022, she presented with chief complaint of palpitations. On telemetry she was noted to have frequent PVCs as well as episodes of nonsustained VT, which was managed with initiation of amiodarone infusion. EP service was consulted by admitting physician for evaluation and management of PVCs/NSVT. A 12 lead showed simlar morphology of NSVT to PVCs. Underwent a cardiac MRI that suggested idiopathic VT.  After review PVC management options (AA v ablation), including indications, material risks, and benefits of each with patient and , she desired ablation, but no availability until 5/24/22, so Amiodarone changed to flecainide and metoprolol, then discharged with plan for ablation on 5/24/22. VE burden noted to significantly decrease with AA. On 5/24/22, PVC mapped to anterior RVOT (43 MS 3 QRS, QS unipolar signal). Prior to ablation, patient noted to have new pericardial effusion without hemodynamical instability, so ablation not pursued. Serial TTE reported stable small circumferential pericardial effusion without tamponade. She was discharged on flecainide/metoprolol with a rhythm monitor, which reported VE burden of 5.2% and episodes of NSVT (longest: 3.2 seconds, fastest: 194 bpm) associated with palpitations. She presents today, 6/29/2022; for follow-up with my office. Reports breakthrough palpitations particularly during the evening hours prior to evening dose of flecainide/metoprolol. She denies any other complaints at this time. Prior cardiac testing:  · 14 day Zio XT: 6/15/2022: Predominant underlying rhythm was Sinus Rhythm. 418 Ventricular Tachycardia runs occurred, the run with the longest lasting 6 beats. 4 Supraventricular Tachycardia runs occurred, the run with the longest interval lasting 8 beats. Isolated SVEs were rare (<1.0%), SVE Couplets were rare (<1.0%), and SVE Triplets were rare (<1.0%). Isolated VEs were frequent (5.2%, [id number]), VE Couplets were rare (<1.0%, 3607), and VE Triplets were rare (<1.0%, 2179). Ventricular Bigeminy and Trigeminy were present. Triggered events and reported symptoms were correlated with sinus rhythm, PVCs and NSVTs.   · Limited TTE (05/25/2022 @ 0940): Small circumferential pericardial effusion no change from prior study  · Limited TTE (05/24/2022 @ 87960): Small circumferential pericardial effusion similar to previous study  · Limited TTE (05/24/2022 @ 1043): Small circumferential pericardial effusion  · Cardiac MRI (5/4/2022): Normal LV size and function, LVEF 51%, normal RV size/function, no evidence of intra myocardial fibrosis, inflammatory mcg by mouth Six times weekly Given Monday,Tuesday,Wednesday,Thursday,Friday,Saturday      famotidine (PEPCID) 20 MG tablet Take 20 mg by mouth daily       pantoprazole (PROTONIX) 40 MG tablet Take 40 mg by mouth daily      vitamin D (CHOLECALCIFEROL) 1000 UNITS TABS tablet Take 1,000 Units by mouth daily        No current facility-administered medications for this visit. Allergies   Allergen Reactions    Sulfa Antibiotics Hives    Tetracyclines & Related Hives     ROS:   Constitutional: Negative for fever, activity change and appetite change. HENT: Negative for epistaxis. Eyes: Negative for diploplia, blurred vision. Respiratory: Negative for cough, chest tightness, shortness of breath and wheezing. Cardiovascular: pertinent positives in HPI  Gastrointestinal: Negative for abdominal pain and blood in stool. Genitourinary: Negative for hematuria and difficulty urinating. Musculoskeletal: Negative for myalgias and gait problem. Skin: Negative for color change and rash. Neurological: Negative for syncope and light-headedness. Psychiatric/Behavioral: Negative for confusion and agitation. The patient is not nervous/anxious. Heme: no bleeding disorders, no melena or hematochezia  All other review of systems are negative     PHYSICAL EXAM:  /76 (Site: Left Upper Arm, Position: Sitting, Cuff Size: Medium Adult)   Pulse 62   Resp 18   Ht 5' 8\" (1.727 m)   Wt 148 lb 12.8 oz (67.5 kg)   BMI 22.62 kg/m²    Constitutional: Well-developed, no acute distress, well groomed examined in room with family at bedside.    Eyes: conjunctivae normal, no xanthelasma   Ears, Nose, Throat: mask, oral mucosa moist, no cyanosis   Neck: supple, no JVD, no thyromegaly   CV: normal rate, regular rhythm, no m/r/g, Peripheral pulses normal including bilateral radial and pedal pulses are normal in quality  Lungs: BS+ b/l, no w/r/r/c, normal respiratory effort without used of accessory muscles, no audible wheezes  Abdomen: soft, non-tender, no masses or hepatomegaly   Extremities: no digital clubbing, no edema   Skin: warm, no rashes   Neuro/Psych: A&O x 3, normal mood and affect    Cardiac testing today:  · ECG (6/29/2022 ): SR at 62 bpm, QTc = 439 ms, anteroseptal T-wave abnormality     Assessment/plan:  1. PVCs/NSVT  -Idiopathic.  - Nuclear stress test in 2021 was reportedly low risk. Frequent PVCs noted during exertion, which appear to be RCC origin based on morphology on twelve-lead ECG. -Cardiac MRI 05/04/2022 that showed no intracardial fibrosis, inflammation, infiltrative disease or prior infarct.  -Refractory to acebutolol. After review PVC management options (AA v ablation), she desired ablation, but no availability until 5/24/22, so Amiodarone changed to flecainide and metoprolol. On 5/24/2022, EP study showed anterior RVOT (45 ms pre-QRS, QS unipolar signal), but ablation aborted due to new small pericardial effusion following mapping. Post EP study Effusion was noted to be small and stable on 3 consecutive echos. On flecainide and metoprolol, VE burden was ~5%.   -Increase flecainide from 100 mg every 12 hours to 100 mg every 8 hours.  -Continue metoprolol XL 50 mg every 12 hours.  -Follow-up with my office for an ECG in 1 week. - Follow-up with me in office in 3 months. Thank you for allowing me to participate in your patient's care. Please call me if there are any questions. I spent a total of 40 minutes reviewing previous notes, test results, and face to face with the patient discussing the diagnosis and importance of compliance with the treatment plan as well as documenting on the day of the visit. Time of the day of service includes:  · Preparing to see the patient (eg. Review of the medical record, such as tests). · Obtaining and/or reviewing separately obtained history. · Ordering prescription medications, tests, and/or procedures.   · Communicating results to the patient/family/caregiver. · Counseling/educating the patient/family/caregiver. · Documenting clinical information in the patients electronic record. · Coordination of care for the patient. · Performing a medical appropriate exam and/or evaluation.      Falguni Mayes,   Cardiac Electrophysiology  [facility]  [facility]) Physicians  6/24/22     CC: Dalia Clemons MD

## 2022-06-29 ENCOUNTER — OFFICE VISIT (OUTPATIENT)
Dept: NON INVASIVE DIAGNOSTICS | Age: 72
End: 2022-06-29
Payer: COMMERCIAL

## 2022-06-29 VITALS
HEART RATE: 62 BPM | BODY MASS INDEX: 22.55 KG/M2 | DIASTOLIC BLOOD PRESSURE: 76 MMHG | WEIGHT: 148.8 LBS | HEIGHT: 68 IN | RESPIRATION RATE: 18 BRPM | SYSTOLIC BLOOD PRESSURE: 136 MMHG

## 2022-06-29 DIAGNOSIS — I47.29 NSVT (NONSUSTAINED VENTRICULAR TACHYCARDIA): Primary | ICD-10-CM

## 2022-06-29 PROBLEM — E89.0 POSTOPERATIVE HYPOTHYROIDISM: Status: ACTIVE | Noted: 2020-08-17

## 2022-06-29 PROBLEM — E11.9 TYPE II DIABETES MELLITUS, WELL CONTROLLED (HCC): Status: ACTIVE | Noted: 2021-03-15

## 2022-06-29 PROCEDURE — G8399 PT W/DXA RESULTS DOCUMENT: HCPCS | Performed by: STUDENT IN AN ORGANIZED HEALTH CARE EDUCATION/TRAINING PROGRAM

## 2022-06-29 PROCEDURE — 93000 ELECTROCARDIOGRAM COMPLETE: CPT | Performed by: STUDENT IN AN ORGANIZED HEALTH CARE EDUCATION/TRAINING PROGRAM

## 2022-06-29 PROCEDURE — 1090F PRES/ABSN URINE INCON ASSESS: CPT | Performed by: STUDENT IN AN ORGANIZED HEALTH CARE EDUCATION/TRAINING PROGRAM

## 2022-06-29 PROCEDURE — 1036F TOBACCO NON-USER: CPT | Performed by: STUDENT IN AN ORGANIZED HEALTH CARE EDUCATION/TRAINING PROGRAM

## 2022-06-29 PROCEDURE — 99215 OFFICE O/P EST HI 40 MIN: CPT | Performed by: STUDENT IN AN ORGANIZED HEALTH CARE EDUCATION/TRAINING PROGRAM

## 2022-06-29 PROCEDURE — G8420 CALC BMI NORM PARAMETERS: HCPCS | Performed by: STUDENT IN AN ORGANIZED HEALTH CARE EDUCATION/TRAINING PROGRAM

## 2022-06-29 PROCEDURE — G8427 DOCREV CUR MEDS BY ELIG CLIN: HCPCS | Performed by: STUDENT IN AN ORGANIZED HEALTH CARE EDUCATION/TRAINING PROGRAM

## 2022-06-29 PROCEDURE — 1123F ACP DISCUSS/DSCN MKR DOCD: CPT | Performed by: STUDENT IN AN ORGANIZED HEALTH CARE EDUCATION/TRAINING PROGRAM

## 2022-06-29 PROCEDURE — 3017F COLORECTAL CA SCREEN DOC REV: CPT | Performed by: STUDENT IN AN ORGANIZED HEALTH CARE EDUCATION/TRAINING PROGRAM

## 2022-06-29 RX ORDER — FLECAINIDE ACETATE 100 MG/1
100 TABLET ORAL EVERY 8 HOURS
Qty: 270 TABLET | Refills: 1 | Status: SHIPPED | OUTPATIENT
Start: 2022-06-29 | End: 2022-09-27

## 2022-06-29 NOTE — PATIENT INSTRUCTIONS
Increase flecainide from 100 mg tablet every 12 hours to 100 mg tablet every 8 hours. Continue metoprolol XL 50 mg every 12 hours. Contact Dr Bulmaro Finnegan office if symptoms remain uncontrolled after 2 weeks of increased flecainide dose. Follow-up with Dr Bulmaro Finnegan office in 1 week for an ECG. Follow-up with Dr Bulmaro Finnegan office for evaluation in 3 months.

## 2022-07-07 ENCOUNTER — NURSE ONLY (OUTPATIENT)
Dept: NON INVASIVE DIAGNOSTICS | Age: 72
End: 2022-07-07
Payer: COMMERCIAL

## 2022-07-07 DIAGNOSIS — I47.29 NSVT (NONSUSTAINED VENTRICULAR TACHYCARDIA): Primary | ICD-10-CM

## 2022-07-07 DIAGNOSIS — I47.20 VT (VENTRICULAR TACHYCARDIA): ICD-10-CM

## 2022-07-07 PROCEDURE — 93000 ELECTROCARDIOGRAM COMPLETE: CPT | Performed by: STUDENT IN AN ORGANIZED HEALTH CARE EDUCATION/TRAINING PROGRAM

## 2022-07-07 NOTE — PROGRESS NOTES
Patient here for 1 week EKG s/p increasing flecainide. She is feeling better since increasing the flecianide. The patient does complain of a little bit of nausea and constipation since increasing, but she knows this is a common side effect.

## 2022-09-16 ENCOUNTER — OFFICE VISIT (OUTPATIENT)
Dept: RHEUMATOLOGY | Age: 72
End: 2022-09-16
Payer: MEDICARE

## 2022-09-16 VITALS
HEIGHT: 68 IN | BODY MASS INDEX: 22.13 KG/M2 | OXYGEN SATURATION: 99 % | SYSTOLIC BLOOD PRESSURE: 118 MMHG | HEART RATE: 66 BPM | WEIGHT: 146 LBS | DIASTOLIC BLOOD PRESSURE: 70 MMHG

## 2022-09-16 DIAGNOSIS — M15.9 GENERALIZED OSTEOARTHRITIS: ICD-10-CM

## 2022-09-16 DIAGNOSIS — Z79.899 HIGH RISK MEDICATION USE: ICD-10-CM

## 2022-09-16 DIAGNOSIS — D84.9 IMMUNOSUPPRESSION (HCC): ICD-10-CM

## 2022-09-16 DIAGNOSIS — M35.3 POLYMYALGIA (HCC): ICD-10-CM

## 2022-09-16 DIAGNOSIS — M85.89 OSTEOPENIA OF MULTIPLE SITES: ICD-10-CM

## 2022-09-16 DIAGNOSIS — E11.9 TYPE II DIABETES MELLITUS, WELL CONTROLLED (HCC): ICD-10-CM

## 2022-09-16 DIAGNOSIS — I47.29 NSVT (NONSUSTAINED VENTRICULAR TACHYCARDIA): ICD-10-CM

## 2022-09-16 DIAGNOSIS — M35.3 POLYMYALGIA (HCC): Primary | ICD-10-CM

## 2022-09-16 LAB
ALBUMIN SERPL-MCNC: 4.8 G/DL (ref 3.5–5.2)
ALP BLD-CCNC: 90 U/L (ref 35–104)
ALT SERPL-CCNC: 22 U/L (ref 0–32)
ANION GAP SERPL CALCULATED.3IONS-SCNC: 19 MMOL/L (ref 7–16)
AST SERPL-CCNC: 34 U/L (ref 0–31)
BASOPHILS ABSOLUTE: 0.03 E9/L (ref 0–0.2)
BASOPHILS RELATIVE PERCENT: 0.5 % (ref 0–2)
BILIRUB SERPL-MCNC: 0.5 MG/DL (ref 0–1.2)
BUN BLDV-MCNC: 15 MG/DL (ref 6–23)
C-REACTIVE PROTEIN: 0.3 MG/DL (ref 0–0.4)
CALCIUM SERPL-MCNC: 9.4 MG/DL (ref 8.6–10.2)
CHLORIDE BLD-SCNC: 100 MMOL/L (ref 98–107)
CO2: 22 MMOL/L (ref 22–29)
CREAT SERPL-MCNC: 0.6 MG/DL (ref 0.5–1)
EOSINOPHILS ABSOLUTE: 0.08 E9/L (ref 0.05–0.5)
EOSINOPHILS RELATIVE PERCENT: 1.2 % (ref 0–6)
GFR AFRICAN AMERICAN: >60
GFR NON-AFRICAN AMERICAN: >60 ML/MIN/1.73
GLUCOSE BLD-MCNC: 114 MG/DL (ref 74–99)
HCT VFR BLD CALC: 46.3 % (ref 34–48)
HEMOGLOBIN: 15.4 G/DL (ref 11.5–15.5)
IMMATURE GRANULOCYTES #: 0.02 E9/L
IMMATURE GRANULOCYTES %: 0.3 % (ref 0–5)
LYMPHOCYTES ABSOLUTE: 0.84 E9/L (ref 1.5–4)
LYMPHOCYTES RELATIVE PERCENT: 12.8 % (ref 20–42)
MCH RBC QN AUTO: 34.7 PG (ref 26–35)
MCHC RBC AUTO-ENTMCNC: 33.3 % (ref 32–34.5)
MCV RBC AUTO: 104.3 FL (ref 80–99.9)
MONOCYTES ABSOLUTE: 0.76 E9/L (ref 0.1–0.95)
MONOCYTES RELATIVE PERCENT: 11.6 % (ref 2–12)
NEUTROPHILS ABSOLUTE: 4.82 E9/L (ref 1.8–7.3)
NEUTROPHILS RELATIVE PERCENT: 73.6 % (ref 43–80)
PDW BLD-RTO: 13 FL (ref 11.5–15)
PLATELET # BLD: 347 E9/L (ref 130–450)
PMV BLD AUTO: 10.4 FL (ref 7–12)
POTASSIUM SERPL-SCNC: 4 MMOL/L (ref 3.5–5)
RBC # BLD: 4.44 E12/L (ref 3.5–5.5)
RHEUMATOID FACTOR: <10 IU/ML (ref 0–13)
SODIUM BLD-SCNC: 141 MMOL/L (ref 132–146)
TOTAL CK: 380 U/L (ref 20–180)
TOTAL PROTEIN: 7.5 G/DL (ref 6.4–8.3)
WBC # BLD: 6.6 E9/L (ref 4.5–11.5)

## 2022-09-16 PROCEDURE — 99205 OFFICE O/P NEW HI 60 MIN: CPT | Performed by: INTERNAL MEDICINE

## 2022-09-16 PROCEDURE — 1123F ACP DISCUSS/DSCN MKR DOCD: CPT | Performed by: INTERNAL MEDICINE

## 2022-09-16 PROCEDURE — 3044F HG A1C LEVEL LT 7.0%: CPT | Performed by: INTERNAL MEDICINE

## 2022-09-16 ASSESSMENT — ENCOUNTER SYMPTOMS
COLOR CHANGE: 0
COUGH: 0
NAUSEA: 0
DIARRHEA: 0
SHORTNESS OF BREATH: 0
TROUBLE SWALLOWING: 0
ABDOMINAL PAIN: 0
VOMITING: 0

## 2022-09-16 NOTE — PROGRESS NOTES
Douglas Long Held 1950 is a 71 y.o. female, here for evaluation of the following chief complaint(s):  Establish Care (myalgia)         ASSESSMENT/PLAN:    Douglas Long Held 1950 is a 71 y.o. female seen in follow-up for polymyalgia/myositis. 1.  Polymyalgia-there is some discrepancy as to what exactly we are dealing with. She was given a diagnosis of PMR at 1 point but typically PMR will go into remission after your 2 on steroids. There was then some concern for inflammatory myositis but apparently she had a negative biopsy. Nevertheless she seems to be doing pretty well on Imuran 50 mg twice daily and prednisone 2.5 mg daily. She would like to try to get all the way off of prednisone particularly in light of her cardiac rhythm issues. I think that is a good idea. We will try decreasing prednisone to 2-1/2 mg every other day for 6 weeks and then stop. Her most recent CKs have been in the 200 range which is essentially normal.  Her muscle strength is good on exam except for a little weakness in the left leg which may be coming from her back. We will get further work-up as below to get a better characterization of her disease. If she continues to do well at next visit we may even consider cutting back on Imuran. 2.  Immunosuppression-I recommend she stay up-to-date on vaccinations. In the event of any acute infectious illness she should hold Imuran. 3.  Medication monitoring-we will need to monitor basic blood work every 3 months on Imuran. 4.  Insulin-dependent diabetes-she is on a low-dose of prednisone and any effect on blood sugars is likely minimal but would like to get her off of prednisone altogether if we can.    5.  Osteopenia-due for repeat bone density scan in January 2023. Currently taking vitamin D supplementation. In the past she had not tolerated Fosamax. She did get 1 dose of Reclast a few years back.     6.  NSVT-another reason to try to get off prednisone though again at such Nevertheless she was started on methotrexate by her previous rheumatologist.  She did not tolerate it and was then placed on Imuran. She did not tolerate 100 mg twice daily but has been on 50 mg twice daily for some time now and tolerates that well. Her prednisone was also tapered down to 5 mg daily and she did pretty well for the most part. She then went into V. tach this previous may and her prednisone was actually held. She felt like she did pretty much just as well off of the prednisone so only went back on 2.5 mg daily. She states that aside from some joint aches from osteoarthritis she for the most part does pretty well. She does have some weakness in the left leg which may be related to significant degenerative changes in the low back. She has not had fever or rash. She does occasionally have some trouble swallowing. She does not have Raynaud's, chest pain, or shortness of breath. Most recent CKs have been in the 200 range. Past Medical History:   Diagnosis Date    Celiac disease     Palpitations     Polymyositis ([facility] [address]         Review of Systems   Constitutional:  Negative for fatigue and fever. HENT:  Negative for mouth sores and trouble swallowing. Respiratory:  Negative for cough and shortness of breath. Cardiovascular:  Negative for chest pain. Gastrointestinal:  Negative for abdominal pain, diarrhea, nausea and vomiting. Genitourinary:  Negative for dysuria and hematuria. Musculoskeletal:  Positive for arthralgias. Negative for joint swelling. Skin:  Negative for color change and rash. Neurological:  Positive for weakness. Negative for numbness. Hematological:  Negative for adenopathy. All other systems reviewed and are negative. Objective   Vitals:    09/16/22 1101   BP: 118/70   Pulse: 66   SpO2: 99%      Physical Exam  Constitutional:       General: She is not in acute distress. Appearance: Normal appearance. HENT:      Head: Normocephalic and atraumatic. Right Ear: External ear normal.      Left Ear: External ear normal.      Nose: Nose normal.   Eyes:      General: No scleral icterus. Pulmonary:      Effort: Pulmonary effort is normal.   Musculoskeletal:         General: Tenderness and deformity present. No swelling. Comments: He has some Heberden's and Chase's nodes. Right shoulder is tender. I see no striking synovitis on exam.  She has some Dupuytren's contractures on the right. Skin:     General: Skin is warm and dry. Findings: No rash. Neurological:      General: No focal deficit present. Mental Status: She is alert and oriented to person, place, and time. Mental status is at baseline. Comments: Strength is 4+ out of 5 in the left hip flexors. Strength is 5 out of 5 throughout otherwise. Psychiatric:         Mood and Affect: Mood normal.         Behavior: Behavior normal.          Lab Results   Component Value Date    WBC 6.3 05/25/2022    HGB 11.9 05/25/2022    HCT 36.3 05/25/2022    .1 (H) 05/25/2022     05/25/2022     Lab Results   Component Value Date     05/25/2022    K 4.0 05/25/2022     05/25/2022    CO2 27 05/25/2022    BUN 15 05/25/2022    CREATININE 0.7 05/25/2022    GLUCOSE 104 (H) 05/25/2022    CALCIUM 8.1 (L) 05/25/2022    PROT 5.9 (L) 05/25/2022    LABALBU 3.7 05/25/2022    BILITOT 0.4 05/25/2022    ALKPHOS 41 05/25/2022    AST 18 05/25/2022    ALT 14 05/25/2022    LABGLOM >60 05/25/2022    GFRAA >60 05/25/2022     No results found for: JADYN  No results found for: RHEUMFACTOR  Lab Results   Component Value Date    SEDRATE 2 09/13/2019     Lab Results   Component Value Date    CRP 0.4 12/11/2017            An electronic signature was used to authenticate this note. This note was generated with a voice recognition dictation system. Please disregard any errors or omission which have escaped my review.     --Adalgisa Norman,

## 2022-09-17 LAB — SEDIMENTATION RATE, ERYTHROCYTE: 2 MM/HR (ref 0–20)

## 2022-09-19 LAB — ANTI-NUCLEAR ANTIBODY (ANA): NEGATIVE

## 2022-09-20 LAB — ALDOLASE: 6.6 U/L (ref 1.2–7.6)

## 2022-09-21 LAB — CYCLIC CITRULLINATED PEPTIDE ANTIBODY IGG: 0.6 U/ML (ref 0–7)

## 2022-10-02 LAB
Lab: NORMAL
REPORT: NORMAL
THIS TEST SENT TO: NORMAL

## 2022-10-03 ENCOUNTER — PATIENT MESSAGE (OUTPATIENT)
Dept: RHEUMATOLOGY | Age: 72
End: 2022-10-03

## 2022-10-03 NOTE — TELEPHONE ENCOUNTER
From: Lynette Edouard Held  To: Dr. Brittni Kern: 10/3/2022 10:46 AM EDT  Subject: Recent sent out lab. Result unclear to me.

## 2022-10-03 NOTE — TELEPHONE ENCOUNTER
Spoke with patient about her blood work, patient voiced understanding.     Electronically signed by Haily Couch MA on 10/3/2022 at 11:09 AM

## 2022-10-19 RX ORDER — METOPROLOL SUCCINATE 50 MG/1
50 TABLET, EXTENDED RELEASE ORAL 2 TIMES DAILY
Qty: 180 TABLET | Refills: 3 | Status: SHIPPED | OUTPATIENT
Start: 2022-10-19

## 2022-12-08 NOTE — PROGRESS NOTES
[address] ELECTROPHYSIOLOGY DEPARTMENT/DIVISION OF CARDIOLOGy  Outpatient Progress Note. PATIENT: Juanita Hunt  MEDICAL RECORD NUMBER: [id number]  DATE OF SERVICE:  12/8/2022   ATTENDING ELECTROPHYSIOLOGIST: Bobby Ramírez MD  PRIMARY ELECTROPHYSIOLOGIST:  Beverly Nascimento DO   REFERRING PHYSICIAN:   Omar Corea MD  CHIEF COMPLAINT: NSVT    HPI: Nathan Jackson is a 71 y.o. 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 She is managed by Dr. Mariusz Bullard with at flecainide 100 mg 3 times daily, metoprolol XL 50 mg every 12 hours, and rosuvastatin 10 mg daily. Her medications also include azathioprine and prednisone, which were prescribed for her polymyositis. In approximately 1980, patient reports she was diagnosed with PVCs, which were treated with acebutolol, which resolved symptoms of palpitations. In 2021, she establish cardiology care with Dr. Mariusz Bullard. She complained of exertional chest pain at that time, so exercise nuclear stress test was performed. Stress test reported no ischemia and normal LVEF, but did note frequent PVCs during exertion (on review of telemetry strips, site of origin is suspected to be RCC). No changes in management were made at that time. In 5/2022, she was diagnosed with idiopathic VT based on cardiac MRI and telemetry. On 5/24/2022, PVC mapped to anterior RVOT (43 MS 3 QRS, QS unipolar signal), but procedure aborted prior to ablation due to new pericardial effusion observed following mapping. Serial TTE reported stable small circumferential pericardial effusion without tamponade. She was discharged on flecainide/metoprolol with a rhythm monitor, which reported VE burden of 5.2% and episodes of NSVT (longest: 3.2 seconds, fastest: 194 bpm) associated with palpitations.   Flecainide increased to 100 mg 3 times daily. An ECG 1 week later revealed sinus rhythm with no ventricular ectopy. She presents today, 12/8/2022; for follow-up with my office. She reports palpitations have resolved since increasing to flecainide TID. She also reports that her diagnosed with polymyositis is now under question and is being investigated with additional testing. Her prednisone was discontinued recently with plan for possible discontinue patient of azathioprine in the future. She denies any other complaints at this time. Prior cardiac testing:  ECG (12/8/2022 ): SR at 62 bpm, QTc = 439 ms, anteroseptal T-wave abnormality  14 day Zio XT: 6/15/2022: Predominant underlying rhythm was Sinus Rhythm. 418 Ventricular Tachycardia runs occurred, the run with the longest lasting 6 beats. 4 Supraventricular Tachycardia runs occurred, the run with the longest interval lasting 8 beats. Isolated SVEs were rare (<1.0%), SVE Couplets were rare (<1.0%), and SVE Triplets were rare (<1.0%). Isolated VEs were frequent (5.2%, [id number]), VE Couplets were rare (<1.0%, 3607), and VE Triplets were rare (<1.0%, 2179). Ventricular Bigeminy and Trigeminy were present. Triggered events and reported symptoms were correlated with sinus rhythm, PVCs and NSVTs. Limited TTE (05/25/2022 @ 0940): Small circumferential pericardial effusion no change from prior study  Limited TTE (05/24/2022 @ 50116): Small circumferential pericardial effusion similar to previous study  Limited TTE (05/24/2022 @ 1043): Small circumferential pericardial effusion  Cardiac MRI (5/4/2022): Normal LV size and function, LVEF 51%, normal RV size/function, no evidence of intra myocardial fibrosis, inflammatory patterns infiltrative disease or prior infarct.   Exercise nuclear stress test (2/2/2021): LVEF = 67%, normal perfusion with the exception of attenuation artifact due to soft tissue/breast attenuation at 85% of APMHR, perfusion, TID = 1.17, and increasing monomorphic PVCs with exertion and reduction in PVC burden with recovery, and average exercise capacity.     Past Medical History:   Diagnosis Date    Celiac disease     Palpitations     Polymyositis ([address]      Past Surgical History:   Procedure Laterality Date    HERNIA REPAIR      HYSTERECTOMY (CERVIX STATUS UNKNOWN)      LUMBAR LAMINECTOMY  ,     THYROIDECTOMY, COMPLETION        Family History   Problem Relation Age of Onset    Diabetes Mother     Heart Disease Mother     Cancer Father     Cancer Sister      There is no family history of sudden cardiac arrest    Social History     Tobacco Use    Smoking status: Former     Types: Cigarettes     Quit date: 3/15/1977     Years since quittin.7    Smokeless tobacco: Never   Substance Use Topics    Alcohol use: No       Current Outpatient Medications   Medication Sig Dispense Refill    metoprolol succinate (TOPROL XL) 50 MG extended release tablet Take 1 tablet by mouth 2 times daily at 0800 and 1400 180 tablet 3    flecainide (TAMBOCOR) 100 MG tablet Take 1 tablet by mouth every 8 hours 270 tablet 1    vitamin B-12 (CYANOCOBALAMIN) 500 MCG tablet Take 1 tablet by mouth daily 30 tablet 0    calcium carbonate (OSCAL) 500 MG TABS tablet Take 500 mg by mouth 2 times daily      metFORMIN (GLUCOPHAGE-XR) 500 MG extended release tablet Take 500 mg by mouth 2 times daily      LANTUS SOLOSTAR 100 UNIT/ML injection pen Inject 10 Units into the skin nightly       rosuvastatin (CRESTOR) 10 MG tablet Take 10 mg by mouth nightly       predniSONE (DELTASONE) 2.5 MG tablet Take 2.5 mg by mouth daily       azaTHIOprine (IMURAN) 50 MG tablet Take 50 mg by mouth 2 times daily      levothyroxine (SYNTHROID) 100 MCG tablet Take 100 mcg by mouth Six times weekly Given Monday,Tuesday,Wednesday,Thursday,Friday,Saturday      famotidine (PEPCID) 20 MG tablet Take 20 mg by mouth daily       vitamin D (CHOLECALCIFEROL) 1000 UNITS TABS tablet Take 1,000 Units by mouth daily        No current facility-administered medications for this visit. Allergies   Allergen Reactions    Sulfa Antibiotics Hives    Tetracyclines & Related Hives     ROS:   Constitutional: Negative for fever, activity change and appetite change. HENT: Negative for epistaxis. Eyes: Negative for diploplia, blurred vision. Respiratory: Negative for cough, chest tightness, shortness of breath and wheezing. Cardiovascular: pertinent positives in HPI  Gastrointestinal: Negative for abdominal pain and blood in stool. Genitourinary: Negative for hematuria and difficulty urinating. Musculoskeletal: Negative for myalgias and gait problem. Skin: Negative for color change and rash. Neurological: Negative for syncope and light-headedness. Psychiatric/Behavioral: Negative for confusion and agitation. The patient is not nervous/anxious. Heme: no bleeding disorders, no melena or hematochezia  All other review of systems are negative     PHYSICAL EXAM:  /64 (Site: Left Upper Arm, Position: Sitting, Cuff Size: Medium Adult)   Pulse 56   Resp 16   Ht 5' 8\" (1.727 m)   Wt 149 lb 12.8 oz (67.9 kg)   BMI 22.78 kg/m²     Constitutional: Well-developed, no acute distress, well groomed examined in room with family at bedside. Eyes: conjunctivae normal, no xanthelasma   Ears, Nose, Throat: mask, oral mucosa moist, no cyanosis   Neck: supple, no JVD, no thyromegaly   CV: normal rate, regular rhythm, no m/r/g, Peripheral pulses normal including bilateral radial and pedal pulses are normal in quality  Lungs: BS+ b/l, no w/r/r/c, normal respiratory effort without used of accessory muscles, no audible wheezes  Abdomen: soft, non-tender, no masses or hepatomegaly   Extremities: no digital clubbing, no edema   Skin: warm, no rashes   Neuro/Psych: A&O x 3, normal mood and affect    Cardiac testing today:  ECG (12/8/2022 ): Sinus at 56 bpm, QTC = 427 ms     Assessment/plan:  1. Idiopathic PVCs/NSVT  - Refractory to acebutolol.   In 5/2022, PVC mapped to the anterior RVOT(45 ms pre-QRS, QS unipolar signal), but procedure aborted prior to attempted ablation due to new pericardial effusion. Since that time, she has been treated with flecainide and metoprolol. A cardiac monitor approximately 1 month after attempted ablation reported ventricular ectopy about 5% with brief episodes of NSVT (longest: 6 beats). Flecainide increased to 100 mg 3 times daily. Continue metoprolol XL 50 mg twice daily.  -Recommend 72 hour event monitor to further assess. She is interested in reducing medications in the future. Depending on results of event monitor, may reduce flecainide and/or metoprolol.  -Follow-up with my office in 1 year. 2.  Possible polymyositis  -Previously diagnosed with polymyositis and treated with prednisone and azathioprine. Recently transferred rheumatology care to a different rheumatologist, who is questioning this diagnosis. Her prednisone was discontinued. Additional testing is ongoing. Thank you for allowing me to participate in your patient's care. Please call me if there are any questions. I spent a total of 30 minutes reviewing previous notes, test results, and face to face with the patient discussing the diagnosis and importance of compliance with the treatment plan as well as documenting on the day of the visit. Time of the day of service includes:  Preparing to see the patient (eg. Review of the medical record, such as tests). Obtaining and/or reviewing separately obtained history. Ordering prescription medications, tests, and/or procedures. Communicating results to the patient/family/caregiver. Counseling/educating the patient/family/caregiver. Documenting clinical information in the patients electronic record. Coordination of care for the patient. Performing a medical appropriate exam and/or evaluation.      Dipesh Lomas DO  Cardiac Electrophysiology  [facility]  [facility]) Physicians  12/8/22     CC: [facility] Cem Randall MD

## 2022-12-09 ENCOUNTER — OFFICE VISIT (OUTPATIENT)
Dept: NON INVASIVE DIAGNOSTICS | Age: 72
End: 2022-12-09

## 2022-12-09 VITALS
SYSTOLIC BLOOD PRESSURE: 132 MMHG | HEIGHT: 68 IN | BODY MASS INDEX: 22.7 KG/M2 | WEIGHT: 149.8 LBS | HEART RATE: 56 BPM | DIASTOLIC BLOOD PRESSURE: 64 MMHG | RESPIRATION RATE: 16 BRPM

## 2022-12-09 DIAGNOSIS — I49.3 PVC (PREMATURE VENTRICULAR CONTRACTION): ICD-10-CM

## 2022-12-09 DIAGNOSIS — I47.29 NSVT (NONSUSTAINED VENTRICULAR TACHYCARDIA): Primary | ICD-10-CM

## 2022-12-09 PROBLEM — E16.2 HYPOGLYCEMIA: Status: ACTIVE | Noted: 2022-06-15

## 2022-12-09 PROBLEM — D84.9 IMMUNOSUPPRESSION (HCC): Status: RESOLVED | Noted: 2022-09-16 | Resolved: 2022-12-09

## 2022-12-09 RX ORDER — TRAMADOL HYDROCHLORIDE 50 MG/1
50 TABLET ORAL DAILY
COMMUNITY
Start: 2022-11-30 | End: 2022-12-09

## 2022-12-09 NOTE — PROGRESS NOTES
Patient was seen in Office today to have 3 DAY ZIO XT monitor put on per Dr. Juan Thornton. Pt tolerated placement and understood use and return of device number [id number].         Electronically signed by Arabella Canales MA on 12/9/2022 at 10:24 AM

## 2022-12-09 NOTE — PATIENT INSTRUCTIONS
No medication changes at this time. Complete 72-hour cardiac monitor. Return via mail. Follow-up with this office in 1 year.

## 2022-12-19 ENCOUNTER — TELEPHONE (OUTPATIENT)
Dept: NON INVASIVE DIAGNOSTICS | Age: 72
End: 2022-12-19

## 2022-12-19 DIAGNOSIS — I47.29 NSVT (NONSUSTAINED VENTRICULAR TACHYCARDIA): ICD-10-CM

## 2022-12-19 NOTE — TELEPHONE ENCOUNTER
----- Message from Christine Garcia DO sent at 12/19/2022  9:44 AM EST -----  Regarding: monitor  Monitor with no significant abnormalities. Recommend:  1.  Follow-up with my office in 11/2023.    -Christine Garcia DO

## 2023-01-09 ENCOUNTER — OFFICE VISIT (OUTPATIENT)
Dept: RHEUMATOLOGY | Age: 73
End: 2023-01-09
Payer: MEDICARE

## 2023-01-09 VITALS
HEART RATE: 62 BPM | OXYGEN SATURATION: 97 % | DIASTOLIC BLOOD PRESSURE: 70 MMHG | BODY MASS INDEX: 21.52 KG/M2 | RESPIRATION RATE: 16 BRPM | WEIGHT: 142 LBS | SYSTOLIC BLOOD PRESSURE: 126 MMHG | HEIGHT: 68 IN

## 2023-01-09 DIAGNOSIS — M35.3 POLYMYALGIA (HCC): ICD-10-CM

## 2023-01-09 DIAGNOSIS — Z79.899 HIGH RISK MEDICATION USE: ICD-10-CM

## 2023-01-09 DIAGNOSIS — D84.9 IMMUNOSUPPRESSION (HCC): ICD-10-CM

## 2023-01-09 DIAGNOSIS — I47.29 NSVT (NONSUSTAINED VENTRICULAR TACHYCARDIA): ICD-10-CM

## 2023-01-09 DIAGNOSIS — M85.89 OSTEOPENIA OF MULTIPLE SITES: ICD-10-CM

## 2023-01-09 DIAGNOSIS — M35.00 SJOGREN'S SYNDROME WITHOUT EXTRAGLANDULAR INVOLVEMENT (HCC): ICD-10-CM

## 2023-01-09 DIAGNOSIS — M35.3 POLYMYALGIA (HCC): Primary | ICD-10-CM

## 2023-01-09 PROCEDURE — 99215 OFFICE O/P EST HI 40 MIN: CPT | Performed by: INTERNAL MEDICINE

## 2023-01-09 PROCEDURE — 3017F COLORECTAL CA SCREEN DOC REV: CPT | Performed by: INTERNAL MEDICINE

## 2023-01-09 PROCEDURE — G8427 DOCREV CUR MEDS BY ELIG CLIN: HCPCS | Performed by: INTERNAL MEDICINE

## 2023-01-09 PROCEDURE — G8420 CALC BMI NORM PARAMETERS: HCPCS | Performed by: INTERNAL MEDICINE

## 2023-01-09 PROCEDURE — 1036F TOBACCO NON-USER: CPT | Performed by: INTERNAL MEDICINE

## 2023-01-09 PROCEDURE — 1123F ACP DISCUSS/DSCN MKR DOCD: CPT | Performed by: INTERNAL MEDICINE

## 2023-01-09 PROCEDURE — 1090F PRES/ABSN URINE INCON ASSESS: CPT | Performed by: INTERNAL MEDICINE

## 2023-01-09 PROCEDURE — G8484 FLU IMMUNIZE NO ADMIN: HCPCS | Performed by: INTERNAL MEDICINE

## 2023-01-09 PROCEDURE — G8399 PT W/DXA RESULTS DOCUMENT: HCPCS | Performed by: INTERNAL MEDICINE

## 2023-01-09 ASSESSMENT — ENCOUNTER SYMPTOMS
NAUSEA: 0
ABDOMINAL PAIN: 0
COLOR CHANGE: 0
COUGH: 0
SHORTNESS OF BREATH: 0
TROUBLE SWALLOWING: 0
DIARRHEA: 0
VOMITING: 0

## 2023-01-09 NOTE — PROGRESS NOTES
Abimbola Morgan Held 1950 is a 71 y.o. female, here for evaluation of the following chief complaint(s):  Follow-up (Patient here for follow up on polyarthritis, OA.)         ASSESSMENT/PLAN:    Abimbola Morgan Held 1950 is a 71 y.o. female seen in follow-up for polymyalgia/myositis. 1.  Polymyalgia-there is some discrepancy as to what exactly we are dealing with. She was given a diagnosis of PMR at 1 point but typically PMR will go into remission after your 2 on steroids. There was then some concern for inflammatory myositis but apparently she had a negative biopsy. Nevertheless she seemed to do pretty well on Imuran 50 mg twice daily and prednisone 2.5 mg daily. So last visit we tapered her off of prednisone and she continues to do well. Her last CK was 380 but she does not have any muscle weakness. Otherwise work-up from last visit showed negative JADYN and rheumatoid factor, normal aldolase, normal inflammatory markers. Myomarker 3 panel did show positive SSA 52 kDa but otherwise unrevealing. We will actually try stopping Imuran altogether. We will update work today and again in 6 weeks. 2.  Immunosuppression-I recommend she stay up-to-date on vaccinations. As above we will be stopping Imuran. 3.  Insulin-dependent diabetes-as above we were able to taper her off of prednisone. 4.  Osteopenia-due for repeat bone density scan so we will check that today. Currently taking vitamin D supplementation. In the past she had not tolerated Fosamax. She did get 1 dose of Reclast a few years back. She did have a left tibial plateau stress fracture since last visit. 5.  NSVT-may or may not have been related to prednisone. She was on a pretty low dose. Nevertheless we are able to taper off even at low-dose. She apparently had a normal Holter monitor recently. 6.  Sjogren's syndrome-she had positive SSA 52 kDa on [employer]. She does not have a whole lot of issues with sicca symptoms.   Otherwise no signs of extraglandular manifestations. We will check an SPEP. 1. Polymyalgia ([facility]  -     CBC with Auto Differential; Standing  -     Comprehensive Metabolic Panel; Standing  -     C-Reactive Protein; Standing  -     Sedimentation Rate; Standing  -     Aldolase; Standing  -     CK; Standing  -     Electrophoresis Protein, Serum; Future  -     Urinalysis; Standing  2. Immunosuppression (HCC)  -     CBC with Auto Differential; Standing  -     Comprehensive Metabolic Panel; Standing  -     C-Reactive Protein; Standing  -     Sedimentation Rate; Standing  -     Aldolase; Standing  -     CK; Standing  -     Electrophoresis Protein, Serum; Future  -     Urinalysis; Standing  3. High risk medication use  -     CBC with Auto Differential; Standing  -     Comprehensive Metabolic Panel; Standing  -     C-Reactive Protein; Standing  -     Sedimentation Rate; Standing  -     Aldolase; Standing  -     CK; Standing  -     Electrophoresis Protein, Serum; Future  -     Urinalysis; Standing  4. Sjogren's syndrome without extraglandular involvement ([facility]  -     CBC with Auto Differential; Standing  -     Comprehensive Metabolic Panel; Standing  -     C-Reactive Protein; Standing  -     Sedimentation Rate; Standing  -     Aldolase; Standing  -     CK; Standing  -     Electrophoresis Protein, Serum; Future  -     Urinalysis; Standing  5. Osteopenia of multiple sites  -     DEXA BONE DENSITY 2 SITES; Future  6. NSVT (nonsustained ventricular tachycardia)      Return in about 3 months (around 4/9/2023). Subjective   SUBJECTIVE/OBJECTIVE:    HPI: Justin Caldera Held 1950 is a 71 y.o. female seen in follow-up for polymyalgia/myositis. Last visit we did work-up which showed negative JADYN, normal aldolase, negative rheumatoid factor, normal inflammatory markers. CK was 380. She does not really have any muscle weakness. We did taper her off of prednisone since last visit and she has continued to do well for the most part. Since last visit she did have a left tibial plateau stress fracture. Myomarker panel did show positive SSA 52 kDa. She does not really have a lot of issues with sicca symptoms. Initial history: Patient states that in 2016 she started having muscle weakness. She had trouble getting up from a seated position. She had a lot of pain associated as well. There was concern for PMR and she was placed on prednisone and did really well. 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 She apparently had an EMG and muscle biopsy at [facility] which were both unrevealing. Nevertheless she was started on methotrexate by her previous rheumatologist.  She did not tolerate it and was then placed on Imuran. She did not tolerate 100 mg twice daily but has been on 50 mg twice daily for some time now and tolerates that well. Her prednisone was also tapered down to 5 mg daily and she did pretty well for the most part. She then went into V. tach this previous may and her prednisone was actually held. She felt like she did pretty much just as well off of the prednisone so only went back on 2.5 mg daily. She states that aside from some joint aches from osteoarthritis she for the most part does pretty well. She does have some weakness in the left leg which may be related to significant degenerative changes in the low back. She has not had fever or rash. She does occasionally have some trouble swallowing. She does not have Raynaud's, chest pain, or shortness of breath. Most recent CKs have been in the 200 range. Past Medical History:   Diagnosis Date    Celiac disease     Palpitations     Polymyositis ([facility]         Review of Systems   Constitutional:  Negative for fatigue and fever. HENT:  Negative for mouth sores and trouble swallowing. Respiratory:  Negative for cough and shortness of breath. Cardiovascular:  Negative for chest pain. Gastrointestinal:  Negative for abdominal pain, diarrhea, nausea and vomiting. Genitourinary:  Negative for dysuria and hematuria. Musculoskeletal:  Positive for arthralgias. Negative for joint swelling. Skin:  Negative for color change and rash. Neurological:  Negative for weakness and numbness. Hematological:  Negative for adenopathy. All other systems reviewed and are negative. Objective   Vitals:    01/09/23 1047   BP: 126/70   Pulse: 62   Resp: 16   SpO2: 97%      Physical Exam  Constitutional:       General: She is not in acute distress. Appearance: Normal appearance. HENT:      Head: Normocephalic and atraumatic. Right Ear: External ear normal.      Left Ear: External ear normal.      Nose: Nose normal.   Eyes:      General: No scleral icterus. Pulmonary:      Effort: Pulmonary effort is normal.   Musculoskeletal:         General: Deformity present. No swelling or tenderness. Comments: He has some Heberden's and Chase's nodes. She has some Dupuytren's contractures on the right. Skin:     General: Skin is warm and dry. Findings: No rash. Neurological:      General: No focal deficit present. Mental Status: She is alert and oriented to person, place, and time. Mental status is at baseline. Comments: Strength is 5 out of 5 throughout.    Psychiatric:         Mood and Affect: Mood normal.         Behavior: Behavior normal.          Lab Results   Component Value Date    WBC 6.6 09/16/2022    HGB 15.4 09/16/2022    HCT 46.3 09/16/2022    .3 (H) 09/16/2022     09/16/2022     Lab Results   Component Value Date     09/16/2022    K 4.0 09/16/2022     09/16/2022    CO2 22 09/16/2022    BUN 15 09/16/2022    CREATININE 0.6 09/16/2022    GLUCOSE 114 (H) 09/16/2022    CALCIUM 9.4 09/16/2022    PROT 7.5 09/16/2022    LABALBU 4.8 09/16/2022    BILITOT 0.5 09/16/2022    ALKPHOS 90 09/16/2022    AST 34 (H) 09/16/2022    ALT 22 09/16/2022 LABGLOM >60 09/16/2022    GFRAA >60 09/16/2022     Lab Results   Component Value Date    JADYN NEGATIVE 09/16/2022     No results found for: RHEUMFACTOR  Lab Results   Component Value Date    SEDRATE 2 09/16/2022     Lab Results   Component Value Date    CRP 0.3 09/16/2022            An electronic signature was used to authenticate this note. This note was generated with a voice recognition dictation system. Please disregard any errors or omission which have escaped my review.     --Jewell Phillips, DO

## 2023-01-11 LAB
ALBUMIN SERPL-MCNC: 3.3 G/DL (ref 3.5–4.7)
ALPHA-1-GLOBULIN: 0.4 G/DL (ref 0.2–0.4)
ALPHA-2-GLOBULIN: 1 G/DL (ref 0.5–1)
BETA GLOBULIN: 1.3 G/DL (ref 0.8–1.3)
ELECTROPHORESIS: ABNORMAL
GAMMA GLOBULIN: 1.2 G/DL (ref 0.7–1.6)
TOTAL PROTEIN: 7.1 G/DL (ref 6.4–8.3)

## 2023-01-30 ENCOUNTER — HOSPITAL ENCOUNTER (OUTPATIENT)
Dept: MAMMOGRAPHY | Age: 73
Discharge: HOME | End: 2023-02-01
Payer: MEDICARE

## 2023-01-30 DIAGNOSIS — M85.89 OSTEOPENIA OF MULTIPLE SITES: ICD-10-CM

## 2023-01-30 PROCEDURE — 77080 DXA BONE DENSITY AXIAL: CPT

## 2023-01-31 DIAGNOSIS — M85.89 OSTEOPENIA OF MULTIPLE SITES: Primary | ICD-10-CM

## 2023-01-31 RX ORDER — SODIUM CHLORIDE 9 MG/ML
INJECTION, SOLUTION INTRAVENOUS CONTINUOUS
OUTPATIENT
Start: 2023-01-31

## 2023-01-31 RX ORDER — SODIUM CHLORIDE 0.9 % (FLUSH) 0.9 %
5-40 SYRINGE (ML) INJECTION PRN
OUTPATIENT
Start: 2023-01-31

## 2023-01-31 RX ORDER — 0.9 % SODIUM CHLORIDE 0.9 %
250 INTRAVENOUS SOLUTION INTRAVENOUS ONCE
OUTPATIENT
Start: 2023-01-31 | End: 2023-01-31

## 2023-01-31 RX ORDER — ACETAMINOPHEN 325 MG/1
650 TABLET ORAL
OUTPATIENT
Start: 2023-01-31

## 2023-01-31 RX ORDER — ZOLEDRONIC ACID 5 MG/100ML
5 INJECTION, SOLUTION INTRAVENOUS ONCE
OUTPATIENT
Start: 2023-01-31 | End: 2023-01-31

## 2023-01-31 RX ORDER — SODIUM CHLORIDE 9 MG/ML
5-250 INJECTION, SOLUTION INTRAVENOUS PRN
OUTPATIENT
Start: 2023-01-31

## 2023-01-31 RX ORDER — DIPHENHYDRAMINE HYDROCHLORIDE 50 MG/ML
50 INJECTION INTRAMUSCULAR; INTRAVENOUS
OUTPATIENT
Start: 2023-01-31

## 2023-01-31 RX ORDER — HEPARIN SODIUM (PORCINE) LOCK FLUSH IV SOLN 100 UNIT/ML 100 UNIT/ML
500 SOLUTION INTRAVENOUS PRN
OUTPATIENT
Start: 2023-01-31

## 2023-01-31 RX ORDER — ALBUTEROL SULFATE 90 UG/1
4 AEROSOL, METERED RESPIRATORY (INHALATION) PRN
OUTPATIENT
Start: 2023-01-31

## 2023-01-31 RX ORDER — SODIUM CHLORIDE 9 MG/ML
INJECTION, SOLUTION INTRAVENOUS ONCE
OUTPATIENT
Start: 2023-01-31 | End: 2023-01-31

## 2023-01-31 RX ORDER — FAMOTIDINE 10 MG/ML
20 INJECTION, SOLUTION INTRAVENOUS
OUTPATIENT
Start: 2023-01-31

## 2023-01-31 RX ORDER — EPINEPHRINE 1 MG/ML
0.3 INJECTION, SOLUTION, CONCENTRATE INTRAVENOUS PRN
OUTPATIENT
Start: 2023-01-31

## 2023-01-31 RX ORDER — ONDANSETRON 2 MG/ML
8 INJECTION INTRAMUSCULAR; INTRAVENOUS
OUTPATIENT
Start: 2023-01-31

## 2023-02-09 ENCOUNTER — HOSPITAL ENCOUNTER (OUTPATIENT)
Dept: INFUSION THERAPY | Age: 73
Setting detail: INFUSION SERIES
Discharge: HOME | End: 2023-02-09
Payer: MEDICARE

## 2023-02-09 VITALS
SYSTOLIC BLOOD PRESSURE: 156 MMHG | BODY MASS INDEX: 21.67 KG/M2 | RESPIRATION RATE: 16 BRPM | HEIGHT: 68 IN | OXYGEN SATURATION: 97 % | HEART RATE: 61 BPM | WEIGHT: 143 LBS | TEMPERATURE: 97 F | DIASTOLIC BLOOD PRESSURE: 71 MMHG

## 2023-02-09 DIAGNOSIS — M85.89 OSTEOPENIA OF MULTIPLE SITES: Primary | ICD-10-CM

## 2023-02-09 PROCEDURE — 6360000002 HC RX W HCPCS: Performed by: INTERNAL MEDICINE

## 2023-02-09 PROCEDURE — 96361 HYDRATE IV INFUSION ADD-ON: CPT

## 2023-02-09 PROCEDURE — 2580000003 HC RX 258: Performed by: INTERNAL MEDICINE

## 2023-02-09 PROCEDURE — 96365 THER/PROPH/DIAG IV INF INIT: CPT

## 2023-02-09 RX ORDER — SODIUM CHLORIDE 0.9 % (FLUSH) 0.9 %
5-40 SYRINGE (ML) INJECTION PRN
OUTPATIENT
Start: 2023-02-09

## 2023-02-09 RX ORDER — ONDANSETRON 2 MG/ML
8 INJECTION INTRAMUSCULAR; INTRAVENOUS
OUTPATIENT
Start: 2023-02-09

## 2023-02-09 RX ORDER — SODIUM CHLORIDE 9 MG/ML
5-250 INJECTION, SOLUTION INTRAVENOUS PRN
OUTPATIENT
Start: 2023-02-09

## 2023-02-09 RX ORDER — DIPHENHYDRAMINE HYDROCHLORIDE 50 MG/ML
50 INJECTION INTRAMUSCULAR; INTRAVENOUS
OUTPATIENT
Start: 2023-02-09

## 2023-02-09 RX ORDER — ZOLEDRONIC ACID 5 MG/100ML
5 INJECTION, SOLUTION INTRAVENOUS ONCE
Status: CANCELLED | OUTPATIENT
Start: 2023-02-09 | End: 2023-02-09

## 2023-02-09 RX ORDER — EPINEPHRINE 1 MG/ML
0.3 INJECTION, SOLUTION, CONCENTRATE INTRAVENOUS PRN
OUTPATIENT
Start: 2023-02-09

## 2023-02-09 RX ORDER — 0.9 % SODIUM CHLORIDE 0.9 %
250 INTRAVENOUS SOLUTION INTRAVENOUS ONCE
Status: COMPLETED | OUTPATIENT
Start: 2023-02-09 | End: 2023-02-09

## 2023-02-09 RX ORDER — SODIUM CHLORIDE 9 MG/ML
INJECTION, SOLUTION INTRAVENOUS ONCE
Status: COMPLETED | OUTPATIENT
Start: 2023-02-09 | End: 2023-02-09

## 2023-02-09 RX ORDER — ZOLEDRONIC ACID 5 MG/100ML
5 INJECTION, SOLUTION INTRAVENOUS ONCE
Status: COMPLETED | OUTPATIENT
Start: 2023-02-09 | End: 2023-02-09

## 2023-02-09 RX ORDER — SODIUM CHLORIDE 9 MG/ML
INJECTION, SOLUTION INTRAVENOUS ONCE
Status: CANCELLED | OUTPATIENT
Start: 2023-02-09 | End: 2023-02-09

## 2023-02-09 RX ORDER — SODIUM CHLORIDE 0.9 % (FLUSH) 0.9 %
5-40 SYRINGE (ML) INJECTION PRN
Status: DISCONTINUED | OUTPATIENT
Start: 2023-02-09 | End: 2023-02-10 | Stop reason: HOSPADM

## 2023-02-09 RX ORDER — ALBUTEROL SULFATE 90 UG/1
4 AEROSOL, METERED RESPIRATORY (INHALATION) PRN
OUTPATIENT
Start: 2023-02-09

## 2023-02-09 RX ORDER — SODIUM CHLORIDE 9 MG/ML
INJECTION, SOLUTION INTRAVENOUS CONTINUOUS
OUTPATIENT
Start: 2023-02-09

## 2023-02-09 RX ORDER — HEPARIN SODIUM (PORCINE) LOCK FLUSH IV SOLN 100 UNIT/ML 100 UNIT/ML
500 SOLUTION INTRAVENOUS PRN
OUTPATIENT
Start: 2023-02-09

## 2023-02-09 RX ORDER — ACETAMINOPHEN 325 MG/1
650 TABLET ORAL
OUTPATIENT
Start: 2023-02-09

## 2023-02-09 RX ORDER — 0.9 % SODIUM CHLORIDE 0.9 %
250 INTRAVENOUS SOLUTION INTRAVENOUS ONCE
Status: CANCELLED | OUTPATIENT
Start: 2023-02-09 | End: 2023-02-09

## 2023-02-09 RX ADMIN — SODIUM CHLORIDE 250 ML: 9 INJECTION, SOLUTION INTRAVENOUS at 08:22

## 2023-02-09 RX ADMIN — ZOLEDRONIC ACID 5 MG: 0.05 INJECTION, SOLUTION INTRAVENOUS at 07:59

## 2023-02-09 RX ADMIN — SODIUM CHLORIDE: 9 INJECTION, SOLUTION INTRAVENOUS at 07:57

## 2023-02-09 RX ADMIN — SODIUM CHLORIDE, PRESERVATIVE FREE 10 ML: 5 INJECTION INTRAVENOUS at 07:55

## 2023-02-21 ENCOUNTER — HOSPITAL ENCOUNTER (OUTPATIENT)
Age: 73
Discharge: HOME | End: 2023-02-21
Payer: MEDICARE

## 2023-02-21 DIAGNOSIS — M35.3 POLYMYALGIA (HCC): ICD-10-CM

## 2023-02-21 DIAGNOSIS — D84.9 IMMUNOSUPPRESSION (HCC): ICD-10-CM

## 2023-02-21 DIAGNOSIS — Z79.899 HIGH RISK MEDICATION USE: ICD-10-CM

## 2023-02-21 DIAGNOSIS — M35.00 SJOGREN'S SYNDROME WITHOUT EXTRAGLANDULAR INVOLVEMENT (HCC): ICD-10-CM

## 2023-02-21 LAB
ALBUMIN SERPL-MCNC: 4 G/DL (ref 3.5–5.2)
ALP BLD-CCNC: 88 U/L (ref 35–104)
ALT SERPL-CCNC: 18 U/L (ref 0–32)
ANION GAP SERPL CALCULATED.3IONS-SCNC: 11 MMOL/L (ref 7–16)
AST SERPL-CCNC: 27 U/L (ref 0–31)
BACTERIA: NORMAL /HPF
BASOPHILS ABSOLUTE: 0.03 E9/L (ref 0–0.2)
BASOPHILS RELATIVE PERCENT: 0.5 % (ref 0–2)
BILIRUB SERPL-MCNC: 0.4 MG/DL (ref 0–1.2)
BILIRUBIN URINE: ABNORMAL
BLOOD, URINE: ABNORMAL
BUN BLDV-MCNC: 15 MG/DL (ref 6–23)
C-REACTIVE PROTEIN: <0.3 MG/DL (ref 0–0.4)
CALCIUM SERPL-MCNC: 8 MG/DL (ref 8.6–10.2)
CHLORIDE BLD-SCNC: 104 MMOL/L (ref 98–107)
CLARITY: CLEAR
CO2: 23 MMOL/L (ref 22–29)
COLOR: YELLOW
CREAT SERPL-MCNC: 0.6 MG/DL (ref 0.5–1)
EOSINOPHILS ABSOLUTE: 0.07 E9/L (ref 0.05–0.5)
EOSINOPHILS RELATIVE PERCENT: 1.2 % (ref 0–6)
GFR SERPL CREATININE-BSD FRML MDRD: >60 ML/MIN/1.73
GLUCOSE BLD-MCNC: 131 MG/DL (ref 74–99)
GLUCOSE URINE: NEGATIVE MG/DL
HCT VFR BLD CALC: 38.9 % (ref 34–48)
HEMOGLOBIN: 13.3 G/DL (ref 11.5–15.5)
IMMATURE GRANULOCYTES #: 0.02 E9/L
IMMATURE GRANULOCYTES %: 0.3 % (ref 0–5)
KETONES, URINE: ABNORMAL MG/DL
LEUKOCYTE ESTERASE, URINE: NEGATIVE
LYMPHOCYTES ABSOLUTE: 0.71 E9/L (ref 1.5–4)
LYMPHOCYTES RELATIVE PERCENT: 12.3 % (ref 20–42)
MCH RBC QN AUTO: 33.7 PG (ref 26–35)
MCHC RBC AUTO-ENTMCNC: 34.2 % (ref 32–34.5)
MCV RBC AUTO: 98.5 FL (ref 80–99.9)
MONOCYTES ABSOLUTE: 0.65 E9/L (ref 0.1–0.95)
MONOCYTES RELATIVE PERCENT: 11.2 % (ref 2–12)
NEUTROPHILS ABSOLUTE: 4.3 E9/L (ref 1.8–7.3)
NEUTROPHILS RELATIVE PERCENT: 74.5 % (ref 43–80)
NITRITE, URINE: NEGATIVE
PDW BLD-RTO: 11.9 FL (ref 11.5–15)
PH UA: 5.5 (ref 5–9)
PLATELET # BLD: 311 E9/L (ref 130–450)
PMV BLD AUTO: 9.3 FL (ref 7–12)
POTASSIUM SERPL-SCNC: 4.1 MMOL/L (ref 3.5–5)
PROTEIN UA: NEGATIVE MG/DL
RBC # BLD: 3.95 E12/L (ref 3.5–5.5)
RBC UA: NORMAL /HPF (ref 0–2)
SEDIMENTATION RATE, ERYTHROCYTE: 10 MM/HR (ref 0–20)
SODIUM BLD-SCNC: 138 MMOL/L (ref 132–146)
SPECIFIC GRAVITY UA: >=1.03 (ref 1–1.03)
TOTAL CK: 480 U/L (ref 20–180)
TOTAL PROTEIN: 6.7 G/DL (ref 6.4–8.3)
UROBILINOGEN, URINE: 0.2 E.U./DL
WBC # BLD: 5.8 E9/L (ref 4.5–11.5)
WBC UA: NORMAL /HPF (ref 0–5)

## 2023-02-21 PROCEDURE — 82085 ASSAY OF ALDOLASE: CPT

## 2023-02-21 PROCEDURE — 80053 COMPREHEN METABOLIC PANEL: CPT

## 2023-02-21 PROCEDURE — 85025 COMPLETE CBC W/AUTO DIFF WBC: CPT

## 2023-02-21 PROCEDURE — 82550 ASSAY OF CK (CPK): CPT

## 2023-02-21 PROCEDURE — 81001 URINALYSIS AUTO W/SCOPE: CPT

## 2023-02-21 PROCEDURE — 86140 C-REACTIVE PROTEIN: CPT

## 2023-02-21 PROCEDURE — 36415 COLL VENOUS BLD VENIPUNCTURE: CPT

## 2023-02-21 PROCEDURE — 85651 RBC SED RATE NONAUTOMATED: CPT

## 2023-02-24 LAB — ALDOLASE: 4.5 U/L (ref 1.2–7.6)

## 2023-03-30 NOTE — PROGRESS NOTES
When speaking with pt for PAT instructions, pt states this surgery is \"LOCAL\" Nurse explained to her upon reviewing her history with Dr. Eren Dumont she [facility] so she can be monitored during her surgery. Pt voiced concerns about this and is going to speak with Dr. Ralf Marlow and get back with us.

## 2023-03-30 NOTE — PROGRESS NOTES
Reviewed pt history with Dr. Sally Boucher and that pt is scheduled as a \"LOCAL\". Dr. Sally Boucher states pt needs to [facility] or General Anesthesia because of her history.   LM  with Dr. Finch Neighbor office re: above

## 2023-03-31 ENCOUNTER — ANESTHESIA EVENT (OUTPATIENT)
Dept: OPERATING ROOM | Age: 73
End: 2023-03-31
Payer: MEDICARE

## 2023-03-31 NOTE — ANESTHESIA PRE PROCEDURE
----- Message from Aaron Wallace sent at 2019 10:56 AM CST -----  Regarding: osf  Outside Film Request    Patient Name:  Juan Carlos Lee  MRN:  [id number]    Type of Images/Reports requested:  Ct abd  Approximate date of prior imagin17  Ordering provider:  Gilberto Daugherty Imaging Facility: [facility]    Name:   Address:  City/State:     Phone:   Fax:            Take 500 mg by mouth 2 times daily      metFORMIN (GLUCOPHAGE-XR) 500 MG extended release tablet Take 500 mg by mouth daily (with breakfast)      LANTUS SOLOSTAR 100 UNIT/ML injection pen Inject 10 Units into the skin nightly       rosuvastatin (CRESTOR) 10 MG tablet Take 10 mg by mouth nightly       levothyroxine (SYNTHROID) 100 MCG tablet Take 100 mcg by mouth Six times weekly Given Monday,Tuesday,Wednesday,Thursday,Friday,Saturday      vitamin D (CHOLECALCIFEROL) 1000 UNITS TABS tablet Take 1,000 Units by mouth daily        No current facility-administered medications for this visit. Allergies: Allergies   Allergen Reactions    Sulfa Antibiotics Hives    Tetracyclines & Related Hives       Problem List:    Patient Active Problem List   Diagnosis Code    Osteoporosis M81.0    V-tach I47.20    NSVT (nonsustained ventricular tachycardia) I47.29    PVC (premature ventricular contraction) I49.3    VT (ventricular tachycardia) I47.20    Pericardial effusion I31.39    Polymyositis associated with autoimmune disease ([facility]) M33.20, M35.9    Postoperative hypothyroidism E89.0    Type II diabetes mellitus, well controlled ([facility]) E11.9    Polymyalgia ([facility]) M35.3    Immunosuppression ([address] D84.9    Hypoglycemia E16.2    High risk medication use Z79.899    Sjogren's syndrome without extraglandular involvement ([address] M35.00    Osteopenia of multiple sites M85.89       Past Medical History:        Diagnosis Date    Celiac disease     Palpitations     Polymyositis ([address]        Past Surgical History:        Procedure Laterality Date    HERNIA REPAIR      HYSTERECTOMY (CERVIX STATUS UNKNOWN)  1992 [place] [address], 27    THYROIDECTOMY, COMPLETION         Social History:    Social History     Tobacco Use    Smoking status: Former     Types: Cigarettes     Quit date: 3/15/1977     Years since quittin.0    Smokeless tobacco: Never   Substance Use Topics    Alcohol use:  No

## 2023-04-03 NOTE — PROGRESS NOTES
[facility] PRE-ADMISSION TESTING INSTRUCTIONS    The Preadmission Testing patient is instructed accordingly using the following criteria (check applicable):    ARRIVAL INSTRUCTIONS:  [x] Parking the day of Surgery is located in the Main Entrance lot. Upon entering the door, make an immediate right to the surgery reception desk    [x] Bring photo ID and insurance card    [] Bring in a copy of Living will or Durable Power of  papers. [x] Please be sure to arrange for responsible adult to provide transportation to and from the hospital    [x] Please arrange for responsible adult to be with you for the 24 hour period post procedure due to having anesthesia    [x] If you awake am of surgery not feeling well or have temperature >100 please call [phone]    GENERAL INSTRUCTIONS:    [x] Nothing by mouth after midnight, including gum, candy, mints or water    [x] You may brush your teeth, but do not swallow any water    [x] Take medications as instructed with 1-2 oz of water    [x] Stop herbal supplements and vitamins 5 days prior to procedure    [] Follow preop dosing of blood thinners per physician instructions    [] Take 1/2 dose of evening insulin, but no insulin after midnight    [x] No oral diabetic medications after midnight    [x] If diabetic and have low blood sugar or feel symptomatic, take 1-2oz apple juice only    [] Bring inhalers day of surgery    [] Bring C-PAP/ Bi-Pap day of surgery    [] Bring urine specimen day of surgery    [x] Shower or bath with soap, lather and rinse well, AM of Surgery, no lotion, powders or creams to surgical site    [] Follow bowel prep as instructed per surgeon    [x] No tobacco products within 24 hours of surgery     [x] No alcohol or illegal drug use within 24 hours of surgery.     [x] Jewelry, body piercing's, eyeglasses, contact lenses and dentures are not permitted into surgery (bring cases)      [x] Please do not wear any nail polish,

## 2023-04-03 NOTE — H&P
tendons / ligaments / muscles, need for additional surgery, loss of limb, blood clots, stroke, heart attack, problems related to anesthesia, and pneumonia. The patient wishes to proceed with surgery after this discussion of potential complications. She is to follow-up postoperatively. A report will be sent to the requesting physician including diagnosis, treatment options, and current plan. This note was created using Dragon speech recognition software with possible scribe assistance. Please excuse any inadvertent dictation errors.

## 2023-04-04 ENCOUNTER — HOSPITAL ENCOUNTER (OUTPATIENT)
Age: 73
Setting detail: OUTPATIENT SURGERY
Discharge: HOME HEALTH | End: 2023-04-04
Attending: ORTHOPAEDIC SURGERY | Admitting: ORTHOPAEDIC SURGERY
Payer: MEDICARE

## 2023-04-04 ENCOUNTER — ANESTHESIA (OUTPATIENT)
Dept: OPERATING ROOM | Age: 73
End: 2023-04-04
Payer: MEDICARE

## 2023-04-04 VITALS
DIASTOLIC BLOOD PRESSURE: 69 MMHG | WEIGHT: 143 LBS | BODY MASS INDEX: 21.67 KG/M2 | RESPIRATION RATE: 18 BRPM | HEIGHT: 68 IN | HEART RATE: 69 BPM | TEMPERATURE: 96.8 F | OXYGEN SATURATION: 100 % | SYSTOLIC BLOOD PRESSURE: 150 MMHG

## 2023-04-04 DIAGNOSIS — M65.332 TRIGGER FINGER, LEFT MIDDLE FINGER: Primary | ICD-10-CM

## 2023-04-04 LAB — METER GLUCOSE: 106 MG/DL (ref 74–99)

## 2023-04-04 PROCEDURE — 3700000001 HC ADD 15 MINUTES (ANESTHESIA): Performed by: ORTHOPAEDIC SURGERY

## 2023-04-04 PROCEDURE — 7100000011 HC PHASE II RECOVERY - ADDTL 15 MIN: Performed by: ORTHOPAEDIC SURGERY

## 2023-04-04 PROCEDURE — 3600000002 HC SURGERY LEVEL 2 BASE: Performed by: ORTHOPAEDIC SURGERY

## 2023-04-04 PROCEDURE — 82962 GLUCOSE BLOOD TEST: CPT

## 2023-04-04 PROCEDURE — 2580000003 HC RX 258: Performed by: ORTHOPAEDIC SURGERY

## 2023-04-04 PROCEDURE — 2580000003 HC RX 258: Performed by: NURSE ANESTHETIST, CERTIFIED REGISTERED

## 2023-04-04 PROCEDURE — 3700000000 HC ANESTHESIA ATTENDED CARE: Performed by: ORTHOPAEDIC SURGERY

## 2023-04-04 PROCEDURE — 7100000010 HC PHASE II RECOVERY - FIRST 15 MIN: Performed by: ORTHOPAEDIC SURGERY

## 2023-04-04 PROCEDURE — 2709999900 HC NON-CHARGEABLE SUPPLY: Performed by: ORTHOPAEDIC SURGERY

## 2023-04-04 PROCEDURE — 2500000003 HC RX 250 WO HCPCS: Performed by: ORTHOPAEDIC SURGERY

## 2023-04-04 PROCEDURE — 3600000012 HC SURGERY LEVEL 2 ADDTL 15MIN: Performed by: ORTHOPAEDIC SURGERY

## 2023-04-04 PROCEDURE — 6360000002 HC RX W HCPCS: Performed by: ORTHOPAEDIC SURGERY

## 2023-04-04 RX ORDER — DIPHENHYDRAMINE HYDROCHLORIDE 50 MG/ML
12.5 INJECTION INTRAMUSCULAR; INTRAVENOUS
Status: DISCONTINUED | OUTPATIENT
Start: 2023-04-04 | End: 2023-04-04 | Stop reason: HOSPADM

## 2023-04-04 RX ORDER — SODIUM CHLORIDE 0.9 % (FLUSH) 0.9 %
5-40 SYRINGE (ML) INJECTION PRN
Status: DISCONTINUED | OUTPATIENT
Start: 2023-04-04 | End: 2023-04-04 | Stop reason: HOSPADM

## 2023-04-04 RX ORDER — SODIUM CHLORIDE 9 MG/ML
INJECTION, SOLUTION INTRAVENOUS PRN
Status: DISCONTINUED | OUTPATIENT
Start: 2023-04-04 | End: 2023-04-04 | Stop reason: HOSPADM

## 2023-04-04 RX ORDER — SODIUM CHLORIDE 0.9 % (FLUSH) 0.9 %
5-40 SYRINGE (ML) INJECTION EVERY 12 HOURS SCHEDULED
Status: DISCONTINUED | OUTPATIENT
Start: 2023-04-04 | End: 2023-04-04 | Stop reason: HOSPADM

## 2023-04-04 RX ORDER — KETOROLAC TROMETHAMINE 30 MG/ML
15 INJECTION, SOLUTION INTRAMUSCULAR; INTRAVENOUS ONCE
Status: DISCONTINUED | OUTPATIENT
Start: 2023-04-04 | End: 2023-04-04 | Stop reason: HOSPADM

## 2023-04-04 RX ORDER — HYDRALAZINE HYDROCHLORIDE 20 MG/ML
10 INJECTION INTRAMUSCULAR; INTRAVENOUS
Status: DISCONTINUED | OUTPATIENT
Start: 2023-04-04 | End: 2023-04-04 | Stop reason: HOSPADM

## 2023-04-04 RX ORDER — HALOPERIDOL 5 MG/ML
1 INJECTION INTRAMUSCULAR
Status: DISCONTINUED | OUTPATIENT
Start: 2023-04-04 | End: 2023-04-04 | Stop reason: HOSPADM

## 2023-04-04 RX ORDER — SODIUM CHLORIDE 9 MG/ML
INJECTION, SOLUTION INTRAVENOUS CONTINUOUS PRN
Status: DISCONTINUED | OUTPATIENT
Start: 2023-04-04 | End: 2023-04-04 | Stop reason: SDUPTHER

## 2023-04-04 RX ORDER — IPRATROPIUM BROMIDE AND ALBUTEROL SULFATE 2.5; .5 MG/3ML; MG/3ML
1 SOLUTION RESPIRATORY (INHALATION)
Status: DISCONTINUED | OUTPATIENT
Start: 2023-04-04 | End: 2023-04-04 | Stop reason: HOSPADM

## 2023-04-04 RX ORDER — PYRAZINAMIDE 500 MG/1
1 TABLET ORAL EVERY 4 HOURS PRN
Qty: 18 TABLET | Refills: 0 | Status: SHIPPED | OUTPATIENT
Start: 2023-04-04 | End: 2023-04-07

## 2023-04-04 RX ORDER — SODIUM CHLORIDE 9 MG/ML
INJECTION, SOLUTION INTRAVENOUS CONTINUOUS
Status: DISCONTINUED | OUTPATIENT
Start: 2023-04-04 | End: 2023-04-04 | Stop reason: HOSPADM

## 2023-04-04 RX ORDER — MEPERIDINE HYDROCHLORIDE 25 MG/ML
12.5 INJECTION INTRAMUSCULAR; INTRAVENOUS; SUBCUTANEOUS EVERY 5 MIN PRN
Status: DISCONTINUED | OUTPATIENT
Start: 2023-04-04 | End: 2023-04-04 | Stop reason: HOSPADM

## 2023-04-04 RX ORDER — PROCHLORPERAZINE EDISYLATE 5 MG/ML
5 INJECTION INTRAMUSCULAR; INTRAVENOUS
Status: DISCONTINUED | OUTPATIENT
Start: 2023-04-04 | End: 2023-04-04 | Stop reason: HOSPADM

## 2023-04-04 RX ORDER — LABETALOL HYDROCHLORIDE 5 MG/ML
10 INJECTION, SOLUTION INTRAVENOUS
Status: DISCONTINUED | OUTPATIENT
Start: 2023-04-04 | End: 2023-04-04 | Stop reason: HOSPADM

## 2023-04-04 RX ADMIN — SODIUM CHLORIDE: 9 INJECTION, SOLUTION INTRAVENOUS at 11:55

## 2023-04-04 RX ADMIN — WATER 2000 MG: 1 INJECTION INTRAMUSCULAR; INTRAVENOUS; SUBCUTANEOUS at 11:55

## 2023-04-04 ASSESSMENT — PAIN SCALES - GENERAL: PAINLEVEL_OUTOF10: 0

## 2023-04-04 NOTE — DISCHARGE INSTRUCTIONS
Pain medication is Tylenol #3 - sent to pharmacy. Weightbearing status is wbat to left hand. Keep dressing clean and dry for 2 days postop. Then, OK to take off dressing and to get wound wet with clean water (shower). Do not get wound wet with dirty water (dishwater). Do not tub soak wound for 1 month (pool, bath). Follow up with Dr. Gely Win in 2 weeks.

## 2023-04-04 NOTE — ANESTHESIA POSTPROCEDURE EVALUATION
Department of Anesthesiology  Postprocedure Note    Patient: Salena Cespedes  MRN: [id number]  YOB: 1950  Date of evaluation: 4/4/2023      Procedure Summary     Date: 04/04/23 Room / Location: [place] [address] / [facility]    Anesthesia Start: 9193 Anesthesia Stop: 6809    Procedure: LEFT MIDDLE FINGER TRIGGER RELEASE   (ANESTHESIA STANDBY) (Left: Hand) Diagnosis:       Trigger thumb of left hand      (Trigger thumb of left hand [M65.312])    Surgeons: Arnaud Orosco MD Responsible Provider: Thelma Rodas MD    Anesthesia Type: MAC ASA Status: 3          Anesthesia Type: No value filed.     Indy Phase I: Indy Score: 10    Indy Phase II:        Anesthesia Post Evaluation    Patient location during evaluation: PACU  Patient participation: complete - patient participated  Level of consciousness: awake and alert  Pain score: 0  Airway patency: patent  Nausea & Vomiting: no nausea and no vomiting  Complications: no  Cardiovascular status: hemodynamically stable  Respiratory status: acceptable

## 2023-04-05 NOTE — OP NOTE
[address] [address]                                OPERATIVE REPORT    PATIENT NAME: Jere Diana                        :        1950  MED REC NO:   [id number]                            ROOM:  ACCOUNT NO:   [de-identified]                           ADMIT DATE: 2023  PROVIDER:     Edis Tavares    DATE OF PROCEDURE:  2023    PREOPERATIVE DIAGNOSIS:  Left middle trigger finger. POSTOPERATIVE DIAGNOSIS:  Left middle trigger finger. PROCEDURE PERFORMED:  Left middle trigger finger release. COMPLICATIONS:  None. ANTIBIOTICS:  See record. ESTIMATED BLOOD LOSS:  Minimal.    ANESTHESIA:  Local only. TOURNIQUET TIME:  Esmarch was used. INDICATIONS:  This is a 50-year-old female diagnosed with left middle  trigger finger based on history and physical and imaging. After  discussing the risks and benefits including nonoperative treatment, the  patient wished to undergo procedure listed above. DESCRIPTION OF PROCEDURE:  The patient was brought to the operating room  on her cart. She was transferred to the operating room table. All the  extremities were well padded. Tourniquet was placed on the left upper  arm. She underwent local anesthesia. She was prepped and draped in  sterile fashion using ChloraPrep. Time-out was performed. Skin was  incised with a knife longitudinally overlying the A1 region of the  middle finger. Subcutaneous tissues were dissected with tenotomy  scissors. Flexor sheath was identified. This was released with a  knife. Release was completed proximally and distally with tenotomy  scissors. Flexor tendons were identified in the wound. These were then  retracted and adhesions were debrided. Finger was taken through full  range of motion and no more triggering was noted. The patient was asked  to move finger actively and no triggering was noted.   Tourniquet

## 2023-04-06 ENCOUNTER — HOSPITAL ENCOUNTER (OUTPATIENT)
Age: 73
Discharge: HOME | End: 2023-04-06
Payer: MEDICARE

## 2023-04-06 DIAGNOSIS — M35.00 SJOGREN'S SYNDROME WITHOUT EXTRAGLANDULAR INVOLVEMENT (HCC): ICD-10-CM

## 2023-04-06 DIAGNOSIS — M35.3 POLYMYALGIA (HCC): ICD-10-CM

## 2023-04-06 DIAGNOSIS — Z79.899 HIGH RISK MEDICATION USE: ICD-10-CM

## 2023-04-06 DIAGNOSIS — D84.9 IMMUNOSUPPRESSION (HCC): ICD-10-CM

## 2023-04-06 LAB
ALBUMIN SERPL-MCNC: 4.3 G/DL (ref 3.5–5.2)
ALP SERPL-CCNC: 75 U/L (ref 35–104)
ALT SERPL-CCNC: 18 U/L (ref 0–32)
ANION GAP SERPL CALCULATED.3IONS-SCNC: 11 MMOL/L (ref 7–16)
AST SERPL-CCNC: 31 U/L (ref 0–31)
BACTERIA URNS QL MICRO: NORMAL /HPF
BASOPHILS # BLD: 0.02 E9/L (ref 0–0.2)
BASOPHILS NFR BLD: 0.4 % (ref 0–2)
BILIRUB SERPL-MCNC: 0.3 MG/DL (ref 0–1.2)
BILIRUB UR QL STRIP: NEGATIVE
BUN SERPL-MCNC: 21 MG/DL (ref 6–23)
CALCIUM SERPL-MCNC: 9.2 MG/DL (ref 8.6–10.2)
CHLORIDE SERPL-SCNC: 101 MMOL/L (ref 98–107)
CK SERPL-CCNC: 392 U/L (ref 20–180)
CLARITY UR: CLEAR
CO2 SERPL-SCNC: 27 MMOL/L (ref 22–29)
COLOR UR: YELLOW
CREAT SERPL-MCNC: 0.7 MG/DL (ref 0.5–1)
CRP SERPL HS-MCNC: <0.3 MG/DL (ref 0–0.4)
EOSINOPHIL # BLD: 0.06 E9/L (ref 0.05–0.5)
EOSINOPHIL NFR BLD: 1.3 % (ref 0–6)
ERYTHROCYTE [DISTWIDTH] IN BLOOD BY AUTOMATED COUNT: 11.9 FL (ref 11.5–15)
ERYTHROCYTE [SEDIMENTATION RATE] IN BLOOD BY WESTERGREN METHOD: 5 MM/HR (ref 0–20)
GLUCOSE SERPL-MCNC: 221 MG/DL (ref 74–99)
GLUCOSE UR STRIP-MCNC: NEGATIVE MG/DL
HCT VFR BLD AUTO: 42.6 % (ref 34–48)
HGB BLD-MCNC: 14 G/DL (ref 11.5–15.5)
HGB UR QL STRIP: ABNORMAL
IMM GRANULOCYTES # BLD: 0.01 E9/L
IMM GRANULOCYTES NFR BLD: 0.2 % (ref 0–5)
KETONES UR STRIP-MCNC: NEGATIVE MG/DL
LEUKOCYTE ESTERASE UR QL STRIP: NEGATIVE
LYMPHOCYTES # BLD: 0.79 E9/L (ref 1.5–4)
LYMPHOCYTES NFR BLD: 17 % (ref 20–42)
MCH RBC QN AUTO: 32.9 PG (ref 26–35)
MCHC RBC AUTO-ENTMCNC: 32.9 % (ref 32–34.5)
MCV RBC AUTO: 100 FL (ref 80–99.9)
MONOCYTES # BLD: 0.48 E9/L (ref 0.1–0.95)
MONOCYTES NFR BLD: 10.3 % (ref 2–12)
NEUTROPHILS # BLD: 3.29 E9/L (ref 1.8–7.3)
NEUTS SEG NFR BLD: 70.8 % (ref 43–80)
NITRITE UR QL STRIP: NEGATIVE
PH UR STRIP: 5 [PH] (ref 5–9)
PLATELET # BLD AUTO: 324 E9/L (ref 130–450)
PMV BLD AUTO: 9.5 FL (ref 7–12)
POTASSIUM SERPL-SCNC: 4.2 MMOL/L (ref 3.5–5)
PROT SERPL-MCNC: 7.2 G/DL (ref 6.4–8.3)
PROT UR STRIP-MCNC: NEGATIVE MG/DL
RBC # BLD AUTO: 4.26 E12/L (ref 3.5–5.5)
RBC #/AREA URNS HPF: NORMAL /HPF (ref 0–2)
SODIUM SERPL-SCNC: 139 MMOL/L (ref 132–146)
SP GR UR STRIP: >=1.03 (ref 1–1.03)
UROBILINOGEN UR STRIP-ACNC: 0.2 E.U./DL
WBC # BLD: 4.7 E9/L (ref 4.5–11.5)
WBC #/AREA URNS HPF: NORMAL /HPF (ref 0–5)

## 2023-04-06 PROCEDURE — 81001 URINALYSIS AUTO W/SCOPE: CPT

## 2023-04-06 PROCEDURE — 86140 C-REACTIVE PROTEIN: CPT

## 2023-04-06 PROCEDURE — 36415 COLL VENOUS BLD VENIPUNCTURE: CPT

## 2023-04-06 PROCEDURE — 85025 COMPLETE CBC W/AUTO DIFF WBC: CPT

## 2023-04-06 PROCEDURE — 82085 ASSAY OF ALDOLASE: CPT

## 2023-04-06 PROCEDURE — 85651 RBC SED RATE NONAUTOMATED: CPT

## 2023-04-06 PROCEDURE — 82550 ASSAY OF CK (CPK): CPT

## 2023-04-06 PROCEDURE — 80053 COMPREHEN METABOLIC PANEL: CPT

## 2023-04-08 LAB — ALDOLASE SERPL-CCNC: 3.9 U/L (ref 1.2–7.6)

## 2023-04-17 DIAGNOSIS — I47.29 NSVT (NONSUSTAINED VENTRICULAR TACHYCARDIA) (HCC): ICD-10-CM

## 2023-04-17 RX ORDER — FLECAINIDE ACETATE 100 MG/1
100 TABLET ORAL EVERY 8 HOURS
Qty: 270 TABLET | Refills: 1 | Status: SHIPPED | OUTPATIENT
Start: 2023-04-17 | End: 2023-07-16

## 2023-04-17 RX ORDER — FLECAINIDE ACETATE 100 MG/1
100 TABLET ORAL EVERY 8 HOURS
Qty: 270 TABLET | Refills: 1 | Status: SHIPPED
Start: 2023-04-17 | End: 2023-04-17 | Stop reason: SDUPTHER

## 2023-05-22 ENCOUNTER — HOSPITAL ENCOUNTER (OUTPATIENT)
Age: 73
Discharge: HOME | End: 2023-05-22
Payer: MEDICARE

## 2023-05-22 DIAGNOSIS — M35.9 POLYMYOSITIS ASSOCIATED WITH AUTOIMMUNE DISEASE (HCC): ICD-10-CM

## 2023-05-22 DIAGNOSIS — M33.20 POLYMYOSITIS ASSOCIATED WITH AUTOIMMUNE DISEASE (HCC): ICD-10-CM

## 2023-05-22 DIAGNOSIS — D84.9 IMMUNOSUPPRESSION (HCC): ICD-10-CM

## 2023-05-22 DIAGNOSIS — M35.00 SJOGREN'S SYNDROME WITHOUT EXTRAGLANDULAR INVOLVEMENT (HCC): ICD-10-CM

## 2023-05-22 DIAGNOSIS — M85.89 OSTEOPENIA OF MULTIPLE SITES: ICD-10-CM

## 2023-05-22 LAB
ALBUMIN SERPL-MCNC: 4.2 G/DL (ref 3.5–5.2)
ALP SERPL-CCNC: 60 U/L (ref 35–104)
ALT SERPL-CCNC: 18 U/L (ref 0–32)
ANION GAP SERPL CALCULATED.3IONS-SCNC: 11 MMOL/L (ref 7–16)
AST SERPL-CCNC: 27 U/L (ref 0–31)
BASOPHILS # BLD: 0.03 E9/L (ref 0–0.2)
BASOPHILS NFR BLD: 0.5 % (ref 0–2)
BILIRUB SERPL-MCNC: 0.5 MG/DL (ref 0–1.2)
BUN SERPL-MCNC: 18 MG/DL (ref 6–23)
CALCIUM SERPL-MCNC: 8.7 MG/DL (ref 8.6–10.2)
CHLORIDE SERPL-SCNC: 105 MMOL/L (ref 98–107)
CK SERPL-CCNC: 392 U/L (ref 20–180)
CO2 SERPL-SCNC: 25 MMOL/L (ref 22–29)
CREAT SERPL-MCNC: 0.7 MG/DL (ref 0.5–1)
CRP SERPL HS-MCNC: 0.3 MG/DL (ref 0–0.4)
EOSINOPHIL # BLD: 0.08 E9/L (ref 0.05–0.5)
EOSINOPHIL NFR BLD: 1.4 % (ref 0–6)
ERYTHROCYTE [DISTWIDTH] IN BLOOD BY AUTOMATED COUNT: 11.9 FL (ref 11.5–15)
ERYTHROCYTE [SEDIMENTATION RATE] IN BLOOD BY WESTERGREN METHOD: 12 MM/HR (ref 0–20)
GLUCOSE SERPL-MCNC: 122 MG/DL (ref 74–99)
HCT VFR BLD AUTO: 42.2 % (ref 34–48)
HGB BLD-MCNC: 13.9 G/DL (ref 11.5–15.5)
IMM GRANULOCYTES # BLD: 0.01 E9/L
IMM GRANULOCYTES NFR BLD: 0.2 % (ref 0–5)
LYMPHOCYTES # BLD: 0.85 E9/L (ref 1.5–4)
LYMPHOCYTES NFR BLD: 14.5 % (ref 20–42)
MCH RBC QN AUTO: 32.4 PG (ref 26–35)
MCHC RBC AUTO-ENTMCNC: 32.9 % (ref 32–34.5)
MCV RBC AUTO: 98.4 FL (ref 80–99.9)
MONOCYTES # BLD: 0.67 E9/L (ref 0.1–0.95)
MONOCYTES NFR BLD: 11.5 % (ref 2–12)
NEUTROPHILS # BLD: 4.21 E9/L (ref 1.8–7.3)
NEUTS SEG NFR BLD: 71.9 % (ref 43–80)
PLATELET # BLD AUTO: 329 E9/L (ref 130–450)
PMV BLD AUTO: 9.5 FL (ref 7–12)
POTASSIUM SERPL-SCNC: 4.5 MMOL/L (ref 3.5–5)
PROT SERPL-MCNC: 7 G/DL (ref 6.4–8.3)
RBC # BLD AUTO: 4.29 E12/L (ref 3.5–5.5)
SODIUM SERPL-SCNC: 141 MMOL/L (ref 132–146)
VITAMIN D 25-HYDROXY: 42 NG/ML (ref 30–100)
WBC # BLD: 5.9 E9/L (ref 4.5–11.5)

## 2023-05-22 PROCEDURE — 36415 COLL VENOUS BLD VENIPUNCTURE: CPT

## 2023-05-22 PROCEDURE — 82306 VITAMIN D 25 HYDROXY: CPT

## 2023-05-22 PROCEDURE — 86140 C-REACTIVE PROTEIN: CPT

## 2023-05-22 PROCEDURE — 85025 COMPLETE CBC W/AUTO DIFF WBC: CPT

## 2023-05-22 PROCEDURE — 82085 ASSAY OF ALDOLASE: CPT

## 2023-05-22 PROCEDURE — 80053 COMPREHEN METABOLIC PANEL: CPT

## 2023-05-22 PROCEDURE — 85651 RBC SED RATE NONAUTOMATED: CPT

## 2023-05-22 PROCEDURE — 82550 ASSAY OF CK (CPK): CPT

## 2023-05-24 LAB — ALDOLASE SERPL-CCNC: 2.5 U/L (ref 1.2–7.6)

## 2023-07-06 ENCOUNTER — TELEPHONE (OUTPATIENT)
Dept: NON INVASIVE DIAGNOSTICS | Age: 73
End: 2023-07-06

## 2023-07-10 ENCOUNTER — ANESTHESIA EVENT (OUTPATIENT)
Dept: OPERATING ROOM | Age: 73
End: 2023-07-10
Payer: MEDICARE

## 2023-07-10 NOTE — PROGRESS NOTES
Reviewed cardiac history and focus note by patient dated 7/6/2023 with Dr Laci Bahena - also have attempted to contact patient to follow up on issues described in her focus note from 7/6/2023 - have not been able to contact patient  - Dr Laci Bahena stated no further pre-op testing or documentation is required - patient will be evaluated DOS and if she presents with symptoms as described in the patient's focus note from 7/6/2023, she could be cancelled - voice message was left at Dr Trinna Simmonds reporting the above. Patient called back - states she does not have any dizziness or lightheadedness  but is still having issues with low heart rate and fatigue. Patient was informed of the review of information with Dr Laci Bahena and the possibility of being cancelled. If during her preop anesthesia assessment, there is any determination that her cardiac health is not optimized for her to receive anesthesia. Patient stated she understood and felt that was reasonable. Basilio Stauffer RN reports that Gustavo Spivey from Dr Trinna Simmonds office called back to report that Dr Wendy Hughes had been informed of the information in the first paragraph above and Dr Wendy Hughes stated the case could be done under straight local.    Spoke to Dr Laci Bahena to update her on the information regarding the possibility of  straight local anesthesia - no new orders received - just commented that a decision of use of local anesthetic would be determined DOS - Also informed Devan Winters.

## 2023-07-10 NOTE — H&P
History and Physical  Sol Falcon MD    Agree with H&P. Will proceed with surgery. Chief Complaint     Pain is a 1/10     History of Present Illness    Polina Elizabeth is a 64-year-old right-hand-dominant female who comes in today for a 3-week postop of her left middle trigger finger release. She has been having bilateral hand numbness and tingling from her thumb to ring finger. She states her right hand is worse than her left. She has having weakness. She is having pain at night. The patient's symptoms are worse with gripping and pinching. She has been taking pain medication. She has been bracing. She has been doing therapy. She has not tried injections. The patient's symptoms are worse with activities, and they are better at rest.  The pain is an ache. The patient is feeling worse on her right hand.       Past Medical History - reviewed  Anemia,Diabetes,GERD/heartburn,Osteoporosis,Thyroid disease,Other illness    Family Medical History - reviewed  Clinically insignificant    Surgical History - reviewed  Rotator cuff repair left,Rotator cuff repair right,Arthroscopic shoulder left,Arthroscopic shoulder right,lumbar laminectomy L4L5 L5S1  left middle finger trigger release MPM 4/4/23    Social History - reviewed  Hand dominance: right  Occupation: RETIRED    Risk Factors - reviewed  Patient had a flu shot in the last 12 months? yes  Date of last shot: 1025/2022  The patient is 72 years or older and has had a pneumonia vaccine: no  Patient has an implant none  Tobacco status: former smoker  Alcohol use: no  Does patient exercise? yes  How often does patient exercise? daily /wk  In the past 12 months, have you fallen? no  Have you had cervical cancer screening in the last 12 months ? n/a  Have you had breast cancer screening in the last 12 months?   n/a  Have you had colorectal cancer screening in the last 12 months?   no        Current Medications (including meds started today):   * Protonix 40 mg

## 2023-07-11 ENCOUNTER — ANESTHESIA (OUTPATIENT)
Dept: OPERATING ROOM | Age: 73
End: 2023-07-11
Payer: MEDICARE

## 2023-07-11 ENCOUNTER — HOSPITAL ENCOUNTER (OUTPATIENT)
Age: 73
Setting detail: OUTPATIENT SURGERY
Discharge: HOME | End: 2023-07-11
Attending: ORTHOPAEDIC SURGERY | Admitting: ORTHOPAEDIC SURGERY
Payer: MEDICARE

## 2023-07-11 VITALS
DIASTOLIC BLOOD PRESSURE: 67 MMHG | TEMPERATURE: 97.4 F | BODY MASS INDEX: 21.22 KG/M2 | HEIGHT: 68 IN | WEIGHT: 140 LBS | RESPIRATION RATE: 16 BRPM | HEART RATE: 56 BPM | OXYGEN SATURATION: 97 % | SYSTOLIC BLOOD PRESSURE: 142 MMHG

## 2023-07-11 LAB — METER GLUCOSE: 110 MG/DL (ref 74–99)

## 2023-07-11 PROCEDURE — 2709999900 HC NON-CHARGEABLE SUPPLY: Performed by: ORTHOPAEDIC SURGERY

## 2023-07-11 PROCEDURE — 7100000010 HC PHASE II RECOVERY - FIRST 15 MIN: Performed by: ORTHOPAEDIC SURGERY

## 2023-07-11 PROCEDURE — 2580000003 HC RX 258: Performed by: ORTHOPAEDIC SURGERY

## 2023-07-11 PROCEDURE — 6360000002 HC RX W HCPCS: Performed by: ORTHOPAEDIC SURGERY

## 2023-07-11 PROCEDURE — 2500000003 HC RX 250 WO HCPCS: Performed by: ORTHOPAEDIC SURGERY

## 2023-07-11 PROCEDURE — 3700000001 HC ADD 15 MINUTES (ANESTHESIA): Performed by: ORTHOPAEDIC SURGERY

## 2023-07-11 PROCEDURE — 2720000010 HC SURG SUPPLY STERILE: Performed by: ORTHOPAEDIC SURGERY

## 2023-07-11 PROCEDURE — 3600000002 HC SURGERY LEVEL 2 BASE: Performed by: ORTHOPAEDIC SURGERY

## 2023-07-11 PROCEDURE — 7100000011 HC PHASE II RECOVERY - ADDTL 15 MIN: Performed by: ORTHOPAEDIC SURGERY

## 2023-07-11 PROCEDURE — 6360000002 HC RX W HCPCS

## 2023-07-11 PROCEDURE — 3600000012 HC SURGERY LEVEL 2 ADDTL 15MIN: Performed by: ORTHOPAEDIC SURGERY

## 2023-07-11 PROCEDURE — 2580000003 HC RX 258

## 2023-07-11 PROCEDURE — 3700000000 HC ANESTHESIA ATTENDED CARE: Performed by: ORTHOPAEDIC SURGERY

## 2023-07-11 PROCEDURE — 82962 GLUCOSE BLOOD TEST: CPT

## 2023-07-11 RX ORDER — SODIUM CHLORIDE 0.9 % (FLUSH) 0.9 %
5-40 SYRINGE (ML) INJECTION PRN
Status: DISCONTINUED | OUTPATIENT
Start: 2023-07-11 | End: 2023-07-11 | Stop reason: HOSPADM

## 2023-07-11 RX ORDER — SODIUM CHLORIDE 9 MG/ML
INJECTION, SOLUTION INTRAVENOUS PRN
Status: DISCONTINUED | OUTPATIENT
Start: 2023-07-11 | End: 2023-07-11 | Stop reason: HOSPADM

## 2023-07-11 RX ORDER — SODIUM CHLORIDE 9 MG/ML
INJECTION, SOLUTION INTRAVENOUS CONTINUOUS
Status: DISCONTINUED | OUTPATIENT
Start: 2023-07-11 | End: 2023-07-11 | Stop reason: HOSPADM

## 2023-07-11 RX ORDER — SODIUM CHLORIDE 0.9 % (FLUSH) 0.9 %
5-40 SYRINGE (ML) INJECTION EVERY 12 HOURS SCHEDULED
Status: DISCONTINUED | OUTPATIENT
Start: 2023-07-11 | End: 2023-07-11 | Stop reason: HOSPADM

## 2023-07-11 RX ORDER — TRAMADOL HYDROCHLORIDE 50 MG/1
50 TABLET ORAL PRN
Status: DISCONTINUED | OUTPATIENT
Start: 2023-07-11 | End: 2023-07-11 | Stop reason: HOSPADM

## 2023-07-11 RX ORDER — PROPOFOL 10 MG/ML
INJECTION, EMULSION INTRAVENOUS CONTINUOUS PRN
Status: DISCONTINUED | OUTPATIENT
Start: 2023-07-11 | End: 2023-07-11 | Stop reason: SDUPTHER

## 2023-07-11 RX ORDER — SODIUM CHLORIDE 9 MG/ML
INJECTION, SOLUTION INTRAVENOUS CONTINUOUS PRN
Status: DISCONTINUED | OUTPATIENT
Start: 2023-07-11 | End: 2023-07-11 | Stop reason: SDUPTHER

## 2023-07-11 RX ORDER — TRAMADOL HYDROCHLORIDE 50 MG/1
100 TABLET ORAL PRN
Status: DISCONTINUED | OUTPATIENT
Start: 2023-07-11 | End: 2023-07-11 | Stop reason: HOSPADM

## 2023-07-11 RX ADMIN — WATER 2000 MG: 1 INJECTION INTRAMUSCULAR; INTRAVENOUS; SUBCUTANEOUS at 13:20

## 2023-07-11 RX ADMIN — SODIUM CHLORIDE: 9 INJECTION, SOLUTION INTRAVENOUS at 13:16

## 2023-07-11 RX ADMIN — PROPOFOL 70 MCG/KG/MIN: 10 INJECTION, EMULSION INTRAVENOUS at 13:21

## 2023-07-11 ASSESSMENT — PAIN DESCRIPTION - DESCRIPTORS: DESCRIPTORS: DISCOMFORT

## 2023-07-11 ASSESSMENT — PAIN DESCRIPTION - PAIN TYPE: TYPE: SURGICAL PAIN

## 2023-07-11 ASSESSMENT — PAIN - FUNCTIONAL ASSESSMENT
PAIN_FUNCTIONAL_ASSESSMENT: WONG-BAKER FACES
PAIN_FUNCTIONAL_ASSESSMENT: ACTIVITIES ARE NOT PREVENTED

## 2023-07-11 ASSESSMENT — PAIN DESCRIPTION - ORIENTATION: ORIENTATION: RIGHT

## 2023-07-11 ASSESSMENT — PAIN SCALES - WONG BAKER
WONGBAKER_NUMERICALRESPONSE: 0
WONGBAKER_NUMERICALRESPONSE: 0

## 2023-07-11 ASSESSMENT — PAIN DESCRIPTION - LOCATION: LOCATION: HAND

## 2023-07-11 ASSESSMENT — PAIN DESCRIPTION - FREQUENCY: FREQUENCY: CONTINUOUS

## 2023-07-11 ASSESSMENT — PAIN SCALES - GENERAL
PAINLEVEL_OUTOF10: 3
PAINLEVEL_OUTOF10: 3

## 2023-07-11 ASSESSMENT — PAIN DESCRIPTION - ONSET: ONSET: ON-GOING

## 2023-07-11 NOTE — ANESTHESIA PRE PROCEDURE
Department of Anesthesiology  Preprocedure Note       Name:  Nas Patel   Age:  71 y.o.  :  1950                                          MRN:  [id number]         Date:  2023      Surgeon: Stuart Stephens    Procedure: Ablation with Anesthesia    Medications prior to admission:   Prior to Admission medications    Medication Sig Start Date End Date Taking? Authorizing Provider   flecainide (TAMBOCOR) 100 MG tablet Take 1 tablet by mouth in the morning and 1 tablet at noon and 1 tablet in the evening. 23  GENIA Curran CNP   meloxicam (MOBIC) 7.5 MG tablet Take 1-2 tabs daily as need with food  Patient not taking: Reported on 2023   Paola Craig DO   metoprolol succinate (TOPROL XL) 50 MG extended release tablet Take 1 tablet by mouth 2 times daily at 0800 and 1400 10/19/22   GENIA Curran CNP   vitamin B-12 (CYANOCOBALAMIN) 500 MCG tablet Take 1 tablet by mouth daily  Patient not taking: Reported on 2023   Divya Siddiqi MD   calcium carbonate (OSCAL) 500 MG TABS tablet Take 1 tablet by mouth 2 times daily    Historical Provider, MD   metFORMIN (GLUCOPHAGE-XR) 500 MG extended release tablet Take 1 tablet by mouth every evening    Historical Provider, MD   LANTUS SOLOSTAR 100 UNIT/ML injection pen Inject 10 Units into the skin nightly 20   Historical Provider, MD   rosuvastatin (CRESTOR) 10 MG tablet Take 1 tablet by mouth nightly    Historical Provider, MD   levothyroxine (SYNTHROID) 100 MCG tablet Take 1 tablet by mouth Six times weekly Given Monday,Tuesday,Wednesday,Thursday,Friday,Saturday  Takes 150 mcg on     Historical Provider, MD   vitamin D (CHOLECALCIFEROL) 1000 UNITS TABS tablet Take 1 tablet by mouth daily  Patient not taking: Reported on 2023    Historical Provider, MD       Current medications:    No current outpatient medications on file. No current facility-administered medications for this visit.

## 2023-07-11 NOTE — DISCHARGE INSTRUCTIONS
Pain medication is sent to pharmacy. Weightbearing status is limited to right arm. Keep dressing clean and dry for 2 days postop. Then, OK to take off dressing and to get wound wet with clean water (shower). Do not get wound wet with dirty water (dishwater). Do not tub soak wound for 1 month (pool, bath). Follow up with Dr. River Rios in 2 weeks.

## 2023-07-12 NOTE — OP NOTE
[address]                  [address]                                OPERATIVE REPORT    PATIENT NAME: Mary Grace Coronado                        :        1950  MED REC NO:   [id number]                            ROOM:  ACCOUNT NO:   [de-identified]                           ADMIT DATE: 2023  PROVIDER:     Nirmal Sommer    DATE OF PROCEDURE:  2023    PREOPERATIVE DIAGNOSIS:  Right carpal tunnel syndrome. POSTOPERATIVE DIAGNOSIS:  Right carpal tunnel syndrome. PROCEDURE PERFORMED:  Right endoscopic carpal tunnel release. COMPLICATIONS:  None. ANTIBIOTICS:  See record. ESTIMATED BLOOD LOSS:  Minimal.    ANESTHESIA:  Local plus MAC.    TOURNIQUET TIME:  5 minutes. INDICATIONS:  This is a 41-year-old female diagnosed with right carpal  tunnel syndrome based on history and physical.  After discussing the  risks and benefits including nonoperative treatment, the patient wished  to undergo the procedure listed above. DESCRIPTION OF PROCEDURE:  The patient was brought to the operating room  on her cart. Hand table was placed. Tourniquet was placed on the right  upper arm. She underwent local anesthesia. She was prepped and draped  in the sterile fashion using Betadine. Time-out was performed. Skin  was incised with a knife transversely about the wrist crease. Subcutaneous tissues were dissected with tenotomy scissors. Palmaris  longus was identified. Sherrye Daron in the forearm fascia was made. Distal  based flap was created. Proximal aspect of the forearm fascia was  released. Attention was then turned to the carpal tunnel. This was  entered with a spatula and the undersurface of the ligament was  roughened. Dilators were placed. The scope was placed. Transverse  carpal ligament was identified. This was released in a distal to  proximal direction. Good release of the ligament was obtained.    Pictures were

## 2023-07-12 NOTE — ANESTHESIA POSTPROCEDURE EVALUATION
Department of Anesthesiology  Postprocedure Note    Patient: Pranav Rivera  MRN: [id number]  YOB: 1950  Date of evaluation: 7/12/2023      Procedure Summary     Date: 07/11/23 Room / Location: [facility] 05 / [address] [facility] [facility]    Anesthesia Start: 1315 Anesthesia Stop: 7355    Procedure: RIGHT ENDOSCOPIC CARPAL TUNNEL RELEASE (Right: Arm Lower) Diagnosis:       Carpal tunnel syndrome of right wrist      (Carpal tunnel syndrome of right wrist [G56.01])    Surgeons: Mile Phillips MD Responsible Provider: Shanika Corado MD    Anesthesia Type: MAC ASA Status: 3          Anesthesia Type: No value filed.     Indy Phase I: Indy Score: 10    Indy Phase II: Indy Score: 10      Anesthesia Post Evaluation    Patient location during evaluation: PACU  Patient participation: complete - patient participated  Level of consciousness: awake and alert  Airway patency: patent  Nausea & Vomiting: no nausea and no vomiting  Complications: no  Cardiovascular status: hemodynamically stable  Respiratory status: acceptable  Hydration status: euvolemic  Multimodal analgesia pain management approach

## 2023-07-19 ENCOUNTER — OFFICE VISIT (OUTPATIENT)
Dept: RHEUMATOLOGY | Age: 73
End: 2023-07-19
Payer: MEDICARE

## 2023-07-19 VITALS — WEIGHT: 140 LBS | HEIGHT: 68 IN | BODY MASS INDEX: 21.22 KG/M2

## 2023-07-19 DIAGNOSIS — M35.00 SJOGREN'S SYNDROME WITHOUT EXTRAGLANDULAR INVOLVEMENT (HCC): ICD-10-CM

## 2023-07-19 DIAGNOSIS — I47.29 NSVT (NONSUSTAINED VENTRICULAR TACHYCARDIA) (HCC): ICD-10-CM

## 2023-07-19 DIAGNOSIS — E11.9 TYPE II DIABETES MELLITUS, WELL CONTROLLED (HCC): ICD-10-CM

## 2023-07-19 DIAGNOSIS — M33.20 POLYMYOSITIS ASSOCIATED WITH AUTOIMMUNE DISEASE (HCC): ICD-10-CM

## 2023-07-19 DIAGNOSIS — M15.9 GENERALIZED OSTEOARTHRITIS: ICD-10-CM

## 2023-07-19 DIAGNOSIS — M85.89 OSTEOPENIA OF MULTIPLE SITES: ICD-10-CM

## 2023-07-19 DIAGNOSIS — M35.9 POLYMYOSITIS ASSOCIATED WITH AUTOIMMUNE DISEASE (HCC): Primary | ICD-10-CM

## 2023-07-19 DIAGNOSIS — M33.20 POLYMYOSITIS ASSOCIATED WITH AUTOIMMUNE DISEASE (HCC): Primary | ICD-10-CM

## 2023-07-19 DIAGNOSIS — M35.9 POLYMYOSITIS ASSOCIATED WITH AUTOIMMUNE DISEASE (HCC): ICD-10-CM

## 2023-07-19 LAB
ABSOLUTE IMMATURE GRANULOCYTE: 0.03 K/UL (ref 0–0.58)
ALBUMIN SERPL-MCNC: 4.4 G/DL (ref 3.5–5.2)
ALP BLD-CCNC: 72 U/L (ref 35–104)
ALT SERPL-CCNC: 12 U/L (ref 0–32)
ANION GAP SERPL CALCULATED.3IONS-SCNC: 12 MMOL/L (ref 7–16)
AST SERPL-CCNC: 24 U/L (ref 0–31)
BASOPHILS ABSOLUTE: 0.02 K/UL (ref 0–0.2)
BASOPHILS RELATIVE PERCENT: 0 % (ref 0–2)
BILIRUB SERPL-MCNC: 0.4 MG/DL (ref 0–1.2)
BUN BLDV-MCNC: 17 MG/DL (ref 6–23)
C-REACTIVE PROTEIN: 9 MG/L (ref 0–5)
CALCIUM SERPL-MCNC: 8.9 MG/DL (ref 8.6–10.2)
CHLORIDE BLD-SCNC: 101 MMOL/L (ref 98–107)
CO2: 26 MMOL/L (ref 22–29)
CREAT SERPL-MCNC: 0.6 MG/DL (ref 0.5–1)
EOSINOPHILS ABSOLUTE: 0.05 K/UL (ref 0.05–0.5)
EOSINOPHILS RELATIVE PERCENT: 1 % (ref 0–6)
GFR SERPL CREATININE-BSD FRML MDRD: >60 ML/MIN/1.73M2
GLUCOSE BLD-MCNC: 182 MG/DL (ref 74–99)
HCT VFR BLD CALC: 42.6 % (ref 34–48)
HEMOGLOBIN: 13.6 G/DL (ref 11.5–15.5)
IMMATURE GRANULOCYTES: 0 % (ref 0–5)
LYMPHOCYTES ABSOLUTE: 1.03 K/UL (ref 1.5–4)
LYMPHOCYTES RELATIVE PERCENT: 15 % (ref 20–42)
MCH RBC QN AUTO: 32.4 PG (ref 26–35)
MCHC RBC AUTO-ENTMCNC: 31.9 G/DL (ref 32–34.5)
MCV RBC AUTO: 101.4 FL (ref 80–99.9)
MONOCYTES ABSOLUTE: 0.64 K/UL (ref 0.1–0.95)
MONOCYTES RELATIVE PERCENT: 9 % (ref 2–12)
NEUTROPHILS ABSOLUTE: 5.12 K/UL (ref 1.8–7.3)
NEUTROPHILS RELATIVE PERCENT: 74 % (ref 43–80)
PDW BLD-RTO: 11.9 % (ref 11.5–15)
PLATELET # BLD: 372 K/UL (ref 130–450)
PMV BLD AUTO: 9.9 FL (ref 7–12)
POTASSIUM SERPL-SCNC: 4.3 MMOL/L (ref 3.5–5)
RBC # BLD: 4.2 M/UL (ref 3.5–5.5)
SEDIMENTATION RATE, ERYTHROCYTE: 25 MM/HR (ref 0–20)
SODIUM BLD-SCNC: 139 MMOL/L (ref 132–146)
TOTAL CK: 282 U/L (ref 20–180)
TOTAL PROTEIN: 7.4 G/DL (ref 6.4–8.3)
WBC # BLD: 6.9 K/UL (ref 4.5–11.5)

## 2023-07-19 PROCEDURE — G8428 CUR MEDS NOT DOCUMENT: HCPCS | Performed by: INTERNAL MEDICINE

## 2023-07-19 PROCEDURE — 3044F HG A1C LEVEL LT 7.0%: CPT | Performed by: INTERNAL MEDICINE

## 2023-07-19 PROCEDURE — 3017F COLORECTAL CA SCREEN DOC REV: CPT | Performed by: INTERNAL MEDICINE

## 2023-07-19 PROCEDURE — G8399 PT W/DXA RESULTS DOCUMENT: HCPCS | Performed by: INTERNAL MEDICINE

## 2023-07-19 PROCEDURE — 99215 OFFICE O/P EST HI 40 MIN: CPT | Performed by: INTERNAL MEDICINE

## 2023-07-19 PROCEDURE — 1090F PRES/ABSN URINE INCON ASSESS: CPT | Performed by: INTERNAL MEDICINE

## 2023-07-19 PROCEDURE — G8420 CALC BMI NORM PARAMETERS: HCPCS | Performed by: INTERNAL MEDICINE

## 2023-07-19 PROCEDURE — 2022F DILAT RTA XM EVC RTNOPTHY: CPT | Performed by: INTERNAL MEDICINE

## 2023-07-19 PROCEDURE — 1036F TOBACCO NON-USER: CPT | Performed by: INTERNAL MEDICINE

## 2023-07-19 PROCEDURE — 1123F ACP DISCUSS/DSCN MKR DOCD: CPT | Performed by: INTERNAL MEDICINE

## 2023-07-19 ASSESSMENT — ENCOUNTER SYMPTOMS
COUGH: 0
DIARRHEA: 0
NAUSEA: 0
SHORTNESS OF BREATH: 0
ABDOMINAL PAIN: 0
TROUBLE SWALLOWING: 0
COLOR CHANGE: 0
VOMITING: 0

## 2023-07-19 NOTE — PROGRESS NOTES
Patient states that in 2016 she started having muscle weakness. She had trouble getting up from a seated position. She had a lot of pain associated as well. There was concern for PMR and she was placed on prednisone and did really well. 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 She apparently had an EMG and muscle biopsy at [facility] which were both unrevealing. Nevertheless she was started on methotrexate by her previous rheumatologist.  She did not tolerate it and was then placed on Imuran. She did not tolerate 100 mg twice daily but has been on 50 mg twice daily for some time now and tolerates that well. Her prednisone was also tapered down to 5 mg daily and she did pretty well for the most part. She then went into V. tach this previous may and her prednisone was actually held. She felt like she did pretty much just as well off of the prednisone so only went back on 2.5 mg daily. She states that aside from some joint aches from osteoarthritis she for the most part does pretty well. She does have some weakness in the left leg which may be related to significant degenerative changes in the low back. She has not had fever or rash. She does occasionally have some trouble swallowing. She does not have Raynaud's, chest pain, or shortness of breath. Most recent CKs have been in the 200 range. Past Medical History:   Diagnosis Date    Carpal tunnel syndrome     right    Celiac disease     Diabetes mellitus (HCC)     Palpitations     Polymyositis (HCC)     PONV (postoperative nausea and vomiting)     Trigger finger, left middle finger         Review of Systems   Constitutional:  Negative for fatigue and fever. HENT:  Negative for mouth sores and trouble swallowing. Respiratory:  Negative for cough and shortness of breath. Cardiovascular:  Negative for chest pain.    Gastrointestinal:  Negative for abdominal pain, diarrhea, nausea

## 2023-07-21 LAB — ALDOLASE: 5.1 U/L (ref 1.2–7.6)

## 2023-07-22 DIAGNOSIS — M35.9 POLYMYOSITIS ASSOCIATED WITH AUTOIMMUNE DISEASE (HCC): Primary | ICD-10-CM

## 2023-07-22 DIAGNOSIS — M33.20 POLYMYOSITIS ASSOCIATED WITH AUTOIMMUNE DISEASE (HCC): Primary | ICD-10-CM

## 2023-07-22 DIAGNOSIS — M15.9 GENERALIZED OSTEOARTHRITIS: ICD-10-CM

## 2023-07-24 RX ORDER — MELOXICAM 7.5 MG/1
TABLET ORAL
Qty: 60 TABLET | Refills: 3 | Status: SHIPPED | OUTPATIENT
Start: 2023-07-24

## 2023-08-23 ENCOUNTER — HOSPITAL ENCOUNTER (OUTPATIENT)
Age: 73
Discharge: HOME | End: 2023-08-23
Payer: MEDICARE

## 2023-08-23 LAB
T4 FREE SERPL-MCNC: 1.4 NG/DL (ref 0.9–1.7)
TSH SERPL DL<=0.05 MIU/L-ACNC: 2.21 UIU/ML (ref 0.27–4.2)

## 2023-08-23 PROCEDURE — 84439 ASSAY OF FREE THYROXINE: CPT

## 2023-08-23 PROCEDURE — 84443 ASSAY THYROID STIM HORMONE: CPT

## 2023-10-12 NOTE — PROGRESS NOTES
[address] [facility] PRE-ADMISSION TESTING INSTRUCTIONS    The Preadmission Testing patient is instructed accordingly using the following criteria (check applicable):    ARRIVAL INSTRUCTIONS:  [x] Parking the day of Surgery is located in the Main Entrance lot. Upon entering the door, make an immediate right to the surgery reception desk    [x] Bring photo ID and insurance card    [] Bring in a copy of Living will or Durable Power of  papers. [x] Please be sure to arrange for responsible adult to provide transportation to and from the hospital    [x] Please arrange for responsible adult to be with you for the 24 hour period post procedure due to having anesthesia    [x] If you awake am of surgery not feeling well or have temperature >100 please call [phone]    GENERAL INSTRUCTIONS:    [x] Nothing by mouth after midnight, including gum, candy, mints or water    [x] You may brush your teeth, but do not swallow any water    [x] Take medications as instructed with 1-2 oz of water    [x] Stop herbal supplements and vitamins 5 days prior to procedure    [x] Follow preop dosing of blood thinners per physician instructions    [x] Take 1/2 dose of evening insulin, but no insulin after midnight    [x] No oral diabetic medications after midnight    [x] If diabetic and have low blood sugar or feel symptomatic, take 1-2oz apple juice only    [] Bring inhalers day of surgery    [] Bring C-PAP/ Bi-Pap day of surgery    [] Bring urine specimen day of surgery    [x] Shower or bath with soap, lather and rinse well, AM of Surgery, no lotion, powders or creams to surgical site    [] Follow bowel prep as instructed per surgeon    [x] No tobacco products within 24 hours of surgery     [x] No alcohol or illegal drug use within 24 hours of surgery.     [x] Jewelry, body piercing's, eyeglasses, contact lenses and dentures are not permitted into surgery (bring cases)      [x] Please do not wear any nail

## 2023-10-16 NOTE — PROGRESS NOTES
Patient called and refused acetaminophen codeine because she has some leftover from her last hand surgery. She does not want her scripts sent here.  She uses [facility]

## 2023-10-16 NOTE — H&P
last 12 months ? n/a  Have you had breast cancer screening in the last 12 months?   n/a  Have you had colorectal cancer screening in the last 12 months?   no      Current Medications (including meds started today):   * Protonix 40 mg tablet,delayed release   * Sectral 200 mg Cap   * Synthroid 150 mcg Tab   tramadol 50 mg tablet (tramadol) Take 1 tablet by mouth every six hours as needed for pain   metformin 500 mg tablet extended release 24 hr (metformin)   flecainide 100 mg tablet (flecainide)   Lantus Solostar U-100 Insulin 100 unit/mL (3 mL) insulin pen (insulin glargine)   prednisone 2.5 mg tablet (prednisone)   levothyroxine 100 mcg tablet (levothyroxine)   azathioprine 50 mg tablet (azathioprine)   metoprolol succinate 50 mg tablet extended release 24 hr (metoprolol succinate)       Current Allergies (reviewed this update):  * GLUTEN (Critical)  * SULFA (SULFONAMIDE ANTIBIOTICS) (Critical)  * TETRACYCLINE (Critical)      Vital Signs   Weight: 141.01 lbs. (63.96 kg.)  Height: 68 in.    (172.72 cm.)  Body Mass Index: 21.44  Body Surface Area: 1.76 m2      Review of Systems   General: Positive for weight loss. Eyes: Positive for NONE.    ENT: Positive for NONE. Cardiovascular: Positive for NONE. Respiratory: Positive for NONE. Gastrointestinal: Positive for NONE. Genitourinary: Positive for NONE. Musculoskeletal: Positive for NONE. Skin: Positive for NONE. Neurologic: Positive for poor balance. Psychiatric: Positive for NONE. Heme/Lymphatic: Positive for NONE. Allergic/Immunologic: Positive for NONE. The remainder of the complete review of systems was negative. Physical Exam   General Appearance:   Awake and alert  Well developed, well nourished    Eyes:   Eyes appear normal.  Extraocular movements intact. Sclerae clear.     Head:   Head normocephalic, atraumatic    Neck:   Neck soft and supple    Gait and Station:   Gait and station: no difficulty ambulating and able to

## 2023-10-17 ENCOUNTER — ANESTHESIA EVENT (OUTPATIENT)
Dept: OPERATING ROOM | Age: 73
End: 2023-10-17
Payer: MEDICARE

## 2023-10-17 ENCOUNTER — ANESTHESIA (OUTPATIENT)
Dept: OPERATING ROOM | Age: 73
End: 2023-10-17
Payer: MEDICARE

## 2023-10-17 ENCOUNTER — HOSPITAL ENCOUNTER (OUTPATIENT)
Age: 73
Setting detail: OUTPATIENT SURGERY
Discharge: HOME | End: 2023-10-17
Attending: ORTHOPAEDIC SURGERY | Admitting: ORTHOPAEDIC SURGERY
Payer: MEDICARE

## 2023-10-17 VITALS
OXYGEN SATURATION: 98 % | RESPIRATION RATE: 16 BRPM | HEART RATE: 58 BPM | BODY MASS INDEX: 21.52 KG/M2 | HEIGHT: 68 IN | DIASTOLIC BLOOD PRESSURE: 61 MMHG | SYSTOLIC BLOOD PRESSURE: 136 MMHG | TEMPERATURE: 97.4 F | WEIGHT: 142 LBS

## 2023-10-17 LAB — GLUCOSE BLD-MCNC: 103 MG/DL (ref 74–99)

## 2023-10-17 PROCEDURE — 7100000011 HC PHASE II RECOVERY - ADDTL 15 MIN: Performed by: ORTHOPAEDIC SURGERY

## 2023-10-17 PROCEDURE — 82962 GLUCOSE BLOOD TEST: CPT

## 2023-10-17 PROCEDURE — 2580000003 HC RX 258: Performed by: ORTHOPAEDIC SURGERY

## 2023-10-17 PROCEDURE — 6360000002 HC RX W HCPCS: Performed by: ORTHOPAEDIC SURGERY

## 2023-10-17 PROCEDURE — 3600000012 HC SURGERY LEVEL 2 ADDTL 15MIN: Performed by: ORTHOPAEDIC SURGERY

## 2023-10-17 PROCEDURE — 3700000001 HC ADD 15 MINUTES (ANESTHESIA): Performed by: ORTHOPAEDIC SURGERY

## 2023-10-17 PROCEDURE — 6360000002 HC RX W HCPCS: Performed by: NURSE ANESTHETIST, CERTIFIED REGISTERED

## 2023-10-17 PROCEDURE — 2709999900 HC NON-CHARGEABLE SUPPLY: Performed by: ORTHOPAEDIC SURGERY

## 2023-10-17 PROCEDURE — 3700000000 HC ANESTHESIA ATTENDED CARE: Performed by: ORTHOPAEDIC SURGERY

## 2023-10-17 PROCEDURE — 7100000010 HC PHASE II RECOVERY - FIRST 15 MIN: Performed by: ORTHOPAEDIC SURGERY

## 2023-10-17 PROCEDURE — 2500000003 HC RX 250 WO HCPCS: Performed by: ORTHOPAEDIC SURGERY

## 2023-10-17 PROCEDURE — 3600000002 HC SURGERY LEVEL 2 BASE: Performed by: ORTHOPAEDIC SURGERY

## 2023-10-17 PROCEDURE — 2720000010 HC SURG SUPPLY STERILE: Performed by: ORTHOPAEDIC SURGERY

## 2023-10-17 PROCEDURE — 2500000003 HC RX 250 WO HCPCS: Performed by: NURSE ANESTHETIST, CERTIFIED REGISTERED

## 2023-10-17 RX ORDER — PROPOFOL 10 MG/ML
INJECTION, EMULSION INTRAVENOUS PRN
Status: DISCONTINUED | OUTPATIENT
Start: 2023-10-17 | End: 2023-10-17 | Stop reason: SDUPTHER

## 2023-10-17 RX ORDER — SODIUM CHLORIDE 0.9 % (FLUSH) 0.9 %
5-40 SYRINGE (ML) INJECTION EVERY 12 HOURS SCHEDULED
Status: CANCELLED | OUTPATIENT
Start: 2023-10-17

## 2023-10-17 RX ORDER — LABETALOL HYDROCHLORIDE 5 MG/ML
10 INJECTION, SOLUTION INTRAVENOUS
Status: CANCELLED | OUTPATIENT
Start: 2023-10-17

## 2023-10-17 RX ORDER — HYDRALAZINE HYDROCHLORIDE 20 MG/ML
10 INJECTION INTRAMUSCULAR; INTRAVENOUS
Status: CANCELLED | OUTPATIENT
Start: 2023-10-17

## 2023-10-17 RX ORDER — ONDANSETRON 2 MG/ML
4 INJECTION INTRAMUSCULAR; INTRAVENOUS
Status: CANCELLED | OUTPATIENT
Start: 2023-10-17 | End: 2023-10-18

## 2023-10-17 RX ORDER — SODIUM CHLORIDE 0.9 % (FLUSH) 0.9 %
5-40 SYRINGE (ML) INJECTION EVERY 12 HOURS SCHEDULED
Status: DISCONTINUED | OUTPATIENT
Start: 2023-10-17 | End: 2023-10-17 | Stop reason: HOSPADM

## 2023-10-17 RX ORDER — SODIUM CHLORIDE 0.9 % (FLUSH) 0.9 %
5-40 SYRINGE (ML) INJECTION PRN
Status: CANCELLED | OUTPATIENT
Start: 2023-10-17

## 2023-10-17 RX ORDER — IPRATROPIUM BROMIDE AND ALBUTEROL SULFATE 2.5; .5 MG/3ML; MG/3ML
1 SOLUTION RESPIRATORY (INHALATION)
Status: CANCELLED | OUTPATIENT
Start: 2023-10-17 | End: 2023-10-18

## 2023-10-17 RX ORDER — MIDAZOLAM HYDROCHLORIDE 2 MG/2ML
2 INJECTION, SOLUTION INTRAMUSCULAR; INTRAVENOUS
Status: CANCELLED | OUTPATIENT
Start: 2023-10-17 | End: 2023-10-18

## 2023-10-17 RX ORDER — MIDAZOLAM HYDROCHLORIDE 1 MG/ML
INJECTION INTRAMUSCULAR; INTRAVENOUS PRN
Status: DISCONTINUED | OUTPATIENT
Start: 2023-10-17 | End: 2023-10-17 | Stop reason: SDUPTHER

## 2023-10-17 RX ORDER — SODIUM CHLORIDE 9 MG/ML
INJECTION, SOLUTION INTRAVENOUS CONTINUOUS
Status: CANCELLED | OUTPATIENT
Start: 2023-10-17

## 2023-10-17 RX ORDER — SODIUM CHLORIDE 9 MG/ML
INJECTION, SOLUTION INTRAVENOUS PRN
Status: CANCELLED | OUTPATIENT
Start: 2023-10-17

## 2023-10-17 RX ORDER — LIDOCAINE HYDROCHLORIDE 20 MG/ML
INJECTION, SOLUTION EPIDURAL; INFILTRATION; INTRACAUDAL; PERINEURAL PRN
Status: DISCONTINUED | OUTPATIENT
Start: 2023-10-17 | End: 2023-10-17 | Stop reason: SDUPTHER

## 2023-10-17 RX ORDER — FENTANYL CITRATE 50 UG/ML
INJECTION, SOLUTION INTRAMUSCULAR; INTRAVENOUS PRN
Status: DISCONTINUED | OUTPATIENT
Start: 2023-10-17 | End: 2023-10-17 | Stop reason: SDUPTHER

## 2023-10-17 RX ORDER — ONDANSETRON 2 MG/ML
INJECTION INTRAMUSCULAR; INTRAVENOUS PRN
Status: DISCONTINUED | OUTPATIENT
Start: 2023-10-17 | End: 2023-10-17 | Stop reason: SDUPTHER

## 2023-10-17 RX ORDER — SODIUM CHLORIDE 9 MG/ML
INJECTION, SOLUTION INTRAVENOUS PRN
Status: DISCONTINUED | OUTPATIENT
Start: 2023-10-17 | End: 2023-10-17 | Stop reason: HOSPADM

## 2023-10-17 RX ORDER — SODIUM CHLORIDE 0.9 % (FLUSH) 0.9 %
5-40 SYRINGE (ML) INJECTION PRN
Status: DISCONTINUED | OUTPATIENT
Start: 2023-10-17 | End: 2023-10-17 | Stop reason: HOSPADM

## 2023-10-17 RX ORDER — SODIUM CHLORIDE 9 MG/ML
INJECTION, SOLUTION INTRAVENOUS CONTINUOUS
Status: DISCONTINUED | OUTPATIENT
Start: 2023-10-17 | End: 2023-10-17 | Stop reason: HOSPADM

## 2023-10-17 RX ORDER — MEPERIDINE HYDROCHLORIDE 25 MG/ML
12.5 INJECTION INTRAMUSCULAR; INTRAVENOUS; SUBCUTANEOUS EVERY 5 MIN PRN
Status: CANCELLED | OUTPATIENT
Start: 2023-10-17

## 2023-10-17 RX ADMIN — WATER 2000 MG: 1 INJECTION INTRAMUSCULAR; INTRAVENOUS; SUBCUTANEOUS at 12:11

## 2023-10-17 RX ADMIN — FENTANYL CITRATE 50 MCG: 50 INJECTION, SOLUTION INTRAMUSCULAR; INTRAVENOUS at 12:04

## 2023-10-17 RX ADMIN — LIDOCAINE HYDROCHLORIDE 50 MG: 20 INJECTION, SOLUTION EPIDURAL; INFILTRATION; INTRACAUDAL; PERINEURAL at 12:10

## 2023-10-17 RX ADMIN — MIDAZOLAM 2 MG: 1 INJECTION INTRAMUSCULAR; INTRAVENOUS at 11:58

## 2023-10-17 RX ADMIN — PROPOFOL 100 MCG/KG/MIN: 10 INJECTION, EMULSION INTRAVENOUS at 12:11

## 2023-10-17 RX ADMIN — FENTANYL CITRATE 50 MCG: 50 INJECTION, SOLUTION INTRAMUSCULAR; INTRAVENOUS at 12:10

## 2023-10-17 RX ADMIN — ONDANSETRON 4 MG: 2 INJECTION INTRAMUSCULAR; INTRAVENOUS at 12:15

## 2023-10-17 RX ADMIN — PROPOFOL 50 MG: 10 INJECTION, EMULSION INTRAVENOUS at 12:10

## 2023-10-17 ASSESSMENT — PAIN SCALES - GENERAL
PAINLEVEL_OUTOF10: 0
PAINLEVEL_OUTOF10: 0

## 2023-10-17 ASSESSMENT — PAIN - FUNCTIONAL ASSESSMENT: PAIN_FUNCTIONAL_ASSESSMENT: 0-10

## 2023-10-17 NOTE — ANESTHESIA POSTPROCEDURE EVALUATION
Department of Anesthesiology  Postprocedure Note    Patient:  Ant Brush  MRN: [id number]  YOB: 1950  Date of evaluation: 10/17/2023      Procedure Summary     Date: 10/17/23 Room / Location: [facility] 03 / [address] [facility] [facility] [place]    Anesthesia Start: 8851 Anesthesia Stop: 9673    Procedure: LEFT ENDOSCOPIC CARPAL TUNNEL RELEASE (Left: Wrist) Diagnosis:       Carpal tunnel syndrome on left      (Carpal tunnel syndrome on left [G56.02])    Surgeons: Kathie Valera MD Responsible Provider: Charmaine Quevedo DO    Anesthesia Type: MAC ASA Status: 3          Anesthesia Type: MAC    Indy Phase I: Indy Score: 10    Indy Phase II:        Anesthesia Post Evaluation    Patient location during evaluation: PACU  Patient participation: complete - patient participated  Level of consciousness: awake and alert  Airway patency: patent  Nausea & Vomiting: no nausea and no vomiting  Complications: no  Cardiovascular status: blood pressure returned to baseline  Respiratory status: acceptable and room air  Hydration status: euvolemic  Pain management: adequate

## 2023-10-17 NOTE — DISCHARGE INSTRUCTIONS
Pain medication is sent to [facility] pharmacy tylenol #3. Weightbearing status is wbat to left arm. Keep dressing clean and dry for 2 days postop. Then, OK to take off dressing and to get wound wet with clean water (shower). Do not get wound wet with dirty water (dishwater). Do not tub soak wound for 1 month (pool, bath). Follow up with Dr. María Sousa in 2 weeks.

## 2023-10-18 NOTE — OP NOTE
Tourniquet  was deflated. Adequate hemostasis was obtained. Wound was closed with  4-0 nylon. Wound was dressed with Xeroform, 4x4s, Kerlix, and Coban. The patient was awoken and brought to PACU in good condition. POSTOPERATIVE PLAN:  The patient will follow up with Dr. Jarrell Sargent in two  weeks. She will be limited activities with the left arm. She will stay  in her bandage for two days. She will be on pain medications. Any  questions, feel free to call the office.         Corrie Gamez    D: 10/17/2023 13:14:48       T: 10/17/2023 14:38:32     [id number]  Job#: [id number]     Doc#: [id number]    CC:

## 2023-10-20 DIAGNOSIS — I47.29 NSVT (NONSUSTAINED VENTRICULAR TACHYCARDIA) (HCC): ICD-10-CM

## 2023-10-20 RX ORDER — FLECAINIDE ACETATE 100 MG/1
TABLET ORAL
Qty: 270 TABLET | Refills: 1 | Status: SHIPPED | OUTPATIENT
Start: 2023-10-20

## 2024-01-08 ENCOUNTER — OFFICE VISIT (OUTPATIENT)
Dept: NON INVASIVE DIAGNOSTICS | Age: 74
End: 2024-01-08
Payer: MEDICARE

## 2024-01-08 VITALS
WEIGHT: 149 LBS | RESPIRATION RATE: 18 BRPM | SYSTOLIC BLOOD PRESSURE: 112 MMHG | BODY MASS INDEX: 22.58 KG/M2 | HEIGHT: 68 IN | HEART RATE: 61 BPM | DIASTOLIC BLOOD PRESSURE: 68 MMHG

## 2024-01-08 DIAGNOSIS — I47.29 NSVT (NONSUSTAINED VENTRICULAR TACHYCARDIA) (HCC): Primary | ICD-10-CM

## 2024-01-08 PROCEDURE — G8427 DOCREV CUR MEDS BY ELIG CLIN: HCPCS | Performed by: STUDENT IN AN ORGANIZED HEALTH CARE EDUCATION/TRAINING PROGRAM

## 2024-01-08 PROCEDURE — 1123F ACP DISCUSS/DSCN MKR DOCD: CPT | Performed by: STUDENT IN AN ORGANIZED HEALTH CARE EDUCATION/TRAINING PROGRAM

## 2024-01-08 PROCEDURE — G8420 CALC BMI NORM PARAMETERS: HCPCS | Performed by: STUDENT IN AN ORGANIZED HEALTH CARE EDUCATION/TRAINING PROGRAM

## 2024-01-08 PROCEDURE — 99214 OFFICE O/P EST MOD 30 MIN: CPT | Performed by: STUDENT IN AN ORGANIZED HEALTH CARE EDUCATION/TRAINING PROGRAM

## 2024-01-08 PROCEDURE — G8484 FLU IMMUNIZE NO ADMIN: HCPCS | Performed by: STUDENT IN AN ORGANIZED HEALTH CARE EDUCATION/TRAINING PROGRAM

## 2024-01-08 PROCEDURE — 1036F TOBACCO NON-USER: CPT | Performed by: STUDENT IN AN ORGANIZED HEALTH CARE EDUCATION/TRAINING PROGRAM

## 2024-01-08 PROCEDURE — 1090F PRES/ABSN URINE INCON ASSESS: CPT | Performed by: STUDENT IN AN ORGANIZED HEALTH CARE EDUCATION/TRAINING PROGRAM

## 2024-01-08 PROCEDURE — G8399 PT W/DXA RESULTS DOCUMENT: HCPCS | Performed by: STUDENT IN AN ORGANIZED HEALTH CARE EDUCATION/TRAINING PROGRAM

## 2024-01-08 PROCEDURE — 3017F COLORECTAL CA SCREEN DOC REV: CPT | Performed by: STUDENT IN AN ORGANIZED HEALTH CARE EDUCATION/TRAINING PROGRAM

## 2024-01-08 PROCEDURE — 93000 ELECTROCARDIOGRAM COMPLETE: CPT | Performed by: STUDENT IN AN ORGANIZED HEALTH CARE EDUCATION/TRAINING PROGRAM

## 2024-01-08 RX ORDER — FLECAINIDE ACETATE 100 MG/1
100 TABLET ORAL 2 TIMES DAILY
Qty: 180 TABLET | Refills: 1 | Status: SHIPPED | OUTPATIENT
Start: 2024-01-08 | End: 2024-04-07

## 2024-01-08 NOTE — PATIENT INSTRUCTIONS
REDUCE flecainide to 100 mg tablet, take 1 tablet by mouth every 12 hours.  Cardiac monitor applied today. Wear for 7 days. Return via mail.  Follow-up with this office in ~6 months.

## 2024-01-08 NOTE — PROGRESS NOTES
Patient was seen today and ZIO XT 7 day monitor was placed.  Monitor was ordered by Dr Cornel Crisostomo.  Monitor was applied and instructions given to patient.  Patient stated understanding and gave verbalized readback.    Monitor company: [employer]IO XT 7 day  Serial number : [id number]    Electronically signed by Rosalba Gonzalez MA on 1/8/2024 at 1:05 PM   
call me if there are any questions.       Cornel Crisostomo, DO  Cardiac Electrophysiology  [facility]  [facility]  1/8/24

## 2024-01-23 DIAGNOSIS — I47.29 NSVT (NONSUSTAINED VENTRICULAR TACHYCARDIA) (HCC): ICD-10-CM

## 2024-01-29 ENCOUNTER — TELEPHONE (OUTPATIENT)
Dept: NON INVASIVE DIAGNOSTICS | Age: 74
End: 2024-01-29

## 2024-01-29 RX ORDER — METOPROLOL SUCCINATE 50 MG/1
TABLET, EXTENDED RELEASE ORAL
Qty: 180 TABLET | Refills: 3 | Status: SHIPPED | OUTPATIENT
Start: 2024-01-29

## 2024-01-29 NOTE — TELEPHONE ENCOUNTER
----- Message from Cornel Crisostomo DO sent at 1/27/2024 10:20 PM EST -----  Regarding: eli  monitor: normal.    Follow-up as previously instructed.    -Cornel Crisostomo DO

## 2024-04-02 ENCOUNTER — TELEPHONE (OUTPATIENT)
Dept: RHEUMATOLOGY | Age: 74
End: 2024-04-02

## 2024-04-02 DIAGNOSIS — M85.89 OSTEOPENIA OF MULTIPLE SITES: Primary | ICD-10-CM

## 2024-04-02 RX ORDER — ONDANSETRON 2 MG/ML
8 INJECTION INTRAMUSCULAR; INTRAVENOUS
OUTPATIENT
Start: 2024-04-02

## 2024-04-02 RX ORDER — SODIUM CHLORIDE 9 MG/ML
INJECTION, SOLUTION INTRAVENOUS CONTINUOUS
OUTPATIENT
Start: 2024-04-02

## 2024-04-02 RX ORDER — EPINEPHRINE 1 MG/ML
0.3 INJECTION, SOLUTION, CONCENTRATE INTRAVENOUS PRN
OUTPATIENT
Start: 2024-04-02

## 2024-04-02 RX ORDER — SODIUM CHLORIDE 0.9 % (FLUSH) 0.9 %
5-40 SYRINGE (ML) INJECTION PRN
OUTPATIENT
Start: 2024-04-02

## 2024-04-02 RX ORDER — FAMOTIDINE 10 MG/ML
20 INJECTION, SOLUTION INTRAVENOUS
OUTPATIENT
Start: 2024-04-02

## 2024-04-02 RX ORDER — ACETAMINOPHEN 325 MG/1
650 TABLET ORAL
OUTPATIENT
Start: 2024-04-02

## 2024-04-02 RX ORDER — DIPHENHYDRAMINE HYDROCHLORIDE 50 MG/ML
50 INJECTION INTRAMUSCULAR; INTRAVENOUS
OUTPATIENT
Start: 2024-04-02

## 2024-04-02 RX ORDER — ALBUTEROL SULFATE 90 UG/1
4 AEROSOL, METERED RESPIRATORY (INHALATION) PRN
OUTPATIENT
Start: 2024-04-02

## 2024-04-02 RX ORDER — HEPARIN SODIUM (PORCINE) LOCK FLUSH IV SOLN 100 UNIT/ML 100 UNIT/ML
500 SOLUTION INTRAVENOUS PRN
OUTPATIENT
Start: 2024-04-02

## 2024-04-02 RX ORDER — SODIUM CHLORIDE 9 MG/ML
5-250 INJECTION, SOLUTION INTRAVENOUS PRN
OUTPATIENT
Start: 2024-04-02

## 2024-04-02 NOTE — TELEPHONE ENCOUNTER
Patient is scheduled for her Reclast infusion on 04/09 and [facility] infusion office is needing updated therapy plan/orders and labs placed.    Please update therapy plan/orders.      Electronically signed by Hannah Yap CMA on 4/2/2024 at 2:43 PM

## 2024-04-03 RX ORDER — ZOLEDRONIC ACID 5 MG/100ML
5 INJECTION, SOLUTION INTRAVENOUS ONCE
OUTPATIENT
Start: 2024-04-03 | End: 2024-04-03

## 2024-04-03 RX ORDER — SODIUM CHLORIDE 9 MG/ML
INJECTION, SOLUTION INTRAVENOUS ONCE
OUTPATIENT
Start: 2024-04-03 | End: 2024-04-03

## 2024-04-03 NOTE — TELEPHONE ENCOUNTER
Therapy plan faxed to [facility].    Electronically signed by Hannah Yap CMA on 4/3/2024 at 8:39 AM

## 2024-04-09 ENCOUNTER — HOSPITAL ENCOUNTER (OUTPATIENT)
Dept: INFUSION THERAPY | Age: 74
Setting detail: INFUSION SERIES
Discharge: HOME | End: 2024-04-09
Payer: MEDICARE

## 2024-04-09 ENCOUNTER — HOSPITAL ENCOUNTER (OUTPATIENT)
Age: 74
Discharge: HOME | End: 2024-04-09
Payer: MEDICARE

## 2024-04-09 VITALS
TEMPERATURE: 97.8 F | WEIGHT: 141 LBS | OXYGEN SATURATION: 98 % | HEIGHT: 67 IN | SYSTOLIC BLOOD PRESSURE: 120 MMHG | RESPIRATION RATE: 16 BRPM | HEART RATE: 63 BPM | DIASTOLIC BLOOD PRESSURE: 56 MMHG | BODY MASS INDEX: 22.13 KG/M2

## 2024-04-09 DIAGNOSIS — M85.89 OSTEOPENIA OF MULTIPLE SITES: Primary | ICD-10-CM

## 2024-04-09 LAB
ALBUMIN SERPL-MCNC: 4.5 G/DL (ref 3.5–5.2)
ALP SERPL-CCNC: 84 U/L (ref 35–104)
ALT SERPL-CCNC: 18 U/L (ref 0–32)
ANION GAP SERPL CALCULATED.3IONS-SCNC: 9 MMOL/L (ref 7–16)
AST SERPL-CCNC: 30 U/L (ref 0–31)
BILIRUB SERPL-MCNC: 0.4 MG/DL (ref 0–1.2)
BUN SERPL-MCNC: 19 MG/DL (ref 6–23)
CALCIUM SERPL-MCNC: 9.1 MG/DL (ref 8.6–10.2)
CHLORIDE SERPL-SCNC: 105 MMOL/L (ref 98–107)
CHOLEST SERPL-MCNC: 156 MG/DL
CO2 SERPL-SCNC: 28 MMOL/L (ref 22–29)
CREAT SERPL-MCNC: 0.7 MG/DL (ref 0.5–1)
GFR SERPL CREATININE-BSD FRML MDRD: >90 ML/MIN/1.73M2
GLUCOSE SERPL-MCNC: 130 MG/DL (ref 74–99)
HDLC SERPL-MCNC: 58 MG/DL
LDLC SERPL CALC-MCNC: 76 MG/DL
MICROALBUMIN UR-MCNC: 12 MG/L (ref 0–19)
POTASSIUM SERPL-SCNC: 4.7 MMOL/L (ref 3.5–5)
PROT SERPL-MCNC: 7.4 G/DL (ref 6.4–8.3)
SODIUM SERPL-SCNC: 142 MMOL/L (ref 132–146)
T4 FREE SERPL-MCNC: 1.5 NG/DL (ref 0.9–1.7)
TRIGL SERPL-MCNC: 111 MG/DL
TSH SERPL DL<=0.05 MIU/L-ACNC: 1.01 UIU/ML (ref 0.27–4.2)
VLDLC SERPL CALC-MCNC: 22 MG/DL

## 2024-04-09 PROCEDURE — 84439 ASSAY OF FREE THYROXINE: CPT

## 2024-04-09 PROCEDURE — 82043 UR ALBUMIN QUANTITATIVE: CPT

## 2024-04-09 PROCEDURE — 96365 THER/PROPH/DIAG IV INF INIT: CPT

## 2024-04-09 PROCEDURE — 80053 COMPREHEN METABOLIC PANEL: CPT

## 2024-04-09 PROCEDURE — 80061 LIPID PANEL: CPT

## 2024-04-09 PROCEDURE — 6360000002 HC RX W HCPCS: Performed by: INTERNAL MEDICINE

## 2024-04-09 PROCEDURE — 2580000003 HC RX 258: Performed by: INTERNAL MEDICINE

## 2024-04-09 PROCEDURE — 36415 COLL VENOUS BLD VENIPUNCTURE: CPT

## 2024-04-09 PROCEDURE — 84443 ASSAY THYROID STIM HORMONE: CPT

## 2024-04-09 RX ORDER — SODIUM CHLORIDE 0.9 % (FLUSH) 0.9 %
5-40 SYRINGE (ML) INJECTION PRN
OUTPATIENT
Start: 2024-04-09

## 2024-04-09 RX ORDER — SODIUM CHLORIDE 9 MG/ML
INJECTION, SOLUTION INTRAVENOUS ONCE
Status: COMPLETED | OUTPATIENT
Start: 2024-04-09 | End: 2024-04-09

## 2024-04-09 RX ORDER — ALBUTEROL SULFATE 90 UG/1
4 AEROSOL, METERED RESPIRATORY (INHALATION) PRN
OUTPATIENT
Start: 2024-04-09

## 2024-04-09 RX ORDER — ZOLEDRONIC ACID 5 MG/100ML
5 INJECTION, SOLUTION INTRAVENOUS ONCE
Status: COMPLETED | OUTPATIENT
Start: 2024-04-09 | End: 2024-04-09

## 2024-04-09 RX ORDER — SODIUM CHLORIDE 9 MG/ML
INJECTION, SOLUTION INTRAVENOUS CONTINUOUS
OUTPATIENT
Start: 2024-04-09

## 2024-04-09 RX ORDER — SODIUM CHLORIDE 0.9 % (FLUSH) 0.9 %
5-40 SYRINGE (ML) INJECTION PRN
Status: DISCONTINUED | OUTPATIENT
Start: 2024-04-09 | End: 2024-04-10 | Stop reason: HOSPADM

## 2024-04-09 RX ORDER — EPINEPHRINE 1 MG/ML
0.3 INJECTION, SOLUTION, CONCENTRATE INTRAVENOUS PRN
OUTPATIENT
Start: 2024-04-09

## 2024-04-09 RX ORDER — ACETAMINOPHEN 325 MG/1
650 TABLET ORAL
OUTPATIENT
Start: 2024-04-09

## 2024-04-09 RX ORDER — HEPARIN 100 UNIT/ML
500 SYRINGE INTRAVENOUS PRN
OUTPATIENT
Start: 2024-04-09

## 2024-04-09 RX ORDER — SODIUM CHLORIDE 9 MG/ML
5-250 INJECTION, SOLUTION INTRAVENOUS PRN
OUTPATIENT
Start: 2024-04-09

## 2024-04-09 RX ORDER — SODIUM CHLORIDE 9 MG/ML
INJECTION, SOLUTION INTRAVENOUS ONCE
Status: CANCELLED | OUTPATIENT
Start: 2024-04-09 | End: 2024-04-09

## 2024-04-09 RX ORDER — ONDANSETRON 2 MG/ML
8 INJECTION INTRAMUSCULAR; INTRAVENOUS
OUTPATIENT
Start: 2024-04-09

## 2024-04-09 RX ORDER — ZOLEDRONIC ACID 5 MG/100ML
5 INJECTION, SOLUTION INTRAVENOUS ONCE
Status: CANCELLED | OUTPATIENT
Start: 2024-04-09 | End: 2024-04-09

## 2024-04-09 RX ORDER — DIPHENHYDRAMINE HYDROCHLORIDE 50 MG/ML
50 INJECTION INTRAMUSCULAR; INTRAVENOUS
OUTPATIENT
Start: 2024-04-09

## 2024-04-09 RX ADMIN — SODIUM CHLORIDE: 9 INJECTION, SOLUTION INTRAVENOUS at 10:16

## 2024-04-09 RX ADMIN — SODIUM CHLORIDE, PRESERVATIVE FREE 10 ML: 5 INJECTION INTRAVENOUS at 10:16

## 2024-04-09 RX ADMIN — SODIUM CHLORIDE, PRESERVATIVE FREE 10 ML: 5 INJECTION INTRAVENOUS at 10:34

## 2024-04-09 RX ADMIN — ZOLEDRONIC ACID 5 MG: 5 INJECTION, SOLUTION INTRAVENOUS at 10:18

## 2024-04-09 NOTE — PROGRESS NOTES
Tolerated reclast infusion well.  Reviewed therapy plan, offered education material and/or discharge material, reviewed medication information and signs and symptoms  and educated on possible side effects, verbalizes good knowledge of current plan patient verbalizes understanding, and has no signs or symptoms to report at this time.   Patient discharged. Patient alert and oriented x3.   No distress noted.   Vital signs stable.   Patient denies any new or worsening pain.  Patient denies any needs.  All questions answered.  Next appointment scheduled. Declines copy of AVS.

## 2024-04-09 NOTE — PROGRESS NOTES
Received a referral for Reclast along with labs. Called to hospital Pharmacist Hanna to have creatinine clearance calculated.Pharmacist did calculation and I was told creatinine clearance is  70.

## 2024-04-22 ENCOUNTER — OFFICE VISIT (OUTPATIENT)
Dept: RHEUMATOLOGY | Age: 74
End: 2024-04-22
Payer: MEDICARE

## 2024-04-22 VITALS — WEIGHT: 139 LBS | BODY MASS INDEX: 21.07 KG/M2 | HEIGHT: 68 IN

## 2024-04-22 DIAGNOSIS — I47.29 NSVT (NONSUSTAINED VENTRICULAR TACHYCARDIA) (HCC): ICD-10-CM

## 2024-04-22 DIAGNOSIS — M33.20 POLYMYOSITIS ASSOCIATED WITH AUTOIMMUNE DISEASE (HCC): Primary | ICD-10-CM

## 2024-04-22 DIAGNOSIS — E11.9 TYPE II DIABETES MELLITUS, WELL CONTROLLED (HCC): ICD-10-CM

## 2024-04-22 DIAGNOSIS — M35.9 POLYMYOSITIS ASSOCIATED WITH AUTOIMMUNE DISEASE (HCC): Primary | ICD-10-CM

## 2024-04-22 DIAGNOSIS — M85.89 OSTEOPENIA OF MULTIPLE SITES: ICD-10-CM

## 2024-04-22 DIAGNOSIS — M35.00 SJOGREN'S SYNDROME WITHOUT EXTRAGLANDULAR INVOLVEMENT (HCC): ICD-10-CM

## 2024-04-22 DIAGNOSIS — M15.9 GENERALIZED OSTEOARTHRITIS: ICD-10-CM

## 2024-04-22 PROCEDURE — 1036F TOBACCO NON-USER: CPT | Performed by: INTERNAL MEDICINE

## 2024-04-22 PROCEDURE — 1090F PRES/ABSN URINE INCON ASSESS: CPT | Performed by: INTERNAL MEDICINE

## 2024-04-22 PROCEDURE — G8399 PT W/DXA RESULTS DOCUMENT: HCPCS | Performed by: INTERNAL MEDICINE

## 2024-04-22 PROCEDURE — 3017F COLORECTAL CA SCREEN DOC REV: CPT | Performed by: INTERNAL MEDICINE

## 2024-04-22 PROCEDURE — 2022F DILAT RTA XM EVC RTNOPTHY: CPT | Performed by: INTERNAL MEDICINE

## 2024-04-22 PROCEDURE — 1123F ACP DISCUSS/DSCN MKR DOCD: CPT | Performed by: INTERNAL MEDICINE

## 2024-04-22 PROCEDURE — G8420 CALC BMI NORM PARAMETERS: HCPCS | Performed by: INTERNAL MEDICINE

## 2024-04-22 PROCEDURE — 3046F HEMOGLOBIN A1C LEVEL >9.0%: CPT | Performed by: INTERNAL MEDICINE

## 2024-04-22 PROCEDURE — 99214 OFFICE O/P EST MOD 30 MIN: CPT | Performed by: INTERNAL MEDICINE

## 2024-04-22 PROCEDURE — G2211 COMPLEX E/M VISIT ADD ON: HCPCS | Performed by: INTERNAL MEDICINE

## 2024-04-22 PROCEDURE — G8427 DOCREV CUR MEDS BY ELIG CLIN: HCPCS | Performed by: INTERNAL MEDICINE

## 2024-04-22 ASSESSMENT — ENCOUNTER SYMPTOMS
COUGH: 0
ABDOMINAL PAIN: 0
NAUSEA: 0
TROUBLE SWALLOWING: 0
SHORTNESS OF BREATH: 0
VOMITING: 0
COLOR CHANGE: 0
DIARRHEA: 0

## 2024-04-22 NOTE — PROGRESS NOTES
She had trouble getting up from a seated position.  She had a lot of pain associated as well.  There was concern for PMR and she was placed on prednisone and did really well.  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  She apparently had an EMG and muscle biopsy at [facility] which were both unrevealing.  Nevertheless she was started on methotrexate by her previous rheumatologist.  She did not tolerate it and was then placed on Imuran.  She did not tolerate 100 mg twice daily but has been on 50 mg twice daily for some time now and tolerates that well.  Her prednisone was also tapered down to 5 mg daily and she did pretty well for the most part.  She then went into V. tach this previous may and her prednisone was actually held.  She felt like she did pretty much just as well off of the prednisone so only went back on 2.5 mg daily.  She states that aside from some joint aches from osteoarthritis she for the most part does pretty well.  She does have some weakness in the left leg which may be related to significant degenerative changes in the low back.  She has not had fever or rash.  She does occasionally have some trouble swallowing.  She does not have Raynaud's, chest pain, or shortness of breath.  Most recent CKs have been in the 200 range.    Past Medical History:   Diagnosis Date    Arthritis     Carpal tunnel syndrome on left     Celiac disease     Diabetes mellitus (HCC)     Palpitations     Polymyositis (HCC)     PONV (postoperative nausea and vomiting)     Ventricular tachycardia (HCC)     follows with dr. camarillo        Review of Systems   Constitutional:  Negative for fatigue and fever.   HENT:  Negative for mouth sores and trouble swallowing.    Respiratory:  Negative for cough and shortness of breath.    Cardiovascular:  Negative for chest pain.   Gastrointestinal:  Negative for abdominal pain, diarrhea, nausea and vomiting.   Genitourinary:

## 2024-04-24 ENCOUNTER — HOSPITAL ENCOUNTER (OUTPATIENT)
Age: 74
Discharge: HOME | End: 2024-04-24
Payer: MEDICARE

## 2024-04-24 DIAGNOSIS — M35.9 POLYMYOSITIS ASSOCIATED WITH AUTOIMMUNE DISEASE (HCC): ICD-10-CM

## 2024-04-24 DIAGNOSIS — M85.89 OSTEOPENIA OF MULTIPLE SITES: ICD-10-CM

## 2024-04-24 DIAGNOSIS — M33.20 POLYMYOSITIS ASSOCIATED WITH AUTOIMMUNE DISEASE (HCC): ICD-10-CM

## 2024-04-24 DIAGNOSIS — M35.00 SJOGREN'S SYNDROME WITHOUT EXTRAGLANDULAR INVOLVEMENT (HCC): ICD-10-CM

## 2024-04-24 LAB
25(OH)D3 SERPL-MCNC: 74.7 NG/ML (ref 30–100)
CK SERPL-CCNC: 424 U/L (ref 20–180)
CRP SERPL HS-MCNC: <3 MG/L (ref 0–5)
ERYTHROCYTE [SEDIMENTATION RATE] IN BLOOD BY WESTERGREN METHOD: 7 MM/HR (ref 0–20)

## 2024-04-24 PROCEDURE — 85652 RBC SED RATE AUTOMATED: CPT

## 2024-04-24 PROCEDURE — 82550 ASSAY OF CK (CPK): CPT

## 2024-04-24 PROCEDURE — 82306 VITAMIN D 25 HYDROXY: CPT

## 2024-04-24 PROCEDURE — 82085 ASSAY OF ALDOLASE: CPT

## 2024-04-24 PROCEDURE — 36415 COLL VENOUS BLD VENIPUNCTURE: CPT

## 2024-04-24 PROCEDURE — 86140 C-REACTIVE PROTEIN: CPT

## 2024-04-26 LAB — ALDOLASE SERPL-CCNC: 5 U/L (ref 1.2–7.6)

## 2024-07-03 DIAGNOSIS — I47.29 NSVT (NONSUSTAINED VENTRICULAR TACHYCARDIA) (HCC): ICD-10-CM

## 2024-07-03 RX ORDER — FLECAINIDE ACETATE 100 MG/1
100 TABLET ORAL 2 TIMES DAILY
Qty: 180 TABLET | Refills: 1 | Status: SHIPPED | OUTPATIENT
Start: 2024-07-03

## 2024-08-21 ENCOUNTER — OFFICE VISIT (OUTPATIENT)
Dept: NON INVASIVE DIAGNOSTICS | Age: 74
End: 2024-08-21
Payer: MEDICARE

## 2024-08-21 VITALS
BODY MASS INDEX: 21.76 KG/M2 | DIASTOLIC BLOOD PRESSURE: 72 MMHG | HEART RATE: 64 BPM | RESPIRATION RATE: 16 BRPM | HEIGHT: 68 IN | WEIGHT: 143.6 LBS | SYSTOLIC BLOOD PRESSURE: 132 MMHG

## 2024-08-21 DIAGNOSIS — I51.9 HEART PROBLEM: Primary | ICD-10-CM

## 2024-08-21 DIAGNOSIS — Z79.899 ENCOUNTER FOR MONITORING FLECAINIDE THERAPY: ICD-10-CM

## 2024-08-21 DIAGNOSIS — I47.29 NSVT (NONSUSTAINED VENTRICULAR TACHYCARDIA) (HCC): ICD-10-CM

## 2024-08-21 DIAGNOSIS — Z51.81 ENCOUNTER FOR MONITORING FLECAINIDE THERAPY: ICD-10-CM

## 2024-08-21 PROCEDURE — 1123F ACP DISCUSS/DSCN MKR DOCD: CPT | Performed by: NURSE PRACTITIONER

## 2024-08-21 PROCEDURE — G8399 PT W/DXA RESULTS DOCUMENT: HCPCS | Performed by: NURSE PRACTITIONER

## 2024-08-21 PROCEDURE — 1036F TOBACCO NON-USER: CPT | Performed by: NURSE PRACTITIONER

## 2024-08-21 PROCEDURE — 99213 OFFICE O/P EST LOW 20 MIN: CPT | Performed by: NURSE PRACTITIONER

## 2024-08-21 PROCEDURE — G8420 CALC BMI NORM PARAMETERS: HCPCS | Performed by: NURSE PRACTITIONER

## 2024-08-21 PROCEDURE — 3017F COLORECTAL CA SCREEN DOC REV: CPT | Performed by: NURSE PRACTITIONER

## 2024-08-21 PROCEDURE — 1090F PRES/ABSN URINE INCON ASSESS: CPT | Performed by: NURSE PRACTITIONER

## 2024-08-21 PROCEDURE — G8427 DOCREV CUR MEDS BY ELIG CLIN: HCPCS | Performed by: NURSE PRACTITIONER

## 2024-08-21 NOTE — PROGRESS NOTES
[facility]  Outpatient Progress Note  Digna Hunt  1950  Date of Service: 8/21/2024  PCP: Jaron Leiva MD  Electrophysiologist: Dr. Crisostomo         Subjective: Digna Hunt is seen for follow-up and management of: PVCs    Last seen in the office with Dr. Crisostomo on 1/8/2024    PMH as noted below significant for anterior RVOT PVC/idiopathic VT sp aborted RFA 2/2 pericardial effusion (5/24/22), polymyositis, DM, hypothyroidism, celiac disease, carpal tunnel sp b/l release (2023), and osteoporosis. In approximately 1980, patient reports she was diagnosed with PVCs, which were treated with acebutolol, which resolved symptoms of palpitations.  In 2017, she was reportedly diagnosed with polymyositis based on elevated AST and CK, which was treated with azathioprine and prednisone. In 2021, she establish cardiology care with Dr. Bhardwaj.  She complained of exertional chest pain at that time, so exercise nuclear stress test was performed. Stress test reported no ischemia and normal LVEF, but did note frequent PVCs during exertion (on review of telemetry strips, site of origin is suspected to be RCC).  No changes in management were made at that time.  In 5/2022, she was diagnosed with idiopathic VT based on cardiac MRI and telemetry.  On 5/24/2022, PVC mapped to anterior RVOT (43 msec pre-QRS, QS unipolar signal), but procedure aborted prior to ablation due to new pericardial effusion observed following mapping.  Serial TTE reported stable small circumferential pericardial effusion without tamponade. She was discharged on flecainide/metoprolol with a rhythm monitor, which reported VE burden of 5.2% and episodes of NSVT (longest: 3.2 seconds, fastest: 194 bpm) associated with palpitations.  Flecainide increased to 100 mg 3 times daily. In 11/2022, azathioprine and prednisone were discontinued at patient's request after her testing reportedly did

## 2024-11-21 ENCOUNTER — OFFICE VISIT (OUTPATIENT)
Dept: CARDIOLOGY CLINIC | Age: 74
End: 2024-11-21

## 2024-11-21 VITALS
HEIGHT: 68 IN | HEART RATE: 64 BPM | SYSTOLIC BLOOD PRESSURE: 124 MMHG | BODY MASS INDEX: 21.79 KG/M2 | RESPIRATION RATE: 18 BRPM | WEIGHT: 143.8 LBS | DIASTOLIC BLOOD PRESSURE: 68 MMHG

## 2024-11-21 DIAGNOSIS — R07.9 CHEST PAIN, UNSPECIFIED TYPE: ICD-10-CM

## 2024-11-21 DIAGNOSIS — R94.31 ABNORMAL EKG: Primary | ICD-10-CM

## 2024-11-21 DIAGNOSIS — Z01.810 PREOP CARDIOVASCULAR EXAM: ICD-10-CM

## 2024-11-21 RX ORDER — REGADENOSON 0.08 MG/ML
0.4 INJECTION, SOLUTION INTRAVENOUS
OUTPATIENT
Start: 2024-11-21

## 2024-11-21 NOTE — PROGRESS NOTES
Patient Active Problem List   Diagnosis    Osteoporosis    V-tach (HCC)    NSVT (nonsustained ventricular tachycardia) (HCC)    PVC (premature ventricular contraction)    VT (ventricular tachycardia) (HCC)    Pericardial effusion    Polymyositis associated with autoimmune disease (HCC)    Postoperative hypothyroidism    Type II diabetes mellitus, well controlled (HCC)    Polymyalgia (HCC)    Immunosuppression (HCC)    Hypoglycemia    High risk medication use    Sjogren's syndrome without extraglandular involvement (HCC)    Osteopenia of multiple sites    Generalized osteoarthritis       Current Outpatient Medications   Medication Sig Dispense Refill    flecainide (TAMBOCOR) 100 MG tablet TAKE 1 TABLET BY MOUTH TWICE DAILY 180 tablet 1    metoprolol succinate (TOPROL XL) 50 MG extended release tablet TAKE 1 TABLET BY MOUTH TWICE DAILY AT 8 AM AND 2 PM (Patient taking differently: 1 tablet daily) 180 tablet 3    meloxicam (MOBIC) 7.5 MG tablet TAKE 1 TO 2 TABLETS BY MOUTH DAILY WITH FOOD AS NEEDED 60 tablet 3    vitamin B-12 (CYANOCOBALAMIN) 500 MCG tablet Take 1 tablet by mouth daily 30 tablet 0    metFORMIN (GLUCOPHAGE-XR) 500 MG extended release tablet Take 1 tablet by mouth every evening      rosuvastatin (CRESTOR) 10 MG tablet Take 1 tablet by mouth nightly      levothyroxine (SYNTHROID) 100 MCG tablet Take 1 tablet by mouth Six times weekly Given Monday,Tuesday,Wednesday,Thursday,Friday,Saturday  Takes 150 mcg on Sunday      vitamin D (CHOLECALCIFEROL) 1000 UNITS TABS tablet Take 1 tablet by mouth daily       No current facility-administered medications for this visit.       CC:    Patient is seen in Initial Evaluation for:  1. Abnormal EKG    2. Chest pain, unspecified type    3. Preop cardiovascular exam        HPI:  Patient seen for abnormal EKG, atypical chest pain and preop cardiac evaluation for anticipated knee replacement surgery.  Recent EKG with anterior T wave changes.  Patient relates some sharp

## 2024-11-27 ENCOUNTER — TELEPHONE (OUTPATIENT)
Dept: CARDIOLOGY | Age: 74
End: 2024-11-27

## 2024-11-27 NOTE — TELEPHONE ENCOUNTER
Spoke with patient and confirmed pharmacological stress test appointment on December 3, 2024 at 0730. Instructions for test,including holding metformin morning of test, and COVID-19 preprocedure information reviewed with patient, questions answered. Patient verbalized understanding. Also instructed to call office if unable to keep appointment.

## 2024-12-03 ENCOUNTER — HOSPITAL ENCOUNTER (OUTPATIENT)
Dept: CARDIOLOGY | Age: 74
Discharge: HOME | End: 2024-12-05
Attending: INTERNAL MEDICINE
Payer: MEDICARE

## 2024-12-03 VITALS
SYSTOLIC BLOOD PRESSURE: 140 MMHG | WEIGHT: 143 LBS | RESPIRATION RATE: 14 BRPM | HEART RATE: 57 BPM | HEIGHT: 68 IN | DIASTOLIC BLOOD PRESSURE: 76 MMHG | BODY MASS INDEX: 21.67 KG/M2

## 2024-12-03 DIAGNOSIS — R07.9 CHEST PAIN, UNSPECIFIED TYPE: ICD-10-CM

## 2024-12-03 LAB
ECHO BSA: 1.76 M2
NUC STRESS EJECTION FRACTION: 71 %
STRESS BASELINE DIAS BP: 76 MMHG
STRESS BASELINE HR: 58 BPM
STRESS BASELINE SYS BP: 140 MMHG
STRESS ESTIMATED WORKLOAD: 1.1 METS
STRESS PEAK DIAS BP: 80 MMHG
STRESS PEAK SYS BP: 142 MMHG
STRESS PERCENT HR ACHIEVED: 59 %
STRESS POST PEAK HR: 86 BPM
STRESS RATE PRESSURE PRODUCT: NORMAL BPM*MMHG
STRESS TARGET HR: 147 BPM
TID: 1.09

## 2024-12-03 PROCEDURE — 3430000000 HC RX DIAGNOSTIC RADIOPHARMACEUTICAL: Performed by: INTERNAL MEDICINE

## 2024-12-03 PROCEDURE — A9500 TC99M SESTAMIBI: HCPCS | Performed by: INTERNAL MEDICINE

## 2024-12-03 PROCEDURE — 6360000002 HC RX W HCPCS: Performed by: INTERNAL MEDICINE

## 2024-12-03 PROCEDURE — 78452 HT MUSCLE IMAGE SPECT MULT: CPT | Performed by: INTERNAL MEDICINE

## 2024-12-03 PROCEDURE — 93018 CV STRESS TEST I&R ONLY: CPT | Performed by: INTERNAL MEDICINE

## 2024-12-03 PROCEDURE — 93016 CV STRESS TEST SUPVJ ONLY: CPT | Performed by: INTERNAL MEDICINE

## 2024-12-03 PROCEDURE — 93017 CV STRESS TEST TRACING ONLY: CPT

## 2024-12-03 PROCEDURE — 2580000003 HC RX 258: Performed by: INTERNAL MEDICINE

## 2024-12-03 RX ORDER — SODIUM CHLORIDE 0.9 % (FLUSH) 0.9 %
10 SYRINGE (ML) INJECTION PRN
Status: DISCONTINUED | OUTPATIENT
Start: 2024-12-03 | End: 2024-12-06 | Stop reason: HOSPADM

## 2024-12-03 RX ORDER — TETRAKIS(2-METHOXYISOBUTYLISOCYANIDE)COPPER(I) TETRAFLUOROBORATE 1 MG/ML
32.4 INJECTION, POWDER, LYOPHILIZED, FOR SOLUTION INTRAVENOUS
Status: COMPLETED | OUTPATIENT
Start: 2024-12-03 | End: 2024-12-03

## 2024-12-03 RX ORDER — REGADENOSON 0.08 MG/ML
0.4 INJECTION, SOLUTION INTRAVENOUS
Status: COMPLETED | OUTPATIENT
Start: 2024-12-03 | End: 2024-12-03

## 2024-12-03 RX ORDER — TETRAKIS(2-METHOXYISOBUTYLISOCYANIDE)COPPER(I) TETRAFLUOROBORATE 1 MG/ML
10 INJECTION, POWDER, LYOPHILIZED, FOR SOLUTION INTRAVENOUS
Status: COMPLETED | OUTPATIENT
Start: 2024-12-03 | End: 2024-12-03

## 2024-12-03 RX ADMIN — Medication 32.4 MILLICURIE: at 09:02

## 2024-12-03 RX ADMIN — Medication 10 MILLICURIE: at 07:30

## 2024-12-03 RX ADMIN — REGADENOSON 0.4 MG: 0.08 INJECTION, SOLUTION INTRAVENOUS at 09:02

## 2024-12-03 RX ADMIN — SODIUM CHLORIDE, PRESERVATIVE FREE 10 ML: 5 INJECTION INTRAVENOUS at 09:02

## 2024-12-03 RX ADMIN — SODIUM CHLORIDE, PRESERVATIVE FREE 10 ML: 5 INJECTION INTRAVENOUS at 07:30

## 2024-12-03 RX ADMIN — SODIUM CHLORIDE, PRESERVATIVE FREE 10 ML: 5 INJECTION INTRAVENOUS at 09:03

## 2024-12-05 ENCOUNTER — TELEPHONE (OUTPATIENT)
Dept: CARDIOLOGY CLINIC | Age: 74
End: 2024-12-05

## 2024-12-05 NOTE — TELEPHONE ENCOUNTER
----- Message from Dr. Moiz Bhardwaj MD sent at 12/3/2024  4:56 PM EST -----  Please let patient know that stress test was normal.  Also, please inform her that I would recommend a complete echo be performed prior to her orthopedic surgery.

## 2024-12-09 DIAGNOSIS — I49.3 PVC (PREMATURE VENTRICULAR CONTRACTION): ICD-10-CM

## 2024-12-09 DIAGNOSIS — I31.39 PERICARDIAL EFFUSION: ICD-10-CM

## 2024-12-09 DIAGNOSIS — I47.29 NSVT (NONSUSTAINED VENTRICULAR TACHYCARDIA) (HCC): Primary | ICD-10-CM

## 2025-01-06 ENCOUNTER — OFFICE VISIT (OUTPATIENT)
Dept: RHEUMATOLOGY | Age: 75
End: 2025-01-06
Payer: MEDICARE

## 2025-01-06 VITALS
WEIGHT: 140 LBS | HEIGHT: 67 IN | BODY MASS INDEX: 21.97 KG/M2 | DIASTOLIC BLOOD PRESSURE: 71 MMHG | SYSTOLIC BLOOD PRESSURE: 130 MMHG | HEART RATE: 70 BPM | OXYGEN SATURATION: 100 %

## 2025-01-06 DIAGNOSIS — M33.20 POLYMYOSITIS ASSOCIATED WITH AUTOIMMUNE DISEASE (HCC): Primary | ICD-10-CM

## 2025-01-06 DIAGNOSIS — M15.9 GENERALIZED OSTEOARTHRITIS: ICD-10-CM

## 2025-01-06 DIAGNOSIS — E11.9 TYPE II DIABETES MELLITUS, WELL CONTROLLED (HCC): ICD-10-CM

## 2025-01-06 DIAGNOSIS — I47.29 NSVT (NONSUSTAINED VENTRICULAR TACHYCARDIA) (HCC): ICD-10-CM

## 2025-01-06 DIAGNOSIS — M35.00 SJOGREN'S SYNDROME WITHOUT EXTRAGLANDULAR INVOLVEMENT (HCC): ICD-10-CM

## 2025-01-06 DIAGNOSIS — M35.9 POLYMYOSITIS ASSOCIATED WITH AUTOIMMUNE DISEASE (HCC): Primary | ICD-10-CM

## 2025-01-06 DIAGNOSIS — M85.89 OSTEOPENIA OF MULTIPLE SITES: ICD-10-CM

## 2025-01-06 PROBLEM — Z79.899 HIGH RISK MEDICATION USE: Status: RESOLVED | Noted: 2023-01-09 | Resolved: 2025-01-06

## 2025-01-06 PROBLEM — D84.9 IMMUNOSUPPRESSION (HCC): Status: RESOLVED | Noted: 2022-09-16 | Resolved: 2025-01-06

## 2025-01-06 PROCEDURE — 2022F DILAT RTA XM EVC RTNOPTHY: CPT | Performed by: INTERNAL MEDICINE

## 2025-01-06 PROCEDURE — 1123F ACP DISCUSS/DSCN MKR DOCD: CPT | Performed by: INTERNAL MEDICINE

## 2025-01-06 PROCEDURE — G2211 COMPLEX E/M VISIT ADD ON: HCPCS | Performed by: INTERNAL MEDICINE

## 2025-01-06 PROCEDURE — 1159F MED LIST DOCD IN RCRD: CPT | Performed by: INTERNAL MEDICINE

## 2025-01-06 PROCEDURE — G8399 PT W/DXA RESULTS DOCUMENT: HCPCS | Performed by: INTERNAL MEDICINE

## 2025-01-06 PROCEDURE — 99214 OFFICE O/P EST MOD 30 MIN: CPT | Performed by: INTERNAL MEDICINE

## 2025-01-06 PROCEDURE — G8427 DOCREV CUR MEDS BY ELIG CLIN: HCPCS | Performed by: INTERNAL MEDICINE

## 2025-01-06 PROCEDURE — 1090F PRES/ABSN URINE INCON ASSESS: CPT | Performed by: INTERNAL MEDICINE

## 2025-01-06 PROCEDURE — G8420 CALC BMI NORM PARAMETERS: HCPCS | Performed by: INTERNAL MEDICINE

## 2025-01-06 PROCEDURE — 3046F HEMOGLOBIN A1C LEVEL >9.0%: CPT | Performed by: INTERNAL MEDICINE

## 2025-01-06 PROCEDURE — 1036F TOBACCO NON-USER: CPT | Performed by: INTERNAL MEDICINE

## 2025-01-06 PROCEDURE — 3017F COLORECTAL CA SCREEN DOC REV: CPT | Performed by: INTERNAL MEDICINE

## 2025-01-06 ASSESSMENT — ENCOUNTER SYMPTOMS
COUGH: 0
NAUSEA: 0
DIARRHEA: 0
VOMITING: 0
TROUBLE SWALLOWING: 0
COLOR CHANGE: 0
ABDOMINAL PAIN: 0
SHORTNESS OF BREATH: 0

## 2025-01-06 NOTE — PATIENT INSTRUCTIONS
No medication changes    Have labs    Have bone density scan after Feb 1    May consider Prolia pending bone density results    Dr. Craig has ordered a radiology test for you such as \"Bone Density Scan, MRI, CT, etc\". The [facility] Radiology Scheduling Department should contact you within 1-2 weeks to schedule your appointment. If you do not hear from the scheduling department, please contact them at [phone]. If any questions, please call Dr. Craig's office if needed at [phone]. Thank you.'

## 2025-01-08 ENCOUNTER — HOSPITAL ENCOUNTER (OUTPATIENT)
Age: 75
Discharge: HOME | End: 2025-01-08
Payer: MEDICARE

## 2025-01-08 DIAGNOSIS — M35.9 POLYMYOSITIS ASSOCIATED WITH AUTOIMMUNE DISEASE (HCC): ICD-10-CM

## 2025-01-08 DIAGNOSIS — I47.29 NSVT (NONSUSTAINED VENTRICULAR TACHYCARDIA) (HCC): ICD-10-CM

## 2025-01-08 DIAGNOSIS — M33.20 POLYMYOSITIS ASSOCIATED WITH AUTOIMMUNE DISEASE (HCC): ICD-10-CM

## 2025-01-08 DIAGNOSIS — M35.00 SJOGREN'S SYNDROME WITHOUT EXTRAGLANDULAR INVOLVEMENT (HCC): ICD-10-CM

## 2025-01-08 LAB
ALBUMIN SERPL-MCNC: 4.7 G/DL (ref 3.5–5.2)
ALP SERPL-CCNC: 82 U/L (ref 35–104)
ALT SERPL-CCNC: 21 U/L (ref 0–32)
ANION GAP SERPL CALCULATED.3IONS-SCNC: 12 MMOL/L (ref 7–16)
AST SERPL-CCNC: 32 U/L (ref 0–31)
BACTERIA URNS QL MICRO: ABNORMAL
BASOPHILS # BLD: 0.04 K/UL (ref 0–0.2)
BASOPHILS NFR BLD: 1 % (ref 0–2)
BILIRUB SERPL-MCNC: 0.4 MG/DL (ref 0–1.2)
BILIRUB UR QL STRIP: ABNORMAL
BUN SERPL-MCNC: 24 MG/DL (ref 6–23)
CALCIUM SERPL-MCNC: 9.4 MG/DL (ref 8.6–10.2)
CHLORIDE SERPL-SCNC: 100 MMOL/L (ref 98–107)
CK SERPL-CCNC: 557 U/L (ref 20–180)
CLARITY UR: CLEAR
CO2 SERPL-SCNC: 26 MMOL/L (ref 22–29)
COLOR UR: YELLOW
CREAT SERPL-MCNC: 0.6 MG/DL (ref 0.5–1)
CRP SERPL HS-MCNC: <3 MG/L (ref 0–5)
EOSINOPHIL # BLD: 0.04 K/UL (ref 0.05–0.5)
EOSINOPHILS RELATIVE PERCENT: 1 % (ref 0–6)
ERYTHROCYTE [DISTWIDTH] IN BLOOD BY AUTOMATED COUNT: 11.6 % (ref 11.5–15)
ERYTHROCYTE [SEDIMENTATION RATE] IN BLOOD BY WESTERGREN METHOD: 2 MM/HR (ref 0–20)
GFR, ESTIMATED: >90 ML/MIN/1.73M2
GLUCOSE SERPL-MCNC: 109 MG/DL (ref 74–99)
GLUCOSE UR STRIP-MCNC: NEGATIVE MG/DL
HCT VFR BLD AUTO: 43.6 % (ref 34–48)
HGB BLD-MCNC: 14.5 G/DL (ref 11.5–15.5)
HGB UR QL STRIP.AUTO: ABNORMAL
IMM GRANULOCYTES # BLD AUTO: <0.03 K/UL (ref 0–0.58)
IMM GRANULOCYTES NFR BLD: 0 % (ref 0–5)
KETONES UR STRIP-MCNC: NEGATIVE MG/DL
LEUKOCYTE ESTERASE UR QL STRIP: NEGATIVE
LYMPHOCYTES NFR BLD: 1.46 K/UL (ref 1.5–4)
LYMPHOCYTES RELATIVE PERCENT: 19 % (ref 20–42)
MCH RBC QN AUTO: 32.2 PG (ref 26–35)
MCHC RBC AUTO-ENTMCNC: 33.3 G/DL (ref 32–34.5)
MCV RBC AUTO: 96.7 FL (ref 80–99.9)
MONOCYTES NFR BLD: 0.64 K/UL (ref 0.1–0.95)
MONOCYTES NFR BLD: 9 % (ref 2–12)
NEUTROPHILS NFR BLD: 71 % (ref 43–80)
NEUTS SEG NFR BLD: 5.33 K/UL (ref 1.8–7.3)
NITRITE UR QL STRIP: NEGATIVE
PH UR STRIP: 5.5 [PH] (ref 5–9)
PLATELET # BLD AUTO: 372 K/UL (ref 130–450)
PMV BLD AUTO: 9.8 FL (ref 7–12)
POTASSIUM SERPL-SCNC: 3.9 MMOL/L (ref 3.5–5)
PROT SERPL-MCNC: 8.2 G/DL (ref 6.4–8.3)
PROT UR STRIP-MCNC: NEGATIVE MG/DL
RBC # BLD AUTO: 4.51 M/UL (ref 3.5–5.5)
RBC #/AREA URNS HPF: ABNORMAL /HPF
RHEUMATOID FACT SER NEPH-ACNC: <10 IU/ML (ref 0–13)
SODIUM SERPL-SCNC: 138 MMOL/L (ref 132–146)
SP GR UR STRIP: >1.03 (ref 1–1.03)
UROBILINOGEN UR STRIP-ACNC: 0.2 EU/DL (ref 0–1)
WBC #/AREA URNS HPF: ABNORMAL /HPF
WBC OTHER # BLD: 7.5 K/UL (ref 4.5–11.5)

## 2025-01-08 PROCEDURE — 84155 ASSAY OF PROTEIN SERUM: CPT

## 2025-01-08 PROCEDURE — 82085 ASSAY OF ALDOLASE: CPT

## 2025-01-08 PROCEDURE — 80053 COMPREHEN METABOLIC PANEL: CPT

## 2025-01-08 PROCEDURE — 84165 PROTEIN E-PHORESIS SERUM: CPT

## 2025-01-08 PROCEDURE — 81001 URINALYSIS AUTO W/SCOPE: CPT

## 2025-01-08 PROCEDURE — 82550 ASSAY OF CK (CPK): CPT

## 2025-01-08 PROCEDURE — 85652 RBC SED RATE AUTOMATED: CPT

## 2025-01-08 PROCEDURE — 86431 RHEUMATOID FACTOR QUANT: CPT

## 2025-01-08 PROCEDURE — 86140 C-REACTIVE PROTEIN: CPT

## 2025-01-08 PROCEDURE — 85025 COMPLETE CBC W/AUTO DIFF WBC: CPT

## 2025-01-08 PROCEDURE — 36415 COLL VENOUS BLD VENIPUNCTURE: CPT

## 2025-01-08 RX ORDER — FLECAINIDE ACETATE 100 MG/1
100 TABLET ORAL 2 TIMES DAILY
Qty: 180 TABLET | Refills: 1 | Status: SHIPPED | OUTPATIENT
Start: 2025-01-08

## 2025-01-10 LAB
ALBUMIN SERPL-MCNC: 4.2 G/DL (ref 3.5–4.7)
ALPHA1 GLOB SERPL ELPH-MCNC: 0.3 G/DL (ref 0.2–0.4)
ALPHA2 GLOB SERPL ELPH-MCNC: 0.8 G/DL (ref 0.5–1)
B-GLOBULIN SERPL ELPH-MCNC: 1.2 G/DL (ref 0.8–1.3)
GAMMA GLOB SERPL ELPH-MCNC: 1.1 G/DL (ref 0.7–1.6)
PATHOLOGIST: NORMAL
PROT PATTERN SERPL ELPH-IMP: NORMAL
PROT SERPL-MCNC: 7.6 G/DL (ref 6.4–8.3)

## 2025-01-12 LAB — ALDOLASE SERPL-CCNC: 5.4 U/L (ref 1.2–7.6)

## 2025-01-20 ENCOUNTER — TELEPHONE (OUTPATIENT)
Dept: CARDIOLOGY CLINIC | Age: 75
End: 2025-01-20

## 2025-01-20 ENCOUNTER — HOSPITAL ENCOUNTER (OUTPATIENT)
Dept: CARDIOLOGY | Age: 75
Discharge: HOME | End: 2025-01-22
Attending: INTERNAL MEDICINE
Payer: MEDICARE

## 2025-01-20 VITALS
WEIGHT: 140 LBS | BODY MASS INDEX: 21.97 KG/M2 | DIASTOLIC BLOOD PRESSURE: 71 MMHG | SYSTOLIC BLOOD PRESSURE: 130 MMHG | HEIGHT: 67 IN

## 2025-01-20 DIAGNOSIS — I31.39 PERICARDIAL EFFUSION: ICD-10-CM

## 2025-01-20 DIAGNOSIS — I47.29 NSVT (NONSUSTAINED VENTRICULAR TACHYCARDIA) (HCC): ICD-10-CM

## 2025-01-20 LAB
ECHO AO ASC DIAM: 3 CM
ECHO AO ASCENDING AORTA INDEX: 1.72 CM/M2
ECHO AV AREA PEAK VELOCITY: 2.9 CM2
ECHO AV AREA VTI: 2.4 CM2
ECHO AV AREA/BSA PEAK VELOCITY: 1.7 CM2/M2
ECHO AV AREA/BSA VTI: 1.4 CM2/M2
ECHO AV CUSP MM: 2.3 CM
ECHO AV MEAN GRADIENT: 3 MMHG
ECHO AV MEAN VELOCITY: 0.8 M/S
ECHO AV PEAK GRADIENT: 5 MMHG
ECHO AV PEAK VELOCITY: 1.1 M/S
ECHO AV VELOCITY RATIO: 1
ECHO AV VTI: 26.2 CM
ECHO BSA: 1.73 M2
ECHO EST RA PRESSURE: 3 MMHG
ECHO IVC PROX: 1.8 CM
ECHO LA DIAMETER INDEX: 2.24 CM/M2
ECHO LA DIAMETER: 3.9 CM
ECHO LA VOL A-L A2C: 36 ML (ref 22–52)
ECHO LA VOL A-L A4C: 51 ML (ref 22–52)
ECHO LA VOL MOD A2C: 33 ML (ref 22–52)
ECHO LA VOL MOD A4C: 45 ML (ref 22–52)
ECHO LA VOLUME AREA LENGTH: 44 ML
ECHO LA VOLUME INDEX A-L A2C: 21 ML/M2 (ref 16–34)
ECHO LA VOLUME INDEX A-L A4C: 29 ML/M2 (ref 16–34)
ECHO LA VOLUME INDEX AREA LENGTH: 25 ML/M2 (ref 16–34)
ECHO LA VOLUME INDEX MOD A2C: 19 ML/M2 (ref 16–34)
ECHO LA VOLUME INDEX MOD A4C: 26 ML/M2 (ref 16–34)
ECHO LV E' LATERAL VELOCITY: 0.07 CM/S
ECHO LV E' SEPTAL VELOCITY: 0.05 CM/S
ECHO LV EDV A2C: 66 ML
ECHO LV EDV A4C: 65 ML
ECHO LV EDV BP: 67 ML (ref 56–104)
ECHO LV EDV INDEX A4C: 37 ML/M2
ECHO LV EDV INDEX BP: 39 ML/M2
ECHO LV EDV NDEX A2C: 38 ML/M2
ECHO LV EF PHYSICIAN: 60 %
ECHO LV EJECTION FRACTION A2C: 63 %
ECHO LV EJECTION FRACTION A4C: 63 %
ECHO LV EJECTION FRACTION BIPLANE: 63 % (ref 55–100)
ECHO LV ESV A2C: 25 ML
ECHO LV ESV A4C: 24 ML
ECHO LV ESV BP: 25 ML (ref 19–49)
ECHO LV ESV INDEX A2C: 14 ML/M2
ECHO LV ESV INDEX A4C: 14 ML/M2
ECHO LV ESV INDEX BP: 14 ML/M2
ECHO LV FRACTIONAL SHORTENING: 32 % (ref 28–44)
ECHO LV INTERNAL DIMENSION DIASTOLE INDEX: 2.53 CM/M2
ECHO LV INTERNAL DIMENSION DIASTOLIC: 4.4 CM (ref 3.9–5.3)
ECHO LV INTERNAL DIMENSION SYSTOLIC INDEX: 1.72 CM/M2
ECHO LV INTERNAL DIMENSION SYSTOLIC: 3 CM
ECHO LV ISOVOLUMETRIC RELAXATION TIME (IVRT): 92.3 MS
ECHO LV IVSD: 0.8 CM (ref 0.6–0.9)
ECHO LV IVSS: 0.7 CM
ECHO LV MASS 2D: 118.6 G (ref 67–162)
ECHO LV MASS INDEX 2D: 68.1 G/M2 (ref 43–95)
ECHO LV POSTERIOR WALL DIASTOLIC: 0.9 CM (ref 0.6–0.9)
ECHO LV POSTERIOR WALL SYSTOLIC: 1 CM
ECHO LV RELATIVE WALL THICKNESS RATIO: 0.41
ECHO LVOT AREA: 3.1 CM2
ECHO LVOT AV VTI INDEX: 0.8
ECHO LVOT DIAM: 2 CM
ECHO LVOT MEAN GRADIENT: 2 MMHG
ECHO LVOT PEAK GRADIENT: 5 MMHG
ECHO LVOT PEAK VELOCITY: 1.1 M/S
ECHO LVOT STROKE VOLUME INDEX: 37.7 ML/M2
ECHO LVOT SV: 65.6 ML
ECHO LVOT VTI: 20.9 CM
ECHO MV "A" WAVE DURATION: 179.9 MSEC
ECHO MV A VELOCITY: 1 M/S
ECHO MV AREA PHT: 3.4 CM2
ECHO MV AREA VTI: 3.5 CM2
ECHO MV E DECELERATION TIME (DT): 165.2 MS
ECHO MV E VELOCITY: 0.77 M/S
ECHO MV E/A RATIO: 0.77
ECHO MV E/E' LATERAL: 1100
ECHO MV E/E' RATIO (AVERAGED): 1320
ECHO MV E/E' SEPTAL: 1540
ECHO MV LVOT VTI INDEX: 0.89
ECHO MV MAX VELOCITY: 1 M/S
ECHO MV MEAN GRADIENT: 1 MMHG
ECHO MV MEAN VELOCITY: 0.5 M/S
ECHO MV PEAK GRADIENT: 4 MMHG
ECHO MV PRESSURE HALF TIME (PHT): 64.4 MS
ECHO MV VTI: 18.6 CM
ECHO PV MAX VELOCITY: 0.7 M/S
ECHO PV MEAN GRADIENT: 1 MMHG
ECHO PV MEAN VELOCITY: 0.5 M/S
ECHO PV PEAK GRADIENT: 2 MMHG
ECHO PV VTI: 13.6 CM
ECHO PVEIN A DURATION: 161.5 MS
ECHO PVEIN A VELOCITY: 0.5 M/S
ECHO PVEIN PEAK D VELOCITY: 0.4 M/S
ECHO PVEIN PEAK S VELOCITY: 0.6 M/S
ECHO PVEIN S/D RATIO: 1.5
ECHO RIGHT VENTRICULAR SYSTOLIC PRESSURE (RVSP): 25 MMHG
ECHO RV BASAL DIMENSION: 3.2 CM
ECHO RV INTERNAL DIMENSION: 3.1 CM
ECHO RV LONGITUDINAL DIMENSION: 6.3 CM
ECHO RV MID DIMENSION: 2.6 CM
ECHO RV TAPSE: 1.9 CM (ref 1.7–?)
ECHO TV REGURGITANT MAX VELOCITY: 2.36 M/S
ECHO TV REGURGITANT PEAK GRADIENT: 22 MMHG

## 2025-01-20 PROCEDURE — 93306 TTE W/DOPPLER COMPLETE: CPT | Performed by: INTERNAL MEDICINE

## 2025-01-20 PROCEDURE — 93306 TTE W/DOPPLER COMPLETE: CPT

## 2025-03-06 ENCOUNTER — OFFICE VISIT (OUTPATIENT)
Dept: VASCULAR SURGERY | Age: 75
End: 2025-03-06
Payer: MEDICARE

## 2025-03-06 VITALS — SYSTOLIC BLOOD PRESSURE: 126 MMHG | DIASTOLIC BLOOD PRESSURE: 83 MMHG

## 2025-03-06 DIAGNOSIS — I82.412 ACUTE DEEP VEIN THROMBOSIS (DVT) OF FEMORAL VEIN OF LEFT LOWER EXTREMITY (HCC): Primary | ICD-10-CM

## 2025-03-06 DIAGNOSIS — M79.89 LEFT LEG SWELLING: ICD-10-CM

## 2025-03-06 PROBLEM — I47.20 V-TACH (HCC): Status: RESOLVED | Noted: 2022-05-03 | Resolved: 2025-03-06

## 2025-03-06 PROBLEM — I47.20 VT (VENTRICULAR TACHYCARDIA) (HCC): Status: RESOLVED | Noted: 2022-05-24 | Resolved: 2025-03-06

## 2025-03-06 PROBLEM — E16.2 HYPOGLYCEMIA: Status: RESOLVED | Noted: 2022-06-15 | Resolved: 2025-03-06

## 2025-03-06 PROBLEM — I31.39 PERICARDIAL EFFUSION: Status: RESOLVED | Noted: 2022-05-25 | Resolved: 2025-03-06

## 2025-03-06 PROCEDURE — 1160F RVW MEDS BY RX/DR IN RCRD: CPT | Performed by: SURGERY

## 2025-03-06 PROCEDURE — 1090F PRES/ABSN URINE INCON ASSESS: CPT | Performed by: SURGERY

## 2025-03-06 PROCEDURE — G8427 DOCREV CUR MEDS BY ELIG CLIN: HCPCS | Performed by: SURGERY

## 2025-03-06 PROCEDURE — 99204 OFFICE O/P NEW MOD 45 MIN: CPT | Performed by: SURGERY

## 2025-03-06 PROCEDURE — 1123F ACP DISCUSS/DSCN MKR DOCD: CPT | Performed by: SURGERY

## 2025-03-06 PROCEDURE — 1159F MED LIST DOCD IN RCRD: CPT | Performed by: SURGERY

## 2025-03-06 PROCEDURE — 3017F COLORECTAL CA SCREEN DOC REV: CPT | Performed by: SURGERY

## 2025-03-06 PROCEDURE — G8399 PT W/DXA RESULTS DOCUMENT: HCPCS | Performed by: SURGERY

## 2025-03-06 PROCEDURE — 1036F TOBACCO NON-USER: CPT | Performed by: SURGERY

## 2025-03-06 PROCEDURE — G8420 CALC BMI NORM PARAMETERS: HCPCS | Performed by: SURGERY

## 2025-03-06 NOTE — PROGRESS NOTES
Chief Complaint:   Chief Complaint   Patient presents with    Surgical Consult     DVT, Swelling, hot to touch, no numbness/tingling. Pressure in groin when standing.          HPI: Patient, who is a retired nurse, came with her , who is also a nurse, for evaluation of acute deep vein thrombosis of the left lower extremity that happened in February, patient had a bad knee joint, had leg knee joint pain, significant swelling of the left leg, went and saw a local orthopedic surgeon, eventually will need total joint replacement surgery but because of swelling patient was recommended venous ultrasound that revealed evidence of extensive acute deep vein thrombosis involving left femoral vein oscillate to the popliteal vein, was started on anticoagulation and was referred by her PCP for further evaluation      Patient had a long drive to [place] where they spent 2 months every year    Patient had no previous history of deep vein thrombosis but did have multiple episodes of thrombophlebitis at 1 time underwent ligation and stripping of the left saphenous vein many years ago    Denies any chest pain or shortness of breath    Currently wearing ROSINA hose stockings for leg swelling    Patient is up-to-date with Pap smear, mammogram, colonoscopy etc.      Patient denies any focal lateralizing neurological symptoms like loss of speech, vision or loss of function of extremity    Patient can walk short distance slowly partly limited because of arthritis of knee joint and left leg swelling, and denies any symptoms of rest pain    Patient is multiple medical problem including coronary artery disease with nonsustained ventricular tachycardia, hypertension, hyperlipidemia, diabetes mellitus, hypothyroidism    Allergies   Allergen Reactions    Tresiba [Insulin Degludec] Other (See Comments)     Hives, headache    Sulfa Antibiotics Hives    Tetracyclines & Related Hives       Current Outpatient Medications   Medication Sig Dispense

## 2025-03-24 DIAGNOSIS — I49.3 PVC (PREMATURE VENTRICULAR CONTRACTION): Primary | ICD-10-CM

## 2025-03-24 DIAGNOSIS — I47.29 NSVT (NONSUSTAINED VENTRICULAR TACHYCARDIA) (HCC): ICD-10-CM

## 2025-03-24 RX ORDER — METOPROLOL SUCCINATE 50 MG/1
50 TABLET, EXTENDED RELEASE ORAL 2 TIMES DAILY
Qty: 180 TABLET | Refills: 3 | Status: SHIPPED | OUTPATIENT
Start: 2025-03-24

## 2025-03-28 NOTE — PROGRESS NOTES
[facility] CARDIOLOGY  CARDIAC ELECTROPHYSIOLOGY DEPARTMENT/DIVISION OF CARDIOLOGy  Outpatient Progress Note.   PATIENT: Digna Hunt  MEDICAL RECORD NUMBER: [id number]  DATE OF SERVICE:  3/31/2025   ATTENDING ELECTROPHYSIOLOGIST: Yolanda Guo MD  PRIMARY ELECTROPHYSIOLOGIST:  Cornel Crisostomo DO   REFERRING PHYSICIAN:   Jaron Leiva MD  CHIEF COMPLAINT: NSVT    HPI: Digna Hunt is a 74 y.o. female with a history of anterior RVOT PVC/idiopathic VT sp aborted RFA 2/2 pericardial effusion (5/24/22), polymyositis, DM, hypothyroidism, celiac disease, carpal tunnel sp b/l release (2023), and osteoporosis.  She is managed by Dr. Bhardwaj with at apixaban 5 mg twice daily, flecainide 100 mg twice daily, metoprolol XL 50 mg daily, and rosuvastatin 10 mg daily.  In approximately 1980, patient reports she was diagnosed with PVCs, which were treated with acebutolol, which resolved symptoms of palpitations.  In 2017, she was reportedly diagnosed with polymyositis based on elevated AST and CK, which was treated with azathioprine and prednisone. In 2021, she establish cardiology care with Dr. Bhardwaj.  She complained of exertional chest pain at that time, so exercise nuclear stress test was performed. Stress test reported no ischemia and normal LVEF, but did note frequent PVCs during exertion (on review of telemetry strips, site of origin is suspected to be RCC).  No changes in management were made at that time.  In 5/2022, she was diagnosed with idiopathic VT based on cardiac MRI and telemetry.  On 5/24/2022, PVC mapped to anterior RVOT (43 msec pre-QRS, QS unipolar signal), but procedure aborted prior to ablation due to new pericardial effusion observed following mapping.  Serial TTE reported stable small circumferential pericardial effusion without tamponade. She was discharged on flecainide/metoprolol with a rhythm monitor, which reported VE burden of 5.2% and episodes of NSVT (longest: 3.2

## 2025-03-31 ENCOUNTER — OFFICE VISIT (OUTPATIENT)
Dept: NON INVASIVE DIAGNOSTICS | Age: 75
End: 2025-03-31
Payer: MEDICARE

## 2025-03-31 VITALS
HEIGHT: 67 IN | SYSTOLIC BLOOD PRESSURE: 130 MMHG | OXYGEN SATURATION: 94 % | WEIGHT: 143 LBS | HEART RATE: 68 BPM | TEMPERATURE: 97.5 F | RESPIRATION RATE: 16 BRPM | BODY MASS INDEX: 22.44 KG/M2 | DIASTOLIC BLOOD PRESSURE: 70 MMHG

## 2025-03-31 DIAGNOSIS — I47.29 NSVT (NONSUSTAINED VENTRICULAR TACHYCARDIA) (HCC): ICD-10-CM

## 2025-03-31 DIAGNOSIS — I51.9 HEART PROBLEM: Primary | ICD-10-CM

## 2025-03-31 PROCEDURE — G8420 CALC BMI NORM PARAMETERS: HCPCS | Performed by: STUDENT IN AN ORGANIZED HEALTH CARE EDUCATION/TRAINING PROGRAM

## 2025-03-31 PROCEDURE — 1160F RVW MEDS BY RX/DR IN RCRD: CPT | Performed by: STUDENT IN AN ORGANIZED HEALTH CARE EDUCATION/TRAINING PROGRAM

## 2025-03-31 PROCEDURE — G8427 DOCREV CUR MEDS BY ELIG CLIN: HCPCS | Performed by: STUDENT IN AN ORGANIZED HEALTH CARE EDUCATION/TRAINING PROGRAM

## 2025-03-31 PROCEDURE — 1123F ACP DISCUSS/DSCN MKR DOCD: CPT | Performed by: STUDENT IN AN ORGANIZED HEALTH CARE EDUCATION/TRAINING PROGRAM

## 2025-03-31 PROCEDURE — 1036F TOBACCO NON-USER: CPT | Performed by: STUDENT IN AN ORGANIZED HEALTH CARE EDUCATION/TRAINING PROGRAM

## 2025-03-31 PROCEDURE — 3017F COLORECTAL CA SCREEN DOC REV: CPT | Performed by: STUDENT IN AN ORGANIZED HEALTH CARE EDUCATION/TRAINING PROGRAM

## 2025-03-31 PROCEDURE — 93000 ELECTROCARDIOGRAM COMPLETE: CPT | Performed by: STUDENT IN AN ORGANIZED HEALTH CARE EDUCATION/TRAINING PROGRAM

## 2025-03-31 PROCEDURE — G8399 PT W/DXA RESULTS DOCUMENT: HCPCS | Performed by: STUDENT IN AN ORGANIZED HEALTH CARE EDUCATION/TRAINING PROGRAM

## 2025-03-31 PROCEDURE — 1090F PRES/ABSN URINE INCON ASSESS: CPT | Performed by: STUDENT IN AN ORGANIZED HEALTH CARE EDUCATION/TRAINING PROGRAM

## 2025-03-31 PROCEDURE — 99214 OFFICE O/P EST MOD 30 MIN: CPT | Performed by: STUDENT IN AN ORGANIZED HEALTH CARE EDUCATION/TRAINING PROGRAM

## 2025-03-31 PROCEDURE — 1159F MED LIST DOCD IN RCRD: CPT | Performed by: STUDENT IN AN ORGANIZED HEALTH CARE EDUCATION/TRAINING PROGRAM

## 2025-03-31 RX ORDER — FLECAINIDE ACETATE 50 MG/1
50 TABLET ORAL 2 TIMES DAILY
Qty: 180 TABLET | Refills: 2 | Status: SHIPPED | OUTPATIENT
Start: 2025-03-31 | End: 2025-06-29

## 2025-04-08 ENCOUNTER — HOSPITAL ENCOUNTER (OUTPATIENT)
Dept: MAMMOGRAPHY | Age: 75
Discharge: HOME | End: 2025-04-10
Payer: MEDICARE

## 2025-04-08 DIAGNOSIS — M85.89 OSTEOPENIA OF MULTIPLE SITES: ICD-10-CM

## 2025-04-08 PROCEDURE — 77080 DXA BONE DENSITY AXIAL: CPT

## 2025-04-09 ENCOUNTER — RESULTS FOLLOW-UP (OUTPATIENT)
Dept: RHEUMATOLOGY | Age: 75
End: 2025-04-09

## 2025-04-09 DIAGNOSIS — M81.0 AGE-RELATED OSTEOPOROSIS WITHOUT CURRENT PATHOLOGICAL FRACTURE: Primary | ICD-10-CM

## 2025-04-09 RX ORDER — ONDANSETRON 2 MG/ML
8 INJECTION INTRAMUSCULAR; INTRAVENOUS
OUTPATIENT
Start: 2025-04-09

## 2025-04-09 RX ORDER — ACETAMINOPHEN 325 MG/1
650 TABLET ORAL
OUTPATIENT
Start: 2025-04-09

## 2025-04-09 RX ORDER — FAMOTIDINE 10 MG/ML
20 INJECTION, SOLUTION INTRAVENOUS
OUTPATIENT
Start: 2025-04-09

## 2025-04-09 RX ORDER — SODIUM CHLORIDE 9 MG/ML
INJECTION, SOLUTION INTRAVENOUS CONTINUOUS
OUTPATIENT
Start: 2025-04-09

## 2025-04-09 RX ORDER — DIPHENHYDRAMINE HYDROCHLORIDE 50 MG/ML
50 INJECTION, SOLUTION INTRAMUSCULAR; INTRAVENOUS
OUTPATIENT
Start: 2025-04-09

## 2025-04-09 RX ORDER — EPINEPHRINE 1 MG/ML
0.3 INJECTION, SOLUTION, CONCENTRATE INTRAVENOUS PRN
OUTPATIENT
Start: 2025-04-09

## 2025-04-09 RX ORDER — HYDROCORTISONE SODIUM SUCCINATE 100 MG/2ML
100 INJECTION INTRAMUSCULAR; INTRAVENOUS
OUTPATIENT
Start: 2025-04-09

## 2025-04-09 RX ORDER — ALBUTEROL SULFATE 90 UG/1
4 INHALANT RESPIRATORY (INHALATION) PRN
OUTPATIENT
Start: 2025-04-09

## 2025-05-19 ENCOUNTER — TELEPHONE (OUTPATIENT)
Dept: INFUSION THERAPY | Age: 75
End: 2025-05-19

## 2025-06-13 ENCOUNTER — TELEPHONE (OUTPATIENT)
Dept: VASCULAR SURGERY | Age: 75
End: 2025-06-13

## 2025-06-13 NOTE — TELEPHONE ENCOUNTER
Notified patient that she does not need to wear compression stockings as long as she has no leg swelling.

## 2025-07-07 ENCOUNTER — OFFICE VISIT (OUTPATIENT)
Dept: RHEUMATOLOGY | Age: 75
End: 2025-07-07
Payer: MEDICARE

## 2025-07-07 VITALS
SYSTOLIC BLOOD PRESSURE: 137 MMHG | HEART RATE: 80 BPM | WEIGHT: 143 LBS | HEIGHT: 68 IN | BODY MASS INDEX: 21.67 KG/M2 | DIASTOLIC BLOOD PRESSURE: 69 MMHG

## 2025-07-07 DIAGNOSIS — E11.9 TYPE II DIABETES MELLITUS, WELL CONTROLLED (HCC): ICD-10-CM

## 2025-07-07 DIAGNOSIS — M35.9 POLYMYOSITIS ASSOCIATED WITH AUTOIMMUNE DISEASE (HCC): Primary | ICD-10-CM

## 2025-07-07 DIAGNOSIS — I47.29 NSVT (NONSUSTAINED VENTRICULAR TACHYCARDIA) (HCC): ICD-10-CM

## 2025-07-07 DIAGNOSIS — M85.89 OSTEOPENIA OF MULTIPLE SITES: ICD-10-CM

## 2025-07-07 DIAGNOSIS — M15.9 GENERALIZED OSTEOARTHRITIS: ICD-10-CM

## 2025-07-07 DIAGNOSIS — M35.00 SJOGREN'S SYNDROME WITHOUT EXTRAGLANDULAR INVOLVEMENT: ICD-10-CM

## 2025-07-07 DIAGNOSIS — M33.20 POLYMYOSITIS ASSOCIATED WITH AUTOIMMUNE DISEASE (HCC): Primary | ICD-10-CM

## 2025-07-07 PROBLEM — I82.512 CHRONIC DEEP VEIN THROMBOSIS (DVT) OF FEMORAL VEIN OF LEFT LOWER EXTREMITY (HCC): Status: ACTIVE | Noted: 2025-03-06

## 2025-07-07 PROCEDURE — 3017F COLORECTAL CA SCREEN DOC REV: CPT | Performed by: INTERNAL MEDICINE

## 2025-07-07 PROCEDURE — G8428 CUR MEDS NOT DOCUMENT: HCPCS | Performed by: INTERNAL MEDICINE

## 2025-07-07 PROCEDURE — 1090F PRES/ABSN URINE INCON ASSESS: CPT | Performed by: INTERNAL MEDICINE

## 2025-07-07 PROCEDURE — 2022F DILAT RTA XM EVC RTNOPTHY: CPT | Performed by: INTERNAL MEDICINE

## 2025-07-07 PROCEDURE — 1036F TOBACCO NON-USER: CPT | Performed by: INTERNAL MEDICINE

## 2025-07-07 PROCEDURE — G2211 COMPLEX E/M VISIT ADD ON: HCPCS | Performed by: INTERNAL MEDICINE

## 2025-07-07 PROCEDURE — G8399 PT W/DXA RESULTS DOCUMENT: HCPCS | Performed by: INTERNAL MEDICINE

## 2025-07-07 PROCEDURE — 3046F HEMOGLOBIN A1C LEVEL >9.0%: CPT | Performed by: INTERNAL MEDICINE

## 2025-07-07 PROCEDURE — 99214 OFFICE O/P EST MOD 30 MIN: CPT | Performed by: INTERNAL MEDICINE

## 2025-07-07 PROCEDURE — G8420 CALC BMI NORM PARAMETERS: HCPCS | Performed by: INTERNAL MEDICINE

## 2025-07-07 PROCEDURE — 1123F ACP DISCUSS/DSCN MKR DOCD: CPT | Performed by: INTERNAL MEDICINE

## 2025-07-07 ASSESSMENT — ENCOUNTER SYMPTOMS
VOMITING: 0
DIARRHEA: 0
ABDOMINAL PAIN: 0
SHORTNESS OF BREATH: 0
COLOR CHANGE: 0
COUGH: 0
TROUBLE SWALLOWING: 0
NAUSEA: 0

## 2025-07-08 ENCOUNTER — HOSPITAL ENCOUNTER (OUTPATIENT)
Age: 75
Discharge: HOME | End: 2025-07-08
Payer: MEDICARE

## 2025-07-08 DIAGNOSIS — M35.00 SJOGREN'S SYNDROME WITHOUT EXTRAGLANDULAR INVOLVEMENT: ICD-10-CM

## 2025-07-08 DIAGNOSIS — M85.89 OSTEOPENIA OF MULTIPLE SITES: ICD-10-CM

## 2025-07-08 DIAGNOSIS — M33.20 POLYMYOSITIS ASSOCIATED WITH AUTOIMMUNE DISEASE (HCC): ICD-10-CM

## 2025-07-08 DIAGNOSIS — M35.9 POLYMYOSITIS ASSOCIATED WITH AUTOIMMUNE DISEASE (HCC): ICD-10-CM

## 2025-07-08 LAB
BACTERIA URNS QL MICRO: ABNORMAL
BASOPHILS # BLD: 0.03 K/UL (ref 0–0.2)
BASOPHILS NFR BLD: 1 % (ref 0–2)
BILIRUB UR QL STRIP: NEGATIVE
CHOLEST SERPL-MCNC: 167 MG/DL
CK SERPL-CCNC: 587 U/L (ref 0–170)
CLARITY UR: CLEAR
COLOR UR: YELLOW
CREAT UR-MCNC: 85.2 MG/DL (ref 29–226)
CRP SERPL HS-MCNC: <3 MG/L (ref 0–5)
EOSINOPHIL # BLD: 0.1 K/UL (ref 0.05–0.5)
EOSINOPHILS RELATIVE PERCENT: 3 % (ref 0–6)
EPI CELLS #/AREA URNS HPF: ABNORMAL /HPF
ERYTHROCYTE [DISTWIDTH] IN BLOOD BY AUTOMATED COUNT: 12.6 % (ref 11.5–15)
ERYTHROCYTE [SEDIMENTATION RATE] IN BLOOD BY WESTERGREN METHOD: 7 MM/HR (ref 0–20)
GLUCOSE UR STRIP-MCNC: NEGATIVE MG/DL
HCT VFR BLD AUTO: 42 % (ref 34–48)
HDLC SERPL-MCNC: 60 MG/DL
HGB BLD-MCNC: 13.6 G/DL (ref 11.5–15.5)
HGB UR QL STRIP.AUTO: ABNORMAL
IMM GRANULOCYTES # BLD AUTO: <0.03 K/UL (ref 0–0.58)
IMM GRANULOCYTES NFR BLD: 0 % (ref 0–5)
KETONES UR STRIP-MCNC: NEGATIVE MG/DL
LDLC SERPL CALC-MCNC: 85 MG/DL
LEUKOCYTE ESTERASE UR QL STRIP: NEGATIVE
LYMPHOCYTES NFR BLD: 0.97 K/UL (ref 1.5–4)
LYMPHOCYTES RELATIVE PERCENT: 25 % (ref 20–42)
MCH RBC QN AUTO: 31.8 PG (ref 26–35)
MCHC RBC AUTO-ENTMCNC: 32.4 G/DL (ref 32–34.5)
MCV RBC AUTO: 98.1 FL (ref 80–99.9)
MICROALBUMIN UR-MCNC: <12 MG/L (ref 0–20)
MICROALBUMIN/CREAT UR-RTO: <14 MCG/MG CREAT (ref 0–30)
MONOCYTES NFR BLD: 0.5 K/UL (ref 0.1–0.95)
MONOCYTES NFR BLD: 13 % (ref 2–12)
NEUTROPHILS NFR BLD: 58 % (ref 43–80)
NEUTS SEG NFR BLD: 2.21 K/UL (ref 1.8–7.3)
NITRITE UR QL STRIP: NEGATIVE
PH UR STRIP: 5.5 [PH] (ref 5–8)
PLATELET # BLD AUTO: 314 K/UL (ref 130–450)
PMV BLD AUTO: 9.7 FL (ref 7–12)
PROT UR STRIP-MCNC: NEGATIVE MG/DL
RBC # BLD AUTO: 4.28 M/UL (ref 3.5–5.5)
RBC #/AREA URNS HPF: ABNORMAL /HPF
SP GR UR STRIP: 1.02 (ref 1–1.03)
T4 FREE SERPL-MCNC: 1.5 NG/DL (ref 0.9–1.7)
TRIGL SERPL-MCNC: 109 MG/DL
TSH SERPL DL<=0.05 MIU/L-ACNC: 0.61 UIU/ML (ref 0.27–4.2)
UROBILINOGEN UR STRIP-ACNC: 0.2 EU/DL (ref 0–1)
VLDLC SERPL CALC-MCNC: 22 MG/DL
WBC #/AREA URNS HPF: ABNORMAL /HPF
WBC OTHER # BLD: 3.8 K/UL (ref 4.5–11.5)

## 2025-07-08 PROCEDURE — 82550 ASSAY OF CK (CPK): CPT

## 2025-07-08 PROCEDURE — 84165 PROTEIN E-PHORESIS SERUM: CPT

## 2025-07-08 PROCEDURE — 82043 UR ALBUMIN QUANTITATIVE: CPT

## 2025-07-08 PROCEDURE — 85652 RBC SED RATE AUTOMATED: CPT

## 2025-07-08 PROCEDURE — 82570 ASSAY OF URINE CREATININE: CPT

## 2025-07-08 PROCEDURE — 84439 ASSAY OF FREE THYROXINE: CPT

## 2025-07-08 PROCEDURE — 85025 COMPLETE CBC W/AUTO DIFF WBC: CPT

## 2025-07-08 PROCEDURE — 82085 ASSAY OF ALDOLASE: CPT

## 2025-07-08 PROCEDURE — 86140 C-REACTIVE PROTEIN: CPT

## 2025-07-08 PROCEDURE — 84443 ASSAY THYROID STIM HORMONE: CPT

## 2025-07-08 PROCEDURE — 81001 URINALYSIS AUTO W/SCOPE: CPT

## 2025-07-08 PROCEDURE — 80061 LIPID PANEL: CPT

## 2025-07-08 PROCEDURE — 36415 COLL VENOUS BLD VENIPUNCTURE: CPT

## 2025-07-08 PROCEDURE — 84155 ASSAY OF PROTEIN SERUM: CPT

## 2025-07-09 LAB
ALBUMIN SERPL-MCNC: 3.6 G/DL (ref 3.5–4.7)
ALPHA1 GLOB SERPL ELPH-MCNC: 0.3 G/DL (ref 0.2–0.4)
ALPHA2 GLOB SERPL ELPH-MCNC: 0.8 G/DL (ref 0.5–1)
B-GLOBULIN SERPL ELPH-MCNC: 1.1 G/DL (ref 0.8–1.3)
GAMMA GLOB SERPL ELPH-MCNC: 1.1 G/DL (ref 0.7–1.6)
PATHOLOGIST: NORMAL
PROT PATTERN SERPL ELPH-IMP: NORMAL
PROT SERPL-MCNC: 6.9 G/DL (ref 6.4–8.3)

## 2025-07-10 LAB — ALDOLASE SERPL-CCNC: 4.4 U/L (ref 1.2–7.6)

## (undated) DEVICE — 4-PORT MANIFOLD: Brand: NEPTUNE 2

## (undated) DEVICE — SOLUTION IRRIG 1000ML 0.9% SOD CHL USP POUR PLAS BTL

## (undated) DEVICE — ELECTRODE PT RET AD L9FT HI MOIST COND ADH HYDRGEL CORDED

## (undated) DEVICE — BANDAGE,GAUZE,BULKEE II,4.5"X4.1YD,STRL: Brand: MEDLINE

## (undated) DEVICE — DOUBLE BASIN SET: Brand: MEDLINE INDUSTRIES, INC.

## (undated) DEVICE — PENCIL ES CRD L10FT HND SWCHING ROCK SWCH W/ EDGE COAT BLDE

## (undated) DEVICE — STANDARD (U) BLADE ASSEMBLY 6PK: Brand: MICROAIRE®

## (undated) DEVICE — SURGICAL PROCEDURE PACK HND

## (undated) DEVICE — SKIN PREP TRAY 4 COMPARTM TRAY: Brand: MEDLINE INDUSTRIES, INC.

## (undated) DEVICE — GLOVE ORANGE PI 7   MSG9070

## (undated) DEVICE — COVER HNDL LT DISP

## (undated) DEVICE — 3M™ COBAN™ NL STERILE NON-LATEX SELF-ADHERENT WRAP, 2084S, 4 IN X 5 YD (10 CM X 4,5 M), 18 ROLLS/CASE: Brand: 3M™ COBAN™

## (undated) DEVICE — ZIMMER® STERILE DISPOSABLE TOURNIQUET CUFF WITH PLC, DUAL PORT, SINGLE BLADDER, 18 IN. (46 CM)

## (undated) DEVICE — KIT,ANTI FOG,W/SPONGE & FLUID,SOFT PACK: Brand: MEDLINE